# Patient Record
Sex: FEMALE | Race: WHITE | Employment: PART TIME | URBAN - METROPOLITAN AREA
[De-identification: names, ages, dates, MRNs, and addresses within clinical notes are randomized per-mention and may not be internally consistent; named-entity substitution may affect disease eponyms.]

---

## 2017-02-27 ENCOUNTER — APPOINTMENT (OUTPATIENT)
Dept: LAB | Age: 57
End: 2017-02-27
Attending: FAMILY MEDICINE
Payer: COMMERCIAL

## 2017-02-27 DIAGNOSIS — E78.5 OTHER AND UNSPECIFIED HYPERLIPIDEMIA: ICD-10-CM

## 2017-02-27 LAB
ALBUMIN SERPL-MCNC: 3.7 G/DL (ref 3.5–4.8)
ALP LIVER SERPL-CCNC: 89 U/L (ref 46–118)
ALT SERPL-CCNC: 31 U/L (ref 14–54)
AST SERPL-CCNC: 16 U/L (ref 15–41)
BILIRUB SERPL-MCNC: 0.2 MG/DL (ref 0.1–2)
BUN BLD-MCNC: 17 MG/DL (ref 8–20)
CALCIUM BLD-MCNC: 9.6 MG/DL (ref 8.3–10.3)
CHLORIDE: 106 MMOL/L (ref 101–111)
CHOLEST SMN-MCNC: 176 MG/DL (ref ?–200)
CO2: 30 MMOL/L (ref 22–32)
CREAT BLD-MCNC: 1.04 MG/DL (ref 0.55–1.02)
GLUCOSE BLD-MCNC: 83 MG/DL (ref 70–99)
HDLC SERPL-MCNC: 52 MG/DL (ref 45–?)
HDLC SERPL: 3.38 {RATIO} (ref ?–4.44)
LDLC SERPL CALC-MCNC: 80 MG/DL (ref ?–130)
M PROTEIN MFR SERPL ELPH: 7.5 G/DL (ref 6.1–8.3)
NONHDLC SERPL-MCNC: 124 MG/DL (ref ?–130)
POTASSIUM SERPL-SCNC: 4.2 MMOL/L (ref 3.6–5.1)
SODIUM SERPL-SCNC: 144 MMOL/L (ref 136–144)
TRIGLYCERIDES: 220 MG/DL (ref ?–150)
VLDL: 44 MG/DL (ref 5–40)

## 2017-02-27 PROCEDURE — 80061 LIPID PANEL: CPT

## 2017-02-27 PROCEDURE — 80053 COMPREHEN METABOLIC PANEL: CPT

## 2017-02-27 PROCEDURE — 36415 COLL VENOUS BLD VENIPUNCTURE: CPT

## 2017-03-02 RX ORDER — LEVOTHYROXINE SODIUM 0.1 MG/1
TABLET ORAL
Qty: 30 TABLET | Refills: 3 | Status: SHIPPED | OUTPATIENT
Start: 2017-03-02 | End: 2017-03-07

## 2017-03-07 ENCOUNTER — OFFICE VISIT (OUTPATIENT)
Dept: FAMILY MEDICINE CLINIC | Facility: CLINIC | Age: 57
End: 2017-03-07

## 2017-03-07 VITALS
RESPIRATION RATE: 16 BRPM | DIASTOLIC BLOOD PRESSURE: 64 MMHG | TEMPERATURE: 98 F | HEART RATE: 68 BPM | SYSTOLIC BLOOD PRESSURE: 112 MMHG | WEIGHT: 156 LBS | BODY MASS INDEX: 28 KG/M2

## 2017-03-07 DIAGNOSIS — K21.9 GASTROESOPHAGEAL REFLUX DISEASE, ESOPHAGITIS PRESENCE NOT SPECIFIED: ICD-10-CM

## 2017-03-07 DIAGNOSIS — F43.21 GRIEF REACTION: ICD-10-CM

## 2017-03-07 DIAGNOSIS — I10 BENIGN ESSENTIAL HYPERTENSION: Primary | ICD-10-CM

## 2017-03-07 DIAGNOSIS — E03.9 HYPOTHYROIDISM, UNSPECIFIED TYPE: ICD-10-CM

## 2017-03-07 DIAGNOSIS — E78.5 HYPERLIPIDEMIA, UNSPECIFIED HYPERLIPIDEMIA TYPE: ICD-10-CM

## 2017-03-07 PROCEDURE — 99214 OFFICE O/P EST MOD 30 MIN: CPT | Performed by: FAMILY MEDICINE

## 2017-03-07 RX ORDER — OMEPRAZOLE 40 MG/1
40 CAPSULE, DELAYED RELEASE ORAL
Qty: 30 CAPSULE | Refills: 5 | Status: SHIPPED | OUTPATIENT
Start: 2017-03-07 | End: 2018-01-12

## 2017-03-07 RX ORDER — FLUOXETINE HYDROCHLORIDE 20 MG/1
20 CAPSULE ORAL
Qty: 30 CAPSULE | Refills: 5 | Status: SHIPPED | OUTPATIENT
Start: 2017-03-07 | End: 2018-01-12

## 2017-03-07 RX ORDER — LOSARTAN POTASSIUM 50 MG/1
50 TABLET ORAL
Qty: 30 TABLET | Refills: 5 | Status: SHIPPED | OUTPATIENT
Start: 2017-03-07 | End: 2018-01-12

## 2017-03-07 RX ORDER — LEVOTHYROXINE SODIUM 0.1 MG/1
100 TABLET ORAL
Qty: 30 TABLET | Refills: 3 | Status: SHIPPED | OUTPATIENT
Start: 2017-03-07 | End: 2017-11-15

## 2017-03-07 RX ORDER — ATORVASTATIN CALCIUM 10 MG/1
10 TABLET, FILM COATED ORAL
Qty: 30 TABLET | Refills: 5 | Status: SHIPPED | OUTPATIENT
Start: 2017-03-07 | End: 2018-01-12

## 2017-03-07 NOTE — PROGRESS NOTES
CHIEF COMPLAINT:   Jefferson Sherwood a 61year old female is here for followup on HTN, cholesterol, grief reaction, hypothyroid, GERD  HPI:   Jefferson Shrewood has been doing well since the last visit here.   Jefferson Sherwood  is compliant with hypertensive m were placed in this encounter. Meds & Refills for this Visit:  Signed Prescriptions Disp Refills    Levothyroxine Sodium 100 MCG Oral Tab 30 tablet 3      Sig: Take 1 tablet (100 mcg total) by mouth once daily.       Omeprazole 40 MG Oral Capsule Nenita

## 2017-03-08 PROBLEM — E78.5 HYPERLIPIDEMIA: Status: ACTIVE | Noted: 2017-03-08

## 2017-05-02 DIAGNOSIS — G43.909 MIGRAINE WITHOUT STATUS MIGRAINOSUS, NOT INTRACTABLE, UNSPECIFIED MIGRAINE TYPE: Primary | ICD-10-CM

## 2017-05-02 RX ORDER — BUTALBITAL, ACETAMINOPHEN AND CAFFEINE 50; 325; 40 MG/1; MG/1; MG/1
TABLET ORAL
Qty: 60 TABLET | Refills: 0 | Status: SHIPPED
Start: 2017-05-02 | End: 2018-01-30

## 2017-05-02 NOTE — TELEPHONE ENCOUNTER
Medication: Fioricet    Date of last refill: 7/26/2017  [place]    Last office visit: 7/26/2016  Due back to clinic per last office note:  RTC in one year  Date next office visit scheduled:  None s

## 2017-07-17 DIAGNOSIS — Z12.39 BREAST CANCER SCREENING: Primary | ICD-10-CM

## 2017-07-17 NOTE — TELEPHONE ENCOUNTER
Medication failed protocol due to pt needing a mammogram.  Last OV 3/7/17 last refill 8/18/16.   Please review and refill if appropriate

## 2017-08-17 ENCOUNTER — TELEPHONE (OUTPATIENT)
Dept: FAMILY MEDICINE CLINIC | Facility: CLINIC | Age: 57
End: 2017-08-17

## 2017-08-17 RX ORDER — ESTROGEN,CON/M-PROGEST ACET 0.45-1.5MG
1 TABLET ORAL DAILY
Qty: 28 TABLET | Refills: 1 | Status: SHIPPED | OUTPATIENT
Start: 2017-08-17 | End: 2017-10-16

## 2017-08-17 NOTE — TELEPHONE ENCOUNTER
Please call patient to remind her to do her mammogram.  She is on HRT so needs to make sure she has done annually.

## 2017-08-17 NOTE — TELEPHONE ENCOUNTER
Has an appt on 10/07/2017 with you. Pended refill for # 28 with 1 refill to get her to her appt with you.

## 2017-08-23 DIAGNOSIS — G43.909 MIGRAINE WITHOUT STATUS MIGRAINOSUS, NOT INTRACTABLE, UNSPECIFIED MIGRAINE TYPE: ICD-10-CM

## 2017-08-23 NOTE — TELEPHONE ENCOUNTER
Patient willing to schedule an appointment, in order to process refill request. Call transferred to [facility]. Appointment scheduled.      Medication: Amitriptyline     Date of last refill: 07/26/16  [place]    Last office

## 2017-08-24 ENCOUNTER — OFFICE VISIT (OUTPATIENT)
Dept: NEUROLOGY | Facility: CLINIC | Age: 57
End: 2017-08-24

## 2017-08-24 VITALS
HEIGHT: 63 IN | WEIGHT: 156 LBS | RESPIRATION RATE: 16 BRPM | DIASTOLIC BLOOD PRESSURE: 79 MMHG | BODY MASS INDEX: 27.64 KG/M2 | SYSTOLIC BLOOD PRESSURE: 122 MMHG | HEART RATE: 82 BPM

## 2017-08-24 DIAGNOSIS — M54.81 OCCIPITAL NEURALGIA OF LEFT SIDE: ICD-10-CM

## 2017-08-24 DIAGNOSIS — G43.009 MIGRAINE WITHOUT AURA AND WITHOUT STATUS MIGRAINOSUS, NOT INTRACTABLE: Primary | ICD-10-CM

## 2017-08-24 PROCEDURE — 99214 OFFICE O/P EST MOD 30 MIN: CPT | Performed by: PHYSICIAN ASSISTANT

## 2017-08-24 RX ORDER — AMITRIPTYLINE HYDROCHLORIDE 25 MG/1
TABLET, FILM COATED ORAL
Qty: 90 TABLET | Refills: 3 | Status: SHIPPED | OUTPATIENT
Start: 2017-08-24 | End: 2018-07-24

## 2017-08-24 NOTE — PROGRESS NOTES
HPI:    Patient ID: Rona Fernandez is a 61year old female. HPI     Patient is a 61year old female here for follow-up of headaches. Her headache are currently well controlled typically occurring once a month still.  She is on amitriptyline without sherly capsule Rfl: 5   Losartan Potassium 50 MG Oral Tab Take 1 tablet (50 mg total) by mouth once daily. Disp: 30 tablet Rfl: 5   Atorvastatin Calcium 10 MG Oral Tab Take 1 tablet (10 mg total) by mouth once daily.  Disp: 30 tablet Rfl: 5   FLUoxetine HCl 20 MG the right side and 2+ on the left side. Achilles reflexes are 2+ on the right side and 2+ on the left side. No drift on exam. FTN intact bilaterally. Tandem gait intact. Tenderness of the left occipital area. Skin: Skin is warm and dry.    Psychiat

## 2017-08-24 NOTE — PATIENT INSTRUCTIONS
Refill policies:    • Allow 2-3 business days for refills; controlled substances may take longer.   • Contact your pharmacy at least 5 days prior to running out of medication and have them send an electronic request or submit request through the [facility] have a procedure or additional testing performed. [facility]) will contact your insurance carrier to obtain pre-certification or prior authorization.     Unfortunately, [facility] has seen an increase in denial of payment even though the p

## 2017-08-24 NOTE — PROGRESS NOTES
HPI:    Patient ID: Yousif Amaya is a 61year old female. HPI    Review of Systems         Current Outpatient Prescriptions:  PREMPRO 0.45-1.5 MG Oral Tab TAKE 1 TABLET BY MOUTH DAILY.  Disp: 28 tablet Rfl: 1   Butalbital-APAP-Caffeine -40 MG O

## 2017-08-26 ENCOUNTER — HOSPITAL ENCOUNTER (OUTPATIENT)
Dept: MAMMOGRAPHY | Facility: HOSPITAL | Age: 57
Discharge: HOME | End: 2017-08-26
Attending: FAMILY MEDICINE
Payer: COMMERCIAL

## 2017-08-26 DIAGNOSIS — Z12.39 BREAST CANCER SCREENING: ICD-10-CM

## 2017-08-26 PROCEDURE — 77067 SCR MAMMO BI INCL CAD: CPT | Performed by: FAMILY MEDICINE

## 2017-09-08 ENCOUNTER — HOSPITAL ENCOUNTER (OUTPATIENT)
Dept: MAMMOGRAPHY | Facility: HOSPITAL | Age: 57
Discharge: HOME | End: 2017-09-08
Attending: FAMILY MEDICINE
Payer: COMMERCIAL

## 2017-09-08 DIAGNOSIS — R92.8 ABNORMAL MAMMOGRAM OF RIGHT BREAST: ICD-10-CM

## 2017-09-08 PROCEDURE — 77061 BREAST TOMOSYNTHESIS UNI: CPT | Performed by: FAMILY MEDICINE

## 2017-09-08 PROCEDURE — 77065 DX MAMMO INCL CAD UNI: CPT | Performed by: FAMILY MEDICINE

## 2017-09-08 PROCEDURE — 76642 ULTRASOUND BREAST LIMITED: CPT | Performed by: FAMILY MEDICINE

## 2017-10-05 ENCOUNTER — OFFICE VISIT (OUTPATIENT)
Dept: FAMILY MEDICINE CLINIC | Facility: CLINIC | Age: 57
End: 2017-10-05

## 2017-10-05 VITALS
HEIGHT: 62 IN | WEIGHT: 158 LBS | HEART RATE: 64 BPM | TEMPERATURE: 97 F | DIASTOLIC BLOOD PRESSURE: 84 MMHG | BODY MASS INDEX: 29.08 KG/M2 | SYSTOLIC BLOOD PRESSURE: 134 MMHG | RESPIRATION RATE: 12 BRPM

## 2017-10-05 DIAGNOSIS — Z11.51 SCREENING FOR HPV (HUMAN PAPILLOMAVIRUS): ICD-10-CM

## 2017-10-05 DIAGNOSIS — Z13.89 SCREENING FOR GENITOURINARY CONDITION: ICD-10-CM

## 2017-10-05 DIAGNOSIS — Z00.00 ROUTINE GENERAL MEDICAL EXAMINATION AT A HEALTH CARE FACILITY: Primary | ICD-10-CM

## 2017-10-05 DIAGNOSIS — E04.2 MULTINODULAR GOITER: ICD-10-CM

## 2017-10-05 DIAGNOSIS — M85.80 OSTEOPENIA, UNSPECIFIED LOCATION: ICD-10-CM

## 2017-10-05 DIAGNOSIS — D47.2 MONOCLONAL GAMMOPATHY: ICD-10-CM

## 2017-10-05 DIAGNOSIS — R20.2 PARESTHESIAS: ICD-10-CM

## 2017-10-05 DIAGNOSIS — Z12.4 PAP SMEAR FOR CERVICAL CANCER SCREENING: ICD-10-CM

## 2017-10-05 PROCEDURE — 99396 PREV VISIT EST AGE 40-64: CPT | Performed by: FAMILY MEDICINE

## 2017-10-05 PROCEDURE — 87624 HPV HI-RISK TYP POOLED RSLT: CPT | Performed by: FAMILY MEDICINE

## 2017-10-05 PROCEDURE — 90686 IIV4 VACC NO PRSV 0.5 ML IM: CPT | Performed by: FAMILY MEDICINE

## 2017-10-05 PROCEDURE — 88175 CYTOPATH C/V AUTO FLUID REDO: CPT | Performed by: FAMILY MEDICINE

## 2017-10-05 PROCEDURE — 90471 IMMUNIZATION ADMIN: CPT | Performed by: FAMILY MEDICINE

## 2017-10-05 NOTE — PROGRESS NOTES
CHIEF COMPLAINT       Annual Physical Exam  HPI:   Inocente Rodriguez is a 61year old female who presents for a complete physical exam.   Fingertips tingling on both hands - goes numb at times. No pain. Seems to be all fingers on both hands.   Hasn't been Capsule Delayed Release Take 1 capsule (40 mg total) by mouth once daily. Disp: 30 capsule Rfl: 5   Losartan Potassium 50 MG Oral Tab Take 1 tablet (50 mg total) by mouth once daily.  Disp: 30 tablet Rfl: 5   Atorvastatin Calcium 10 MG Oral Tab Take 1 table denies nasal congestion, sinus pain or ST  LUNGS: denies shortness of breath with exertion  CARDIOVASCULAR: denies chest pain on exertion  GI: denies abdominal pain,denies heartburn  : denies dysuria, vaginal discharge or itching  MUSCULOSKELETAL: denies

## 2017-10-11 ENCOUNTER — HOSPITAL ENCOUNTER (OUTPATIENT)
Dept: ULTRASOUND IMAGING | Age: 57
Discharge: HOME | End: 2017-10-11
Attending: FAMILY MEDICINE
Payer: COMMERCIAL

## 2017-10-11 ENCOUNTER — LABORATORY ENCOUNTER (OUTPATIENT)
Dept: LAB | Age: 57
End: 2017-10-11
Attending: FAMILY MEDICINE
Payer: COMMERCIAL

## 2017-10-11 ENCOUNTER — APPOINTMENT (OUTPATIENT)
Dept: HEMATOLOGY/ONCOLOGY | Facility: HOSPITAL | Age: 57
End: 2017-10-11
Attending: INTERNAL MEDICINE
Payer: COMMERCIAL

## 2017-10-11 ENCOUNTER — HOSPITAL ENCOUNTER (OUTPATIENT)
Dept: BONE DENSITY | Age: 57
Discharge: HOME | End: 2017-10-11
Attending: FAMILY MEDICINE
Payer: COMMERCIAL

## 2017-10-11 DIAGNOSIS — M85.80 OSTEOPENIA, UNSPECIFIED LOCATION: ICD-10-CM

## 2017-10-11 DIAGNOSIS — E04.2 MULTINODULAR GOITER: ICD-10-CM

## 2017-10-11 DIAGNOSIS — Z00.00 ROUTINE GENERAL MEDICAL EXAMINATION AT A HEALTH CARE FACILITY: ICD-10-CM

## 2017-10-11 DIAGNOSIS — R20.2 PARESTHESIAS: ICD-10-CM

## 2017-10-11 DIAGNOSIS — R73.9 ELEVATED BLOOD SUGAR: Primary | ICD-10-CM

## 2017-10-11 DIAGNOSIS — R73.9 ELEVATED BLOOD SUGAR: ICD-10-CM

## 2017-10-11 DIAGNOSIS — Z13.89 SCREENING FOR GENITOURINARY CONDITION: ICD-10-CM

## 2017-10-11 PROCEDURE — 77080 DXA BONE DENSITY AXIAL: CPT | Performed by: FAMILY MEDICINE

## 2017-10-11 PROCEDURE — 83036 HEMOGLOBIN GLYCOSYLATED A1C: CPT | Performed by: FAMILY MEDICINE

## 2017-10-11 PROCEDURE — 80061 LIPID PANEL: CPT | Performed by: FAMILY MEDICINE

## 2017-10-11 PROCEDURE — 76536 US EXAM OF HEAD AND NECK: CPT | Performed by: FAMILY MEDICINE

## 2017-10-11 PROCEDURE — 84439 ASSAY OF FREE THYROXINE: CPT | Performed by: FAMILY MEDICINE

## 2017-10-11 PROCEDURE — 82607 VITAMIN B-12: CPT | Performed by: FAMILY MEDICINE

## 2017-10-11 PROCEDURE — 81003 URINALYSIS AUTO W/O SCOPE: CPT | Performed by: FAMILY MEDICINE

## 2017-10-11 PROCEDURE — 82306 VITAMIN D 25 HYDROXY: CPT | Performed by: FAMILY MEDICINE

## 2017-10-11 PROCEDURE — 36415 COLL VENOUS BLD VENIPUNCTURE: CPT | Performed by: FAMILY MEDICINE

## 2017-10-11 PROCEDURE — 80050 GENERAL HEALTH PANEL: CPT | Performed by: FAMILY MEDICINE

## 2017-10-12 DIAGNOSIS — E78.5 HYPERLIPIDEMIA, UNSPECIFIED HYPERLIPIDEMIA TYPE: ICD-10-CM

## 2017-10-12 DIAGNOSIS — R73.02 GLUCOSE INTOLERANCE (IMPAIRED GLUCOSE TOLERANCE): Primary | ICD-10-CM

## 2017-10-16 RX ORDER — ESTROGEN,CON/M-PROGEST ACET 0.45-1.5MG
1 TABLET ORAL DAILY
Qty: 28 TABLET | Refills: 5 | Status: SHIPPED | OUTPATIENT
Start: 2017-10-16 | End: 2018-03-20

## 2017-10-18 ENCOUNTER — APPOINTMENT (OUTPATIENT)
Dept: HEMATOLOGY/ONCOLOGY | Facility: HOSPITAL | Age: 57
End: 2017-10-18
Payer: COMMERCIAL

## 2017-10-25 ENCOUNTER — TELEPHONE (OUTPATIENT)
Dept: FAMILY MEDICINE CLINIC | Facility: CLINIC | Age: 57
End: 2017-10-25

## 2017-10-25 NOTE — TELEPHONE ENCOUNTER
See note below, per your note 10/11/17 VitB complex was recommended. Pt wants to know if it has a mg dose, will any Vit B complex do? Please advise.

## 2017-10-25 NOTE — TELEPHONE ENCOUNTER
States Prabhu Rodriguez recommended that she take Vitamin B. She is not sure if its Vitamin B complex  or B12 and how much? Please advise at [phone]. Thank you.

## 2017-11-15 RX ORDER — LEVOTHYROXINE SODIUM 0.1 MG/1
100 TABLET ORAL
Qty: 30 TABLET | Refills: 2 | Status: SHIPPED | OUTPATIENT
Start: 2017-11-15 | End: 2018-01-28

## 2018-01-11 ENCOUNTER — APPOINTMENT (OUTPATIENT)
Dept: LAB | Age: 58
End: 2018-01-11
Attending: FAMILY MEDICINE
Payer: COMMERCIAL

## 2018-01-11 DIAGNOSIS — R73.02 GLUCOSE INTOLERANCE (IMPAIRED GLUCOSE TOLERANCE): ICD-10-CM

## 2018-01-11 DIAGNOSIS — E78.5 HYPERLIPIDEMIA, UNSPECIFIED HYPERLIPIDEMIA TYPE: ICD-10-CM

## 2018-01-11 LAB
ALBUMIN SERPL-MCNC: 3.7 G/DL (ref 3.5–4.8)
ALP LIVER SERPL-CCNC: 68 U/L (ref 46–118)
ALT SERPL-CCNC: 23 U/L (ref 14–54)
AST SERPL-CCNC: 19 U/L (ref 15–41)
BILIRUB SERPL-MCNC: 0.3 MG/DL (ref 0.1–2)
BUN BLD-MCNC: 23 MG/DL (ref 8–20)
CALCIUM BLD-MCNC: 9.9 MG/DL (ref 8.3–10.3)
CHLORIDE: 106 MMOL/L (ref 101–111)
CHOLEST SMN-MCNC: 190 MG/DL (ref ?–200)
CO2: 29 MMOL/L (ref 22–32)
CREAT BLD-MCNC: 1.02 MG/DL (ref 0.55–1.02)
EST. AVERAGE GLUCOSE BLD GHB EST-MCNC: 128 MG/DL (ref 68–126)
GLUCOSE BLD-MCNC: 108 MG/DL (ref 70–99)
HBA1C MFR BLD HPLC: 6.1 % (ref ?–5.7)
HDLC SERPL-MCNC: 42 MG/DL (ref 45–?)
HDLC SERPL: 4.52 {RATIO} (ref ?–4.44)
LDLC SERPL CALC-MCNC: 82 MG/DL (ref ?–130)
M PROTEIN MFR SERPL ELPH: 7.7 G/DL (ref 6.1–8.3)
NONHDLC SERPL-MCNC: 148 MG/DL (ref ?–130)
POTASSIUM SERPL-SCNC: 4.1 MMOL/L (ref 3.6–5.1)
SODIUM SERPL-SCNC: 140 MMOL/L (ref 136–144)
TRIGL SERPL-MCNC: 330 MG/DL (ref ?–150)
VLDLC SERPL CALC-MCNC: 66 MG/DL (ref 5–40)

## 2018-01-11 PROCEDURE — 83036 HEMOGLOBIN GLYCOSYLATED A1C: CPT | Performed by: FAMILY MEDICINE

## 2018-01-11 PROCEDURE — 36415 COLL VENOUS BLD VENIPUNCTURE: CPT | Performed by: FAMILY MEDICINE

## 2018-01-11 PROCEDURE — 80053 COMPREHEN METABOLIC PANEL: CPT | Performed by: FAMILY MEDICINE

## 2018-01-11 PROCEDURE — 80061 LIPID PANEL: CPT | Performed by: FAMILY MEDICINE

## 2018-01-15 RX ORDER — LOSARTAN POTASSIUM 50 MG/1
50 TABLET ORAL
Qty: 30 TABLET | Refills: 4 | Status: SHIPPED | OUTPATIENT
Start: 2018-01-15 | End: 2018-03-20

## 2018-01-15 RX ORDER — FLUOXETINE HYDROCHLORIDE 20 MG/1
20 CAPSULE ORAL
Qty: 30 CAPSULE | Refills: 3 | Status: SHIPPED | OUTPATIENT
Start: 2018-01-15 | End: 2018-03-20

## 2018-01-15 RX ORDER — ATORVASTATIN CALCIUM 10 MG/1
10 TABLET, FILM COATED ORAL
Qty: 30 TABLET | Refills: 3 | Status: SHIPPED | OUTPATIENT
Start: 2018-01-15 | End: 2018-04-25

## 2018-01-15 RX ORDER — OMEPRAZOLE 40 MG/1
40 CAPSULE, DELAYED RELEASE ORAL
Qty: 30 CAPSULE | Refills: 3 | Status: SHIPPED | OUTPATIENT
Start: 2018-01-15 | End: 2018-03-20

## 2018-01-15 NOTE — TELEPHONE ENCOUNTER
Not protocol medication. LOV :10/15/17 physical   Last labs done :1/11/18  Next appointment : not yet made. Please see pending medication. Refill if appropriate.    Last refill:    Date:3/7/17  Amount :30 capsules 5 refills  Medication: Omeprazole 40 mg

## 2018-01-29 RX ORDER — LEVOTHYROXINE SODIUM 0.1 MG/1
100 TABLET ORAL
Qty: 30 TABLET | Refills: 2 | Status: SHIPPED | OUTPATIENT
Start: 2018-01-29 | End: 2018-04-27

## 2018-01-30 DIAGNOSIS — G43.909 MIGRAINE WITHOUT STATUS MIGRAINOSUS, NOT INTRACTABLE, UNSPECIFIED MIGRAINE TYPE: ICD-10-CM

## 2018-01-30 RX ORDER — BUTALBITAL, ACETAMINOPHEN AND CAFFEINE 50; 325; 40 MG/1; MG/1; MG/1
TABLET ORAL
Qty: 60 TABLET | Refills: 0 | Status: SHIPPED
Start: 2018-01-30 | End: 2019-02-11

## 2018-01-30 NOTE — TELEPHONE ENCOUNTER
Medication: Butalbital -40 mg    Date of last refill: 05/02/17  [place]     Last office visit: 8/24/2017  Due back to clinic per last office note:  RTN in 1 year by 08/24/18  Date next office visit scheduled:  No f

## 2018-03-19 ENCOUNTER — TELEPHONE (OUTPATIENT)
Dept: FAMILY MEDICINE CLINIC | Facility: CLINIC | Age: 58
End: 2018-03-19

## 2018-03-19 NOTE — TELEPHONE ENCOUNTER
s/w pt. Reports intermittent tingling in her fingers but states \"alondra already knew about that\"  Reports having dizziness yesterday while washing the dishes. No syncope. Reports being off her prempro past 2 weeks and wonders if possibly related.

## 2018-03-20 ENCOUNTER — OFFICE VISIT (OUTPATIENT)
Dept: FAMILY MEDICINE CLINIC | Facility: CLINIC | Age: 58
End: 2018-03-20

## 2018-03-20 ENCOUNTER — LAB ENCOUNTER (OUTPATIENT)
Dept: LAB | Age: 58
End: 2018-03-20
Attending: FAMILY MEDICINE
Payer: COMMERCIAL

## 2018-03-20 ENCOUNTER — TELEPHONE (OUTPATIENT)
Dept: FAMILY MEDICINE CLINIC | Facility: CLINIC | Age: 58
End: 2018-03-20

## 2018-03-20 VITALS
WEIGHT: 160 LBS | DIASTOLIC BLOOD PRESSURE: 80 MMHG | HEART RATE: 98 BPM | SYSTOLIC BLOOD PRESSURE: 140 MMHG | RESPIRATION RATE: 20 BRPM | HEIGHT: 62 IN | BODY MASS INDEX: 29.44 KG/M2 | TEMPERATURE: 98 F

## 2018-03-20 DIAGNOSIS — R23.2 HOT FLASHES: ICD-10-CM

## 2018-03-20 DIAGNOSIS — E03.9 HYPOTHYROIDISM, UNSPECIFIED TYPE: ICD-10-CM

## 2018-03-20 DIAGNOSIS — E78.5 HYPERLIPIDEMIA, UNSPECIFIED HYPERLIPIDEMIA TYPE: ICD-10-CM

## 2018-03-20 DIAGNOSIS — E74.39 GLUCOSE INTOLERANCE: ICD-10-CM

## 2018-03-20 DIAGNOSIS — I10 BENIGN ESSENTIAL HYPERTENSION: Primary | ICD-10-CM

## 2018-03-20 DIAGNOSIS — R42 LIGHTHEADED: ICD-10-CM

## 2018-03-20 LAB
ALBUMIN SERPL-MCNC: 3.6 G/DL (ref 3.5–4.8)
ALP LIVER SERPL-CCNC: 97 U/L (ref 46–118)
ALT SERPL-CCNC: 26 U/L (ref 14–54)
AST SERPL-CCNC: 23 U/L (ref 15–41)
BASOPHILS # BLD AUTO: 0.03 X10(3) UL (ref 0–0.1)
BASOPHILS NFR BLD AUTO: 0.4 %
BILIRUB SERPL-MCNC: 0.2 MG/DL (ref 0.1–2)
BUN BLD-MCNC: 17 MG/DL (ref 8–20)
CALCIUM BLD-MCNC: 9.1 MG/DL (ref 8.3–10.3)
CHLORIDE: 107 MMOL/L (ref 101–111)
CHOLEST SMN-MCNC: 194 MG/DL (ref ?–200)
CO2: 26 MMOL/L (ref 22–32)
CREAT BLD-MCNC: 0.91 MG/DL (ref 0.55–1.02)
EOSINOPHIL # BLD AUTO: 0.1 X10(3) UL (ref 0–0.3)
EOSINOPHIL NFR BLD AUTO: 1.3 %
ERYTHROCYTE [DISTWIDTH] IN BLOOD BY AUTOMATED COUNT: 12.3 % (ref 11.5–16)
EST. AVERAGE GLUCOSE BLD GHB EST-MCNC: 126 MG/DL (ref 68–126)
FREE T4: 0.9 NG/DL (ref 0.9–1.8)
GLUCOSE BLD-MCNC: 95 MG/DL (ref 70–99)
HBA1C MFR BLD HPLC: 6 % (ref ?–5.7)
HCT VFR BLD AUTO: 38.1 % (ref 34–50)
HDLC SERPL-MCNC: 46 MG/DL (ref 45–?)
HDLC SERPL: 4.22 {RATIO} (ref ?–4.44)
HGB BLD-MCNC: 12.6 G/DL (ref 12–16)
IMMATURE GRANULOCYTE COUNT: 0.03 X10(3) UL (ref 0–1)
IMMATURE GRANULOCYTE RATIO %: 0.4 %
LDLC SERPL CALC-MCNC: 78 MG/DL (ref ?–130)
LYMPHOCYTES # BLD AUTO: 2.41 X10(3) UL (ref 0.9–4)
LYMPHOCYTES NFR BLD AUTO: 31.2 %
M PROTEIN MFR SERPL ELPH: 7.7 G/DL (ref 6.1–8.3)
MCH RBC QN AUTO: 31 PG (ref 27–33.2)
MCHC RBC AUTO-ENTMCNC: 33.1 G/DL (ref 31–37)
MCV RBC AUTO: 93.8 FL (ref 81–100)
MONOCYTES # BLD AUTO: 0.44 X10(3) UL (ref 0.1–1)
MONOCYTES NFR BLD AUTO: 5.7 %
NEUTROPHIL ABS PRELIM: 4.71 X10 (3) UL (ref 1.3–6.7)
NEUTROPHILS # BLD AUTO: 4.71 X10(3) UL (ref 1.3–6.7)
NEUTROPHILS NFR BLD AUTO: 61 %
NONHDLC SERPL-MCNC: 148 MG/DL (ref ?–130)
PLATELET # BLD AUTO: 257 10(3)UL (ref 150–450)
POTASSIUM SERPL-SCNC: 4 MMOL/L (ref 3.6–5.1)
RBC # BLD AUTO: 4.06 X10(6)UL (ref 3.8–5.1)
RED CELL DISTRIBUTION WIDTH-SD: 42.5 FL (ref 35.1–46.3)
SODIUM SERPL-SCNC: 140 MMOL/L (ref 136–144)
TRIGL SERPL-MCNC: 349 MG/DL (ref ?–150)
TSI SER-ACNC: 2.51 MIU/ML (ref 0.35–5.5)
VLDLC SERPL CALC-MCNC: 70 MG/DL (ref 5–40)
WBC # BLD AUTO: 7.7 X10(3) UL (ref 4–13)

## 2018-03-20 PROCEDURE — 80061 LIPID PANEL: CPT | Performed by: FAMILY MEDICINE

## 2018-03-20 PROCEDURE — 83036 HEMOGLOBIN GLYCOSYLATED A1C: CPT | Performed by: FAMILY MEDICINE

## 2018-03-20 PROCEDURE — 36415 COLL VENOUS BLD VENIPUNCTURE: CPT | Performed by: FAMILY MEDICINE

## 2018-03-20 PROCEDURE — 99214 OFFICE O/P EST MOD 30 MIN: CPT | Performed by: FAMILY MEDICINE

## 2018-03-20 PROCEDURE — 84439 ASSAY OF FREE THYROXINE: CPT | Performed by: FAMILY MEDICINE

## 2018-03-20 PROCEDURE — 80050 GENERAL HEALTH PANEL: CPT | Performed by: FAMILY MEDICINE

## 2018-03-20 RX ORDER — OMEPRAZOLE 40 MG/1
40 CAPSULE, DELAYED RELEASE ORAL
Qty: 90 CAPSULE | Refills: 1 | Status: SHIPPED | OUTPATIENT
Start: 2018-03-20 | End: 2018-11-26

## 2018-03-20 RX ORDER — FLUOXETINE HYDROCHLORIDE 20 MG/1
20 CAPSULE ORAL
Qty: 90 CAPSULE | Refills: 1 | Status: SHIPPED | OUTPATIENT
Start: 2018-03-20 | End: 2018-11-26

## 2018-03-20 RX ORDER — LOSARTAN POTASSIUM AND HYDROCHLOROTHIAZIDE 12.5; 5 MG/1; MG/1
1 TABLET ORAL DAILY
Qty: 90 TABLET | Refills: 0 | Status: SHIPPED | OUTPATIENT
Start: 2018-03-20 | End: 2018-05-01

## 2018-03-20 NOTE — TELEPHONE ENCOUNTER
Pt had appt this morn. Has question on what time of day to take Losartan Potassium-HCTZ 50-12.5 MG Oral Tab.

## 2018-03-20 NOTE — PROGRESS NOTES
Leelee Goodson is a 60year old female. CHIEF COMPLAINT   Hot flashes  HPI:   Stopped hormones about 2.5 weeks ago.   Since then has felt hot flashes (more at night with sleeping), tingling in all fingers, was a little lightheaded over the weekend (felt Types: Cigarettes     Quit date: 8/27/2000  Smokeless tobacco: Never Used                      Alcohol use:  No                 REVIEW OF SYSTEMS:   GENERAL HEALTH: as above  SKIN: denies any unusual skin lesions or rashes  RESPIRATORY: denies shortness of stay off the medication and wait for the symptoms to resolve. Lightheadedness with standing was probably due to prolonged standing at the sink with her knees locked. She will call if she has any further symptoms of lightheadedness.   The patient indicates

## 2018-03-20 NOTE — TELEPHONE ENCOUNTER
Call to pt-advised to take losartan-HCTZ in the morning. sts she has been taking at night, so will delay today's dose and change dosing to morning tomorrow.

## 2018-03-21 DIAGNOSIS — E78.2 ELEVATED CHOLESTEROL WITH HIGH TRIGLYCERIDES: Primary | ICD-10-CM

## 2018-03-21 RX ORDER — OMEGA-3-ACID ETHYL ESTERS 1 G/1
4 CAPSULE, LIQUID FILLED ORAL DAILY
Qty: 120 CAPSULE | Refills: 2 | Status: SHIPPED | OUTPATIENT
Start: 2018-03-21 | End: 2018-06-12

## 2018-04-26 RX ORDER — ATORVASTATIN CALCIUM 10 MG/1
10 TABLET, FILM COATED ORAL
Qty: 30 TABLET | Refills: 0 | Status: SHIPPED | OUTPATIENT
Start: 2018-04-26 | End: 2018-05-01

## 2018-04-27 RX ORDER — LEVOTHYROXINE SODIUM 0.1 MG/1
100 TABLET ORAL
Qty: 30 TABLET | Refills: 4 | Status: SHIPPED | OUTPATIENT
Start: 2018-04-27 | End: 2018-05-01

## 2018-05-01 ENCOUNTER — OFFICE VISIT (OUTPATIENT)
Dept: FAMILY MEDICINE CLINIC | Facility: CLINIC | Age: 58
End: 2018-05-01

## 2018-05-01 VITALS
WEIGHT: 161 LBS | TEMPERATURE: 97 F | RESPIRATION RATE: 12 BRPM | SYSTOLIC BLOOD PRESSURE: 106 MMHG | HEIGHT: 62 IN | DIASTOLIC BLOOD PRESSURE: 76 MMHG | HEART RATE: 80 BPM | BODY MASS INDEX: 29.63 KG/M2

## 2018-05-01 DIAGNOSIS — I10 BENIGN ESSENTIAL HYPERTENSION: Primary | ICD-10-CM

## 2018-05-01 DIAGNOSIS — E78.5 HYPERLIPIDEMIA, UNSPECIFIED HYPERLIPIDEMIA TYPE: ICD-10-CM

## 2018-05-01 DIAGNOSIS — E03.9 HYPOTHYROIDISM, UNSPECIFIED TYPE: ICD-10-CM

## 2018-05-01 PROCEDURE — 99213 OFFICE O/P EST LOW 20 MIN: CPT | Performed by: FAMILY MEDICINE

## 2018-05-01 RX ORDER — LOSARTAN POTASSIUM AND HYDROCHLOROTHIAZIDE 12.5; 5 MG/1; MG/1
1 TABLET ORAL DAILY
Qty: 90 TABLET | Refills: 1 | Status: SHIPPED | OUTPATIENT
Start: 2018-05-01 | End: 2018-06-15

## 2018-05-01 RX ORDER — ATORVASTATIN CALCIUM 10 MG/1
10 TABLET, FILM COATED ORAL
Qty: 90 TABLET | Refills: 1 | Status: SHIPPED | OUTPATIENT
Start: 2018-05-01 | End: 2018-11-18

## 2018-05-01 RX ORDER — LEVOTHYROXINE SODIUM 0.1 MG/1
100 TABLET ORAL
Qty: 90 TABLET | Refills: 1 | Status: SHIPPED | OUTPATIENT
Start: 2018-05-01 | End: 2019-03-13

## 2018-05-01 NOTE — PROGRESS NOTES
CHIEF COMPLAINT:   Aleyda Quinn a 60year old female is here for followup on HTN  HPI:   Aleyda Quinn has been doing well since the last visit here. Aleyda Quinn  is compliant with hypertensive medication. No chest pain. No dyspnea.  No palpit

## 2018-05-26 RX ORDER — ATORVASTATIN CALCIUM 10 MG/1
10 TABLET, FILM COATED ORAL
Qty: 30 TABLET | Refills: 0 | OUTPATIENT
Start: 2018-05-26

## 2018-06-13 RX ORDER — OMEGA-3-ACID ETHYL ESTERS 1 G/1
4 CAPSULE, LIQUID FILLED ORAL DAILY
Qty: 120 CAPSULE | Refills: 2 | Status: SHIPPED | OUTPATIENT
Start: 2018-06-13 | End: 2018-09-05

## 2018-06-15 RX ORDER — LOSARTAN POTASSIUM AND HYDROCHLOROTHIAZIDE 12.5; 5 MG/1; MG/1
1 TABLET ORAL DAILY
Qty: 90 TABLET | Refills: 1 | Status: SHIPPED | OUTPATIENT
Start: 2018-06-15 | End: 2019-01-08 | Stop reason: DRUGHIGH

## 2018-07-24 DIAGNOSIS — G43.909 MIGRAINE WITHOUT STATUS MIGRAINOSUS, NOT INTRACTABLE, UNSPECIFIED MIGRAINE TYPE: ICD-10-CM

## 2018-07-24 RX ORDER — AMITRIPTYLINE HYDROCHLORIDE 25 MG/1
TABLET, FILM COATED ORAL
Qty: 90 TABLET | Refills: 0 | Status: SHIPPED | OUTPATIENT
Start: 2018-07-24 | End: 2018-08-30 | Stop reason: DRUGHIGH

## 2018-07-24 NOTE — TELEPHONE ENCOUNTER
Patient returned nurses call to clarify dosage. Is taking the 25 mg, headaches have been under control. Patient scheduled follow up appointment. Will renew as 25 mg and can adjust after follow up.

## 2018-07-24 NOTE — TELEPHONE ENCOUNTER
Medication: Amitriptyline    Date of last refill: 8/24/2017  [place] medication    Last office visit: 8/24/2017  Due back to clinic per last office note:  RTC yearly, due back Aug 2018    Date next office vis

## 2018-07-24 NOTE — TELEPHONE ENCOUNTER
Message left for patient to contact office for dosage clarification and schedule follow up appointment.

## 2018-08-10 ENCOUNTER — HOSPITAL ENCOUNTER (EMERGENCY)
Facility: HOSPITAL | Age: 58
Discharge: HOME | End: 2018-08-10
Attending: EMERGENCY MEDICINE
Payer: COMMERCIAL

## 2018-08-10 ENCOUNTER — APPOINTMENT (OUTPATIENT)
Dept: CT IMAGING | Facility: HOSPITAL | Age: 58
End: 2018-08-10
Attending: EMERGENCY MEDICINE
Payer: COMMERCIAL

## 2018-08-10 VITALS
OXYGEN SATURATION: 96 % | RESPIRATION RATE: 18 BRPM | DIASTOLIC BLOOD PRESSURE: 72 MMHG | SYSTOLIC BLOOD PRESSURE: 129 MMHG | HEART RATE: 69 BPM | TEMPERATURE: 98 F

## 2018-08-10 DIAGNOSIS — N20.0 KIDNEY STONE: Primary | ICD-10-CM

## 2018-08-10 LAB
ALBUMIN SERPL-MCNC: 3.6 G/DL (ref 3.5–4.8)
ALBUMIN/GLOB SERPL: 0.8 {RATIO} (ref 1–2)
ALP LIVER SERPL-CCNC: 104 U/L (ref 46–118)
ALT SERPL-CCNC: 46 U/L (ref 14–54)
ANION GAP SERPL CALC-SCNC: 7 MMOL/L (ref 0–18)
AST SERPL-CCNC: 38 U/L (ref 15–41)
BASOPHILS # BLD AUTO: 0.05 X10(3) UL (ref 0–0.1)
BASOPHILS NFR BLD AUTO: 0.6 %
BILIRUB SERPL-MCNC: 0.3 MG/DL (ref 0.1–2)
BILIRUB UR QL STRIP.AUTO: NEGATIVE
BUN BLD-MCNC: 18 MG/DL (ref 8–20)
BUN/CREAT SERPL: 15.5 (ref 10–20)
CALCIUM BLD-MCNC: 10 MG/DL (ref 8.3–10.3)
CHLORIDE SERPL-SCNC: 104 MMOL/L (ref 101–111)
CO2 SERPL-SCNC: 29 MMOL/L (ref 22–32)
CREAT BLD-MCNC: 1.16 MG/DL (ref 0.55–1.02)
EOSINOPHIL # BLD AUTO: 0.1 X10(3) UL (ref 0–0.3)
EOSINOPHIL NFR BLD AUTO: 1.1 %
ERYTHROCYTE [DISTWIDTH] IN BLOOD BY AUTOMATED COUNT: 12.4 % (ref 11.5–16)
GLOBULIN PLAS-MCNC: 4.4 G/DL (ref 2.5–3.7)
GLUCOSE BLD-MCNC: 125 MG/DL (ref 70–99)
GLUCOSE UR STRIP.AUTO-MCNC: NEGATIVE MG/DL
HCT VFR BLD AUTO: 38.7 % (ref 34–50)
HGB BLD-MCNC: 13.3 G/DL (ref 12–16)
IMMATURE GRANULOCYTE COUNT: 0.03 X10(3) UL (ref 0–1)
IMMATURE GRANULOCYTE RATIO %: 0.3 %
KETONES UR STRIP.AUTO-MCNC: NEGATIVE MG/DL
LEUKOCYTE ESTERASE UR QL STRIP.AUTO: NEGATIVE
LYMPHOCYTES # BLD AUTO: 3.12 X10(3) UL (ref 0.9–4)
LYMPHOCYTES NFR BLD AUTO: 35.1 %
M PROTEIN MFR SERPL ELPH: 8 G/DL (ref 6.1–8.3)
MCH RBC QN AUTO: 31.4 PG (ref 27–33.2)
MCHC RBC AUTO-ENTMCNC: 34.4 G/DL (ref 31–37)
MCV RBC AUTO: 91.5 FL (ref 81–100)
MONOCYTES # BLD AUTO: 0.63 X10(3) UL (ref 0.1–1)
MONOCYTES NFR BLD AUTO: 7.1 %
NEUTROPHIL ABS PRELIM: 4.97 X10 (3) UL (ref 1.3–6.7)
NEUTROPHILS # BLD AUTO: 4.97 X10(3) UL (ref 1.3–6.7)
NEUTROPHILS NFR BLD AUTO: 55.8 %
NITRITE UR QL STRIP.AUTO: NEGATIVE
OSMOLALITY SERPL CALC.SUM OF ELEC: 293 MOSM/KG (ref 275–295)
PH UR STRIP.AUTO: 7 [PH] (ref 4.5–8)
PLATELET # BLD AUTO: 252 10(3)UL (ref 150–450)
POTASSIUM SERPL-SCNC: 4.1 MMOL/L (ref 3.6–5.1)
PROT UR STRIP.AUTO-MCNC: 100 MG/DL
RBC # BLD AUTO: 4.23 X10(6)UL (ref 3.8–5.1)
RBC #/AREA URNS AUTO: >10 /HPF
RED CELL DISTRIBUTION WIDTH-SD: 41 FL (ref 35.1–46.3)
SODIUM SERPL-SCNC: 140 MMOL/L (ref 136–144)
SP GR UR STRIP.AUTO: 1.02 (ref 1–1.03)
UROBILINOGEN UR STRIP.AUTO-MCNC: <2 MG/DL
WBC # BLD AUTO: 8.9 X10(3) UL (ref 4–13)

## 2018-08-10 PROCEDURE — 99285 EMERGENCY DEPT VISIT HI MDM: CPT

## 2018-08-10 PROCEDURE — 80053 COMPREHEN METABOLIC PANEL: CPT | Performed by: EMERGENCY MEDICINE

## 2018-08-10 PROCEDURE — 96374 THER/PROPH/DIAG INJ IV PUSH: CPT

## 2018-08-10 PROCEDURE — 85025 COMPLETE CBC W/AUTO DIFF WBC: CPT | Performed by: EMERGENCY MEDICINE

## 2018-08-10 PROCEDURE — 74176 CT ABD & PELVIS W/O CONTRAST: CPT | Performed by: EMERGENCY MEDICINE

## 2018-08-10 PROCEDURE — 81001 URINALYSIS AUTO W/SCOPE: CPT | Performed by: EMERGENCY MEDICINE

## 2018-08-10 PROCEDURE — 87086 URINE CULTURE/COLONY COUNT: CPT | Performed by: EMERGENCY MEDICINE

## 2018-08-10 PROCEDURE — 96375 TX/PRO/DX INJ NEW DRUG ADDON: CPT

## 2018-08-10 PROCEDURE — 99284 EMERGENCY DEPT VISIT MOD MDM: CPT

## 2018-08-10 PROCEDURE — 96376 TX/PRO/DX INJ SAME DRUG ADON: CPT

## 2018-08-10 RX ORDER — MORPHINE SULFATE 4 MG/ML
4 INJECTION, SOLUTION INTRAMUSCULAR; INTRAVENOUS EVERY 30 MIN PRN
Status: DISCONTINUED | OUTPATIENT
Start: 2018-08-10 | End: 2018-08-10

## 2018-08-10 RX ORDER — KETOROLAC TROMETHAMINE 30 MG/ML
30 INJECTION, SOLUTION INTRAMUSCULAR; INTRAVENOUS ONCE
Status: COMPLETED | OUTPATIENT
Start: 2018-08-10 | End: 2018-08-10

## 2018-08-10 RX ORDER — ONDANSETRON 2 MG/ML
4 INJECTION INTRAMUSCULAR; INTRAVENOUS ONCE
Status: COMPLETED | OUTPATIENT
Start: 2018-08-10 | End: 2018-08-10

## 2018-08-10 RX ORDER — HYDROCODONE BITARTRATE AND ACETAMINOPHEN 5; 325 MG/1; MG/1
1-2 TABLET ORAL EVERY 4 HOURS PRN
Qty: 10 TABLET | Refills: 0 | Status: SHIPPED | OUTPATIENT
Start: 2018-08-10 | End: 2018-08-17

## 2018-08-10 NOTE — ED PROVIDER NOTES
Patient Seen in: [facility] Emergency Department    History   Patient presents with:  Abdomen/Flank Pain (GI/)    Stated Complaint: R FLANK PAIN    HPI    65-year-old female comes the hospital with complaint having difficulty with pain by their of °C) (Temporal)   Resp 17   SpO2 95%         Physical Exam    HEENT : NCAT, EOMI, PEERL, throat clear, neck supple, no JVD, trachea midline, No LAD  Heart: S1S2 normal. No murmurs, regular rate and rhythm  Lungs: Clear to auscultation bilaterally  Abdomen: 10/07/2015, 5:47. INDICATIONS:  abdominal pain  TECHNIQUE:  Unenhanced multislice CT scanning from above the kidneys to below the urinary bladder.   2D rendering are generated on the CT scanner workstation to localize potential stones in the cranio-caudal Approved by: Dwaine Witt MD            Medications   morphINE sulfate (PF) 4 MG/ML injection 4 mg (4 mg Intravenous Given 8/10/18 5858)   ketorolac tromethamine (TORADOL) 30 MG/ML injection 30 mg (30 mg Intravenous Given 8/10/18 2015)   ondansetron

## 2018-08-30 ENCOUNTER — OFFICE VISIT (OUTPATIENT)
Dept: NEUROLOGY | Facility: CLINIC | Age: 58
End: 2018-08-30
Payer: COMMERCIAL

## 2018-08-30 VITALS
HEIGHT: 62 IN | BODY MASS INDEX: 29.81 KG/M2 | WEIGHT: 162 LBS | HEART RATE: 77 BPM | DIASTOLIC BLOOD PRESSURE: 95 MMHG | SYSTOLIC BLOOD PRESSURE: 138 MMHG | RESPIRATION RATE: 16 BRPM

## 2018-08-30 DIAGNOSIS — G43.001 MIGRAINE WITHOUT AURA AND WITH STATUS MIGRAINOSUS, NOT INTRACTABLE: Primary | ICD-10-CM

## 2018-08-30 PROCEDURE — 99213 OFFICE O/P EST LOW 20 MIN: CPT | Performed by: PHYSICIAN ASSISTANT

## 2018-08-30 RX ORDER — AMITRIPTYLINE HYDROCHLORIDE 10 MG/1
TABLET, FILM COATED ORAL
Qty: 30 TABLET | Refills: 5 | Status: SHIPPED | OUTPATIENT
Start: 2018-08-30 | End: 2019-03-20

## 2018-08-30 NOTE — PATIENT INSTRUCTIONS
Refill policies:    • Allow 2-3 business days for refills; controlled substances may take longer.   • Contact your pharmacy at least 5 days prior to running out of medication and have them send an electronic request or submit request through the “request re entire amount billed. Precertification and Prior Authorizations: If your physician has recommended that you have a procedure or additional testing performed.   [facility]) will contact your insurance carrier to obtain pre-certi

## 2018-09-06 RX ORDER — OMEGA-3-ACID ETHYL ESTERS 1 G/1
4 CAPSULE, LIQUID FILLED ORAL DAILY
Qty: 120 CAPSULE | Refills: 0 | Status: SHIPPED | OUTPATIENT
Start: 2018-09-06 | End: 2018-10-02

## 2018-10-02 RX ORDER — OMEGA-3-ACID ETHYL ESTERS 1 G/1
4 CAPSULE, LIQUID FILLED ORAL DAILY
Qty: 120 CAPSULE | Refills: 0 | Status: SHIPPED | OUTPATIENT
Start: 2018-10-02 | End: 2018-11-01

## 2018-10-14 ENCOUNTER — TELEPHONE (OUTPATIENT)
Dept: FAMILY MEDICINE CLINIC | Facility: CLINIC | Age: 58
End: 2018-10-14

## 2018-10-22 DIAGNOSIS — G43.909 MIGRAINE WITHOUT STATUS MIGRAINOSUS, NOT INTRACTABLE, UNSPECIFIED MIGRAINE TYPE: ICD-10-CM

## 2018-10-22 NOTE — TELEPHONE ENCOUNTER
Spoke to patient and she has not decreased amitriptyline 25 mg to 10 mg. Patient states that she will  rx on the amitriptyline 10 mg.  Disregard request

## 2018-10-22 NOTE — TELEPHONE ENCOUNTER
Medication: Amitriptyline 10 mg     Date of last refill: 8/30/18 with 5 addt refills  [place] medication     Last office visit: 8/30/18  Due back to clinic per last office note: RTN in 1 year  08/30/19     Da Non Face to Face CPT code 96613/08590 applies as documented above     PROCEDURE DONE     (   ) see notes  Visits are done with \"open doors and windows\" exam rooms, except when patient requests privacy                        Instructions         Return in

## 2018-10-23 RX ORDER — AMITRIPTYLINE HYDROCHLORIDE 25 MG/1
TABLET, FILM COATED ORAL
Qty: 90 TABLET | Refills: 0 | OUTPATIENT
Start: 2018-10-23

## 2018-10-31 RX ORDER — OMEPRAZOLE 40 MG/1
40 CAPSULE, DELAYED RELEASE ORAL
Qty: 90 CAPSULE | Refills: 1 | OUTPATIENT
Start: 2018-10-31

## 2018-10-31 RX ORDER — FLUOXETINE HYDROCHLORIDE 20 MG/1
20 CAPSULE ORAL
Qty: 90 CAPSULE | Refills: 1 | OUTPATIENT
Start: 2018-10-31

## 2018-11-01 RX ORDER — OMEGA-3-ACID ETHYL ESTERS 1 G/1
4 CAPSULE, LIQUID FILLED ORAL DAILY
Qty: 120 CAPSULE | Refills: 0 | Status: SHIPPED | OUTPATIENT
Start: 2018-11-01 | End: 2018-12-03

## 2018-11-19 RX ORDER — ATORVASTATIN CALCIUM 10 MG/1
TABLET, FILM COATED ORAL
Qty: 90 TABLET | Refills: 0 | Status: SHIPPED | OUTPATIENT
Start: 2018-11-19 | End: 2019-02-14

## 2018-11-27 RX ORDER — OMEPRAZOLE 40 MG/1
40 CAPSULE, DELAYED RELEASE ORAL
Qty: 90 CAPSULE | Refills: 0 | Status: SHIPPED | OUTPATIENT
Start: 2018-11-27 | End: 2019-02-22

## 2018-11-27 RX ORDER — FLUOXETINE HYDROCHLORIDE 20 MG/1
20 CAPSULE ORAL
Qty: 90 CAPSULE | Refills: 0 | Status: SHIPPED | OUTPATIENT
Start: 2018-11-27 | End: 2019-02-22

## 2018-11-27 NOTE — TELEPHONE ENCOUNTER
LOV 5/2018   Next visit 11/29     Rx pended    Fluoxetine last RF 3/2018   90 tabs, 1 RF     Omeprazole last RF 3/2018   90 tabs, 1 RF

## 2018-11-27 NOTE — TELEPHONE ENCOUNTER
Pt calling for update on refill request she only has enough meds for tomorrow and would like to  asap. Please advise.  Thank you

## 2018-11-29 ENCOUNTER — LAB ENCOUNTER (OUTPATIENT)
Dept: LAB | Age: 58
End: 2018-11-29
Attending: FAMILY MEDICINE
Payer: COMMERCIAL

## 2018-11-29 ENCOUNTER — OFFICE VISIT (OUTPATIENT)
Dept: FAMILY MEDICINE CLINIC | Facility: CLINIC | Age: 58
End: 2018-11-29
Payer: COMMERCIAL

## 2018-11-29 VITALS
WEIGHT: 160 LBS | DIASTOLIC BLOOD PRESSURE: 84 MMHG | BODY MASS INDEX: 28.71 KG/M2 | SYSTOLIC BLOOD PRESSURE: 154 MMHG | RESPIRATION RATE: 16 BRPM | HEIGHT: 62.5 IN | TEMPERATURE: 98 F | HEART RATE: 68 BPM

## 2018-11-29 DIAGNOSIS — E03.9 HYPOTHYROIDISM, UNSPECIFIED TYPE: ICD-10-CM

## 2018-11-29 DIAGNOSIS — Z23 NEED FOR VACCINATION: ICD-10-CM

## 2018-11-29 DIAGNOSIS — E78.2 ELEVATED CHOLESTEROL WITH HIGH TRIGLYCERIDES: ICD-10-CM

## 2018-11-29 DIAGNOSIS — Z13.89 SCREENING FOR GENITOURINARY CONDITION: ICD-10-CM

## 2018-11-29 DIAGNOSIS — E04.1 THYROID NODULE: ICD-10-CM

## 2018-11-29 DIAGNOSIS — E74.39 GLUCOSE INTOLERANCE: ICD-10-CM

## 2018-11-29 DIAGNOSIS — Z12.31 ENCOUNTER FOR SCREENING MAMMOGRAM FOR MALIGNANT NEOPLASM OF BREAST: ICD-10-CM

## 2018-11-29 DIAGNOSIS — Z00.00 ROUTINE GENERAL MEDICAL EXAMINATION AT A HEALTH CARE FACILITY: Primary | ICD-10-CM

## 2018-11-29 DIAGNOSIS — Z12.4 PAP SMEAR FOR CERVICAL CANCER SCREENING: ICD-10-CM

## 2018-11-29 DIAGNOSIS — Z11.51 SCREENING FOR HPV (HUMAN PAPILLOMAVIRUS): ICD-10-CM

## 2018-11-29 DIAGNOSIS — Z00.00 ROUTINE GENERAL MEDICAL EXAMINATION AT A HEALTH CARE FACILITY: ICD-10-CM

## 2018-11-29 PROCEDURE — 87624 HPV HI-RISK TYP POOLED RSLT: CPT | Performed by: FAMILY MEDICINE

## 2018-11-29 PROCEDURE — 80061 LIPID PANEL: CPT | Performed by: FAMILY MEDICINE

## 2018-11-29 PROCEDURE — 81003 URINALYSIS AUTO W/O SCOPE: CPT | Performed by: FAMILY MEDICINE

## 2018-11-29 PROCEDURE — 90471 IMMUNIZATION ADMIN: CPT | Performed by: FAMILY MEDICINE

## 2018-11-29 PROCEDURE — 84439 ASSAY OF FREE THYROXINE: CPT | Performed by: FAMILY MEDICINE

## 2018-11-29 PROCEDURE — 83036 HEMOGLOBIN GLYCOSYLATED A1C: CPT | Performed by: FAMILY MEDICINE

## 2018-11-29 PROCEDURE — 90686 IIV4 VACC NO PRSV 0.5 ML IM: CPT | Performed by: FAMILY MEDICINE

## 2018-11-29 PROCEDURE — 99396 PREV VISIT EST AGE 40-64: CPT | Performed by: FAMILY MEDICINE

## 2018-11-29 PROCEDURE — 36415 COLL VENOUS BLD VENIPUNCTURE: CPT | Performed by: FAMILY MEDICINE

## 2018-11-29 PROCEDURE — 80050 GENERAL HEALTH PANEL: CPT | Performed by: FAMILY MEDICINE

## 2018-11-29 RX ORDER — B-COMPLEX WITH VITAMIN C
TABLET ORAL
COMMUNITY
End: 2018-11-29

## 2018-11-29 NOTE — PATIENT INSTRUCTIONS
Check on insurance coverage for Shingrix. If covered, then ask your insurance if you should do it at the [address] [place] [address] office or pharmacy. We don't currently have it available.

## 2018-11-29 NOTE — PROGRESS NOTES
CHIEF COMPLAINT       Annual Physical Exam  HPI:   Jennie Prieto is a 60year old female who presents for a complete physical exam.   Would like to possibly try vascepa if lipids not good - will check today - currently on lovaza.     Wt Readings from [place] mouth once daily. Disp: 90 tablet Rfl: 1   BUTALBITAL-APAP-CAFFEINE -40 MG Oral Tab TAKE 1 TABLET BY MOUTH TWICE DAILY AS NEEDED FOR PAIN Disp: 60 tablet Rfl: 0   naproxen (NAPROSYN) 500 MG Oral Tab 1 po BID with food.  Disp: 30 tablet Rfl: 0   Multip complaint of chest pain on exertion  GI: no complaint of pain,denies heartburn  : No complains of dysuria or vaginal discharge  MUSCULOSKELETAL: no complaint of back pain  NEURO: no complaint of headaches  PSYCHE: no complaint ofdepression or anxiety  HE

## 2018-11-30 DIAGNOSIS — E74.39 GLUCOSE INTOLERANCE: Primary | ICD-10-CM

## 2018-12-03 RX ORDER — OMEGA-3-ACID ETHYL ESTERS 1 G/1
4 CAPSULE, LIQUID FILLED ORAL DAILY
Qty: 120 CAPSULE | Refills: 1 | Status: SHIPPED | OUTPATIENT
Start: 2018-12-03 | End: 2019-02-01

## 2018-12-12 ENCOUNTER — HOSPITAL ENCOUNTER (OUTPATIENT)
Dept: ULTRASOUND IMAGING | Facility: HOSPITAL | Age: 58
Discharge: HOME | End: 2018-12-12
Attending: FAMILY MEDICINE
Payer: COMMERCIAL

## 2018-12-12 DIAGNOSIS — E04.1 THYROID NODULE: Primary | ICD-10-CM

## 2018-12-12 DIAGNOSIS — E04.1 THYROID NODULE: ICD-10-CM

## 2018-12-12 PROCEDURE — 76536 US EXAM OF HEAD AND NECK: CPT | Performed by: FAMILY MEDICINE

## 2018-12-20 ENCOUNTER — HOSPITAL ENCOUNTER (OUTPATIENT)
Dept: MAMMOGRAPHY | Age: 58
Discharge: HOME | End: 2018-12-20
Attending: FAMILY MEDICINE
Payer: COMMERCIAL

## 2018-12-20 DIAGNOSIS — Z12.31 ENCOUNTER FOR SCREENING MAMMOGRAM FOR MALIGNANT NEOPLASM OF BREAST: ICD-10-CM

## 2018-12-20 PROCEDURE — 77063 BREAST TOMOSYNTHESIS BI: CPT | Performed by: FAMILY MEDICINE

## 2018-12-20 PROCEDURE — 77067 SCR MAMMO BI INCL CAD: CPT | Performed by: FAMILY MEDICINE

## 2018-12-27 ENCOUNTER — TELEPHONE (OUTPATIENT)
Dept: FAMILY MEDICINE CLINIC | Facility: CLINIC | Age: 58
End: 2018-12-27

## 2018-12-27 NOTE — TELEPHONE ENCOUNTER
Patient called stated a test result came through my chart that she has breast cancer. ...result does not reflect this. Sent live to triage.

## 2018-12-27 NOTE — TELEPHONE ENCOUNTER
Pt called with concern that she has breast ca. She states that it was sent to her via [facility]. Informed pt that she doesn't have breast cancer and that she was reading the diagnosis we used for the test.  Pt voiced understanding.

## 2019-01-07 ENCOUNTER — TELEPHONE (OUTPATIENT)
Dept: FAMILY MEDICINE CLINIC | Facility: CLINIC | Age: 59
End: 2019-01-07

## 2019-01-07 NOTE — TELEPHONE ENCOUNTER
This should be for Lalito Rod. ... Patient saw Monik Sequeira for a physical in December, she told her to keep record of BP. Patient states she has been doing this everyday and it keeps getting higher.      This morning it was 142/85, she says that is

## 2019-01-07 NOTE — TELEPHONE ENCOUNTER
I spoke with patient her BP readings are as follows:   01/06/19:  8am 130/82  2PM 113/69  7:70 /77    01/07/19:  6AM 142/85  2:30 /76    Pt takes Losartan 50/12.5 mg 1 q am   Pt is aware we will not get back with her  until Tuesday 01/08/19.

## 2019-01-08 RX ORDER — LOSARTAN POTASSIUM AND HYDROCHLOROTHIAZIDE 12.5; 1 MG/1; MG/1
1 TABLET ORAL DAILY
Qty: 30 TABLET | Refills: 0 | Status: SHIPPED | OUTPATIENT
Start: 2019-01-08 | End: 2019-03-21

## 2019-01-08 NOTE — TELEPHONE ENCOUNTER
Call to pt-advised of alondra HARPER instructions, explained rationale and informed new med order sent to [facility] in record. Patient voices understanding/agrees with plan/no further questions.

## 2019-01-29 ENCOUNTER — PATIENT MESSAGE (OUTPATIENT)
Dept: FAMILY MEDICINE CLINIC | Facility: CLINIC | Age: 59
End: 2019-01-29

## 2019-01-29 NOTE — TELEPHONE ENCOUNTER
From: Rajeev Musa  To: Marisa Lamar  Sent: 1/29/2019 3:11 PM CST  Subject: Prescription Question    Ellard Lenny I was wondering if you could call me when you get a chance.  It's about my blood pressure I'm not good with texting ([phone]

## 2019-01-31 NOTE — TELEPHONE ENCOUNTER
Are her BP readings higher since we increased her medication or are they just not decreasing with change of medication?

## 2019-01-31 NOTE — TELEPHONE ENCOUNTER
S/w pt. States \"first thing in the am, my bp is super high\"  Reports random readings to me, 154/92, 141/89. Reports these are ~10 min before she takes her rx of losartan hctz. I stated her bp would not drop significantly in that amt of time.  Asked for

## 2019-02-01 RX ORDER — OMEGA-3-ACID ETHYL ESTERS 1 G/1
4 CAPSULE, LIQUID FILLED ORAL DAILY
Qty: 120 CAPSULE | Refills: 1 | Status: SHIPPED | OUTPATIENT
Start: 2019-02-01 | End: 2019-03-29

## 2019-02-04 NOTE — PROGRESS NOTES
Please call pt back. Per S Dressler's note she wants the pt to be called and discuss the BP readings. Per S Josef Myles if pt needs to come in she can come in tomorrow to talk about the readings (2/5/19 at 1:45).  Pt is regardless coming at that time for a c

## 2019-02-05 ENCOUNTER — OFFICE VISIT (OUTPATIENT)
Dept: FAMILY MEDICINE CLINIC | Facility: CLINIC | Age: 59
End: 2019-02-05
Payer: COMMERCIAL

## 2019-02-05 VITALS
WEIGHT: 163 LBS | DIASTOLIC BLOOD PRESSURE: 88 MMHG | BODY MASS INDEX: 29.25 KG/M2 | TEMPERATURE: 98 F | RESPIRATION RATE: 16 BRPM | HEIGHT: 62.5 IN | HEART RATE: 84 BPM | SYSTOLIC BLOOD PRESSURE: 144 MMHG

## 2019-02-05 DIAGNOSIS — R60.9 PAROTID SWELLING: Primary | ICD-10-CM

## 2019-02-05 DIAGNOSIS — I10 BENIGN ESSENTIAL HYPERTENSION: ICD-10-CM

## 2019-02-05 PROCEDURE — 99214 OFFICE O/P EST MOD 30 MIN: CPT | Performed by: FAMILY MEDICINE

## 2019-02-05 RX ORDER — LOSARTAN POTASSIUM AND HYDROCHLOROTHIAZIDE 25; 100 MG/1; MG/1
1 TABLET ORAL DAILY
Qty: 30 TABLET | Refills: 1 | Status: SHIPPED | OUTPATIENT
Start: 2019-02-05 | End: 2019-03-21

## 2019-02-05 RX ORDER — AMOXICILLIN AND CLAVULANATE POTASSIUM 875; 125 MG/1; MG/1
1 TABLET, FILM COATED ORAL 2 TIMES DAILY
Qty: 20 TABLET | Refills: 0 | Status: SHIPPED | OUTPATIENT
Start: 2019-02-05 | End: 2019-02-15

## 2019-02-05 NOTE — PROGRESS NOTES
Aleyda Quinn is a 60year old female. CHIEF COMPLAINT   Check left neck/face swelling. Also follow up on HTN  HPI:   Left neck swollen for 2 weeks.   Then  noticed the swelling and was about the size of an egg now smaller again but still present Use      Smoking status: Former Smoker        Packs/day: 1.00        Years: 28.00        Pack years: 28        Types: Cigarettes        Quit date: 2000        Years since quittin.4      Smokeless tobacco: Never Used    Alcohol use: No      Alcoho above.

## 2019-02-05 NOTE — PATIENT INSTRUCTIONS
Stop losartan HCTZ 100/12.5. Start losartan HCTZ 100/25 once daily. See in 6 weeks for recheck - bring home readings. Take a probiotic while on antibiotic. Check ultrasound of parotid. See the ENT to check the swelling.

## 2019-02-06 ENCOUNTER — HOSPITAL ENCOUNTER (OUTPATIENT)
Dept: ULTRASOUND IMAGING | Facility: HOSPITAL | Age: 59
Discharge: HOME | End: 2019-02-06
Attending: FAMILY MEDICINE
Payer: COMMERCIAL

## 2019-02-06 DIAGNOSIS — R60.9 PAROTID SWELLING: ICD-10-CM

## 2019-02-06 PROCEDURE — 76536 US EXAM OF HEAD AND NECK: CPT | Performed by: FAMILY MEDICINE

## 2019-02-07 RX ORDER — LOSARTAN POTASSIUM AND HYDROCHLOROTHIAZIDE 12.5; 1 MG/1; MG/1
TABLET ORAL
Qty: 30 TABLET | Refills: 0 | OUTPATIENT
Start: 2019-02-07

## 2019-02-11 DIAGNOSIS — G43.909 MIGRAINE WITHOUT STATUS MIGRAINOSUS, NOT INTRACTABLE, UNSPECIFIED MIGRAINE TYPE: ICD-10-CM

## 2019-02-11 RX ORDER — BUTALBITAL, ACETAMINOPHEN AND CAFFEINE 50; 325; 40 MG/1; MG/1; MG/1
TABLET ORAL
Qty: 60 TABLET | Refills: 5 | Status: SHIPPED
Start: 2019-02-11 | End: 2020-03-30

## 2019-02-11 NOTE — TELEPHONE ENCOUNTER
Medication: Butalbital 50/325/40 mg     Date of last refill:01/30/18  [place] for [place] medication     Last office visit: 8/30/18  Due back to clinic per last office note: RTN in 1 year  08/30/19     Date next offic Non Face to Face CPT code 37089/09878 applies as documented above     PROCEDURE DONE     (   ) see notes  Visits are done with \"open doors and windows\" exam rooms, except when patient requests privacy                        Instructions          Return i

## 2019-02-12 NOTE — TELEPHONE ENCOUNTER
Prescription approved for Butalbital and faxed to [facility] pharmacy with receipt confirmation.

## 2019-02-14 RX ORDER — ATORVASTATIN CALCIUM 10 MG/1
TABLET, FILM COATED ORAL
Qty: 90 TABLET | Refills: 0 | Status: SHIPPED | OUTPATIENT
Start: 2019-02-14 | End: 2019-05-20

## 2019-02-22 RX ORDER — FLUOXETINE HYDROCHLORIDE 20 MG/1
CAPSULE ORAL
Qty: 90 CAPSULE | Refills: 0 | Status: SHIPPED | OUTPATIENT
Start: 2019-02-22 | End: 2019-05-20

## 2019-02-22 RX ORDER — OMEPRAZOLE 40 MG/1
CAPSULE, DELAYED RELEASE ORAL
Qty: 90 CAPSULE | Refills: 0 | Status: SHIPPED | OUTPATIENT
Start: 2019-02-22 | End: 2019-05-20

## 2019-03-14 RX ORDER — LEVOTHYROXINE SODIUM 0.1 MG/1
100 TABLET ORAL
Qty: 90 TABLET | Refills: 0 | Status: SHIPPED | OUTPATIENT
Start: 2019-03-14 | End: 2019-06-12

## 2019-03-20 ENCOUNTER — TELEPHONE (OUTPATIENT)
Dept: NEUROLOGY | Facility: CLINIC | Age: 59
End: 2019-03-20

## 2019-03-20 DIAGNOSIS — G43.001 MIGRAINE WITHOUT AURA AND WITH STATUS MIGRAINOSUS, NOT INTRACTABLE: Primary | ICD-10-CM

## 2019-03-20 RX ORDER — AMITRIPTYLINE HYDROCHLORIDE 25 MG/1
TABLET, FILM COATED ORAL
Qty: 30 TABLET | Refills: 5 | Status: SHIPPED | OUTPATIENT
Start: 2019-03-20 | End: 2019-08-12

## 2019-03-20 NOTE — TELEPHONE ENCOUNTER
Patient currently taking Amitriptyline 10 mg. But due to increase in the frequency of  headaches she wants to increase her dosage. Previously pt used to take Amitriptyline 25 mg.  Pt is requesting Amitriptyline 25 mg PO Qhs. Please review the refill reque

## 2019-03-20 NOTE — TELEPHONE ENCOUNTER
Amitriptyline - pt stated lowered dosage down to 10mg once a day 3 weeks ago and experiencing more headaches now. Pt needs a refill and wondering if should increase dosage back up.     Pt needs a call back on what to do and prescription refill since spencer

## 2019-03-21 ENCOUNTER — OFFICE VISIT (OUTPATIENT)
Dept: FAMILY MEDICINE CLINIC | Facility: CLINIC | Age: 59
End: 2019-03-21
Payer: COMMERCIAL

## 2019-03-21 VITALS
DIASTOLIC BLOOD PRESSURE: 76 MMHG | RESPIRATION RATE: 14 BRPM | HEART RATE: 74 BPM | BODY MASS INDEX: 28.71 KG/M2 | TEMPERATURE: 98 F | HEIGHT: 62.5 IN | SYSTOLIC BLOOD PRESSURE: 124 MMHG | WEIGHT: 160 LBS

## 2019-03-21 DIAGNOSIS — I10 BENIGN ESSENTIAL HYPERTENSION: Primary | ICD-10-CM

## 2019-03-21 PROCEDURE — 99213 OFFICE O/P EST LOW 20 MIN: CPT | Performed by: FAMILY MEDICINE

## 2019-03-21 RX ORDER — LOSARTAN POTASSIUM AND HYDROCHLOROTHIAZIDE 25; 100 MG/1; MG/1
1 TABLET ORAL DAILY
Qty: 90 TABLET | Refills: 1 | Status: SHIPPED | OUTPATIENT
Start: 2019-03-21 | End: 2019-12-31 | Stop reason: SDUPTHER

## 2019-03-21 NOTE — PROGRESS NOTES
CHIEF COMPLAINT:   No Centeno a 60year old female is here for followup on HTN  HPI:   No Centeno has been doing well since the last visit here. No Centeno  is compliant with hypertensive medication. No chest pain. No dyspnea.  No palpit

## 2019-03-29 RX ORDER — OMEGA-3-ACID ETHYL ESTERS 1 G/1
4 CAPSULE, LIQUID FILLED ORAL DAILY
Qty: 120 CAPSULE | Refills: 1 | Status: SHIPPED | OUTPATIENT
Start: 2019-03-29 | End: 2019-04-29

## 2019-04-29 RX ORDER — OMEGA-3-ACID ETHYL ESTERS 1 G/1
4 CAPSULE, LIQUID FILLED ORAL DAILY
Qty: 120 CAPSULE | Refills: 1 | Status: SHIPPED | OUTPATIENT
Start: 2019-04-29 | End: 2019-05-30 | Stop reason: SDUPTHER

## 2019-05-20 RX ORDER — ATORVASTATIN CALCIUM 10 MG/1
TABLET, FILM COATED ORAL
Qty: 90 TABLET | Refills: 0 | Status: SHIPPED | OUTPATIENT
Start: 2019-05-20 | End: 2019-08-17

## 2019-05-20 RX ORDER — OMEPRAZOLE 40 MG/1
CAPSULE, DELAYED RELEASE ORAL
Qty: 90 CAPSULE | Refills: 0 | Status: SHIPPED | OUTPATIENT
Start: 2019-05-20 | End: 2019-08-17

## 2019-05-20 RX ORDER — FLUOXETINE HYDROCHLORIDE 20 MG/1
CAPSULE ORAL
Qty: 90 CAPSULE | Refills: 0 | Status: SHIPPED | OUTPATIENT
Start: 2019-05-20 | End: 2019-08-17

## 2019-05-30 RX ORDER — OMEGA-3-ACID ETHYL ESTERS 1 G/1
4 CAPSULE, LIQUID FILLED ORAL DAILY
Qty: 120 CAPSULE | Refills: 0 | Status: SHIPPED | OUTPATIENT
Start: 2019-05-30 | End: 2019-06-26

## 2019-06-12 RX ORDER — LEVOTHYROXINE SODIUM 0.1 MG/1
TABLET ORAL
Qty: 90 TABLET | Refills: 0 | Status: SHIPPED | OUTPATIENT
Start: 2019-06-12 | End: 2019-09-11

## 2019-06-27 RX ORDER — OMEGA-3-ACID ETHYL ESTERS 1 G/1
4 CAPSULE, LIQUID FILLED ORAL DAILY
Qty: 120 CAPSULE | Refills: 2 | Status: SHIPPED | OUTPATIENT
Start: 2019-06-27 | End: 2019-09-22

## 2019-08-12 DIAGNOSIS — G43.001 MIGRAINE WITHOUT AURA AND WITH STATUS MIGRAINOSUS, NOT INTRACTABLE: ICD-10-CM

## 2019-08-12 RX ORDER — AMITRIPTYLINE HYDROCHLORIDE 25 MG/1
TABLET, FILM COATED ORAL
Qty: 30 TABLET | Refills: 5 | Status: SHIPPED | OUTPATIENT
Start: 2019-08-12 | End: 2020-02-10

## 2019-08-19 ENCOUNTER — OFFICE VISIT (OUTPATIENT)
Dept: HEMATOLOGY/ONCOLOGY | Facility: HOSPITAL | Age: 59
End: 2019-08-19
Attending: INTERNAL MEDICINE
Payer: COMMERCIAL

## 2019-08-19 VITALS
TEMPERATURE: 97 F | HEART RATE: 87 BPM | RESPIRATION RATE: 20 BRPM | SYSTOLIC BLOOD PRESSURE: 121 MMHG | DIASTOLIC BLOOD PRESSURE: 74 MMHG

## 2019-08-19 DIAGNOSIS — D47.2 MONOCLONAL GAMMOPATHY: Primary | ICD-10-CM

## 2019-08-19 DIAGNOSIS — N28.9 RENAL INSUFFICIENCY: ICD-10-CM

## 2019-08-19 LAB
ALBUMIN SERPL-MCNC: 4 G/DL (ref 3.4–5)
ALBUMIN/GLOB SERPL: 0.8 {RATIO} (ref 1–2)
ALP LIVER SERPL-CCNC: 99 U/L (ref 46–118)
ALT SERPL-CCNC: 58 U/L (ref 13–56)
ANION GAP SERPL CALC-SCNC: 5 MMOL/L (ref 0–18)
AST SERPL-CCNC: 42 U/L (ref 15–37)
BASOPHILS # BLD AUTO: 0.05 X10(3) UL (ref 0–0.2)
BASOPHILS NFR BLD AUTO: 0.6 %
BILIRUB SERPL-MCNC: 0.3 MG/DL (ref 0.1–2)
BUN BLD-MCNC: 18 MG/DL (ref 7–18)
BUN/CREAT SERPL: 15 (ref 10–20)
CALCIUM BLD-MCNC: 10 MG/DL (ref 8.5–10.1)
CHLORIDE SERPL-SCNC: 104 MMOL/L (ref 98–112)
CO2 SERPL-SCNC: 31 MMOL/L (ref 21–32)
CREAT BLD-MCNC: 1.2 MG/DL (ref 0.55–1.02)
DEPRECATED RDW RBC AUTO: 42.9 FL (ref 35.1–46.3)
EOSINOPHIL # BLD AUTO: 0.06 X10(3) UL (ref 0–0.7)
EOSINOPHIL NFR BLD AUTO: 0.8 %
ERYTHROCYTE [DISTWIDTH] IN BLOOD BY AUTOMATED COUNT: 12.5 % (ref 11–15)
GLOBULIN PLAS-MCNC: 4.8 G/DL (ref 2.8–4.4)
GLUCOSE BLD-MCNC: 82 MG/DL (ref 70–99)
HCT VFR BLD AUTO: 38.9 % (ref 35–48)
HGB BLD-MCNC: 13.1 G/DL (ref 12–16)
IGA SERPL-MCNC: 115 MG/DL (ref 70–312)
IGM SERPL-MCNC: 134 MG/DL (ref 43–279)
IMM GRANULOCYTES # BLD AUTO: 0.01 X10(3) UL (ref 0–1)
IMM GRANULOCYTES NFR BLD: 0.1 %
IMMUNOGLOBULIN PNL SER-MCNC: 1600 MG/DL (ref 791–1643)
LDH SERPL L TO P-CCNC: 195 U/L (ref 84–246)
LYMPHOCYTES # BLD AUTO: 2.72 X10(3) UL (ref 1–4)
LYMPHOCYTES NFR BLD AUTO: 34.3 %
M PROTEIN MFR SERPL ELPH: 8.8 G/DL (ref 6.4–8.2)
MCH RBC QN AUTO: 31.3 PG (ref 26–34)
MCHC RBC AUTO-ENTMCNC: 33.7 G/DL (ref 31–37)
MCV RBC AUTO: 93.1 FL (ref 80–100)
MONOCYTES # BLD AUTO: 0.48 X10(3) UL (ref 0.1–1)
MONOCYTES NFR BLD AUTO: 6 %
NEUTROPHILS # BLD AUTO: 4.62 X10 (3) UL (ref 1.5–7.7)
NEUTROPHILS # BLD AUTO: 4.62 X10(3) UL (ref 1.5–7.7)
NEUTROPHILS NFR BLD AUTO: 58.2 %
OSMOLALITY SERPL CALC.SUM OF ELEC: 291 MOSM/KG (ref 275–295)
PLATELET # BLD AUTO: 251 10(3)UL (ref 150–450)
POTASSIUM SERPL-SCNC: 3.8 MMOL/L (ref 3.5–5.1)
RBC # BLD AUTO: 4.18 X10(6)UL (ref 3.8–5.3)
SODIUM SERPL-SCNC: 140 MMOL/L (ref 136–145)
WBC # BLD AUTO: 7.9 X10(3) UL (ref 4–11)

## 2019-08-19 PROCEDURE — 99214 OFFICE O/P EST MOD 30 MIN: CPT | Performed by: INTERNAL MEDICINE

## 2019-08-19 NOTE — TELEPHONE ENCOUNTER
Please call pt for follow up Depression, Allergy, HTN, Thyroid and cholesterol for next month with Sarah Harrell prior to sending #90. Thanks.

## 2019-08-19 NOTE — PROGRESS NOTES
[facility] Report of Consultation    Patient Name: Maye Sands   YOB: 1960   Medical Record Number: [id number]   CSN: [id number]   Consulting Physician: Sergey Moore MD  Referring Physician(s): No ref.  provider found  Date of Consult on file      Number of children: 1      Years of education: Not on file      Highest education level: Not on file    Occupational History      Not on file    Social Needs      Financial resource strain: Not on file      Food insecurity:        Worry: Not o Social History Narrative      Not on file      Allergies:     Beef                        Comment:Derived Products  Milk Thistle                Comment:Powd  Peanuts                     Comment:Derived Products    Current Medications:    Current Outpatient for easy bruising, bleeding, and lymphadenopathy. Musculoskeletal: Negative for myalgias, arthralgias, muscle weakness.   Genitourinary: Negative for dysuria or hematuria  Neurological: Negative for headaches, dizziness, speech problems, gait problems and Component Value Date/Time     08/19/2019 1451      Ref Range & Units 8/19/19 1451   Immunoglobulin A 70.00 - 312.00 mg/dL 115.00    Immunoglobulin G 791-1,643 mg/dL 1,600    Immunoglobulin M 43.0 - 279.0 mg/dL 134.0         Radiologic imaging revi increased. The IgG level remains low at 1600. I also sent a monoclonal protein study to see if the light chains or the m-protein has increased. I will call her with the results.  I recommended that we continue to follow these labs yearly if they prove to be

## 2019-08-20 RX ORDER — FLUOXETINE HYDROCHLORIDE 20 MG/1
CAPSULE ORAL
Qty: 90 CAPSULE | Refills: 0 | Status: SHIPPED | OUTPATIENT
Start: 2019-08-20 | End: 2019-11-15

## 2019-08-20 RX ORDER — OMEPRAZOLE 40 MG/1
CAPSULE, DELAYED RELEASE ORAL
Qty: 90 CAPSULE | Refills: 0 | Status: SHIPPED | OUTPATIENT
Start: 2019-08-20 | End: 2019-11-15

## 2019-08-20 RX ORDER — ATORVASTATIN CALCIUM 10 MG/1
TABLET, FILM COATED ORAL
Qty: 90 TABLET | Refills: 0 | Status: SHIPPED | OUTPATIENT
Start: 2019-08-20 | End: 2019-11-15

## 2019-08-27 LAB
ALBUMIN SERPL-MCNC: 4.76 G/DL (ref 3.1–4.5)
ALBUMIN/GLOB SERPL: 1.38 {RATIO}
ALPHA1 GLOB SERPL ELPH-MCNC: 0.22 G/DL (ref 0.1–0.3)
ALPHA2 GLOB SERPL ELPH-MCNC: 0.93 G/DL (ref 0.6–1)
B-GLOBULIN SERPL ELPH-MCNC: 0.92 G/DL (ref 0.7–1.2)
GAMMA GLOB SERPL ELPH-MCNC: 1.37 G/DL (ref 0.6–1.6)
KAPPA FREE LIGHT CHAIN: 1.74 MG/DL (ref 0.33–1.94)
KAPPA/LAMBDA FLC RATIO: 1.19 (ref 0.26–1.65)
LAMBDA FREE LIGHT CHAIN: 1.46 MG/DL (ref 0.57–2.63)
M PROTEIN MFR SERPL ELPH: 8.2 G/DL (ref 6.4–8.2)
M-SPIKE 1: 0.88 G/DL (ref ?–0)

## 2019-09-11 ENCOUNTER — OFFICE VISIT (OUTPATIENT)
Dept: NEUROLOGY | Facility: CLINIC | Age: 59
End: 2019-09-11
Payer: COMMERCIAL

## 2019-09-11 ENCOUNTER — TELEPHONE (OUTPATIENT)
Dept: HEMATOLOGY/ONCOLOGY | Facility: HOSPITAL | Age: 59
End: 2019-09-11

## 2019-09-11 VITALS
DIASTOLIC BLOOD PRESSURE: 55 MMHG | RESPIRATION RATE: 16 BRPM | HEIGHT: 62.5 IN | BODY MASS INDEX: 28.71 KG/M2 | WEIGHT: 160 LBS | SYSTOLIC BLOOD PRESSURE: 124 MMHG | HEART RATE: 62 BPM

## 2019-09-11 DIAGNOSIS — G43.009 MIGRAINE WITHOUT AURA AND WITHOUT STATUS MIGRAINOSUS, NOT INTRACTABLE: Primary | ICD-10-CM

## 2019-09-11 DIAGNOSIS — D47.2 MONOCLONAL GAMMOPATHY: Primary | ICD-10-CM

## 2019-09-11 PROCEDURE — 99213 OFFICE O/P EST LOW 20 MIN: CPT | Performed by: PHYSICIAN ASSISTANT

## 2019-09-11 NOTE — TELEPHONE ENCOUNTER
Patient was informed that her levels were low per MD Urias.  She was instructed to follow up in 6 months with blood work per MD Roxine Cushing

## 2019-09-11 NOTE — PROGRESS NOTES
[facility] [address]  3/16/1960  Primary Care Provider:  Brett Ayon MD    9/11/2019  Accompanied visit: No      60year old female patient being seen for: Migraine tablet, Rfl: 0  •  Multiple Vitamin (MULTIVITAMIN OR), Take  by mouth., Disp: , Rfl:   •  Cholecalciferol (VITAMIN D) 2000 UNIT Oral Tab, 3 tabs daily, Disp: , Rfl:   •  Calcium Carbonate-Vitamin D (CALCIUM + D OR), None Entered, Disp: , Rfl:   PRN:     Al

## 2019-09-12 RX ORDER — LEVOTHYROXINE SODIUM 0.1 MG/1
TABLET ORAL
Qty: 30 TABLET | Refills: 0 | Status: SHIPPED | OUTPATIENT
Start: 2019-09-12 | End: 2019-09-23

## 2019-09-23 ENCOUNTER — OFFICE VISIT (OUTPATIENT)
Dept: FAMILY MEDICINE CLINIC | Facility: CLINIC | Age: 59
End: 2019-09-23
Payer: COMMERCIAL

## 2019-09-23 VITALS
HEIGHT: 62.5 IN | TEMPERATURE: 97 F | SYSTOLIC BLOOD PRESSURE: 124 MMHG | RESPIRATION RATE: 12 BRPM | DIASTOLIC BLOOD PRESSURE: 74 MMHG | BODY MASS INDEX: 28.89 KG/M2 | WEIGHT: 161 LBS | HEART RATE: 76 BPM

## 2019-09-23 DIAGNOSIS — E74.39 GLUCOSE INTOLERANCE: ICD-10-CM

## 2019-09-23 DIAGNOSIS — I10 BENIGN ESSENTIAL HYPERTENSION: Primary | ICD-10-CM

## 2019-09-23 DIAGNOSIS — Z12.39 BREAST CANCER SCREENING: ICD-10-CM

## 2019-09-23 DIAGNOSIS — E78.5 HYPERLIPIDEMIA, UNSPECIFIED HYPERLIPIDEMIA TYPE: ICD-10-CM

## 2019-09-23 DIAGNOSIS — E03.9 HYPOTHYROIDISM, UNSPECIFIED TYPE: ICD-10-CM

## 2019-09-23 DIAGNOSIS — Z23 NEED FOR VACCINATION: ICD-10-CM

## 2019-09-23 PROCEDURE — 99214 OFFICE O/P EST MOD 30 MIN: CPT | Performed by: FAMILY MEDICINE

## 2019-09-23 PROCEDURE — 90471 IMMUNIZATION ADMIN: CPT | Performed by: FAMILY MEDICINE

## 2019-09-23 PROCEDURE — 90686 IIV4 VACC NO PRSV 0.5 ML IM: CPT | Performed by: FAMILY MEDICINE

## 2019-09-23 RX ORDER — OMEGA-3-ACID ETHYL ESTERS 1 G/1
4 CAPSULE, LIQUID FILLED ORAL DAILY
Qty: 360 CAPSULE | Refills: 1 | Status: SHIPPED | OUTPATIENT
Start: 2019-09-23 | End: 2020-03-23

## 2019-09-23 RX ORDER — LEVOTHYROXINE SODIUM 0.1 MG/1
100 TABLET ORAL
Qty: 90 TABLET | Refills: 0 | Status: SHIPPED | OUTPATIENT
Start: 2019-09-23 | End: 2019-12-31

## 2019-09-23 NOTE — PROGRESS NOTES
CHIEF COMPLAINT:   Maye Sands a 60year old female is here for followup on HTN  HPI:   Maye Sands has been doing well since the last visit here. Maye Sands  is compliant with hypertensive medication. No chest pain. No dyspnea.  No palpit Sodium 100 MCG Oral Tab 90 tablet 0     Sig: Take 1 tablet (100 mcg total) by mouth once daily.        Imaging & Consults:  FLULAVAL INFLUENZA VACCINE QUAD PRESERVATIVE FREE 0.5 ML  [facility] GEMINI 2D+3D SCREENING BILAT (CPT=77067/60482)   Continue current managem

## 2019-09-23 NOTE — PATIENT INSTRUCTIONS
Schedule repeat thyroid ultrasound. Call [phone] to schedule. Check labs from me toward the end of October. Orders are in . Fast 12 hours.

## 2019-10-03 RX ORDER — LOSARTAN POTASSIUM AND HYDROCHLOROTHIAZIDE 25; 100 MG/1; MG/1
TABLET ORAL
Qty: 90 TABLET | Refills: 1 | OUTPATIENT
Start: 2019-10-03

## 2019-10-03 RX ORDER — LOSARTAN POTASSIUM 100 MG/1
100 TABLET ORAL DAILY
Qty: 90 TABLET | Refills: 0 | Status: SHIPPED | OUTPATIENT
Start: 2019-10-03 | End: 2019-12-31

## 2019-10-03 RX ORDER — HYDROCHLOROTHIAZIDE 25 MG/1
25 TABLET ORAL DAILY
Qty: 90 TABLET | Refills: 0 | Status: SHIPPED | OUTPATIENT
Start: 2019-10-03 | End: 2019-12-31

## 2019-10-10 RX ORDER — LOSARTAN POTASSIUM AND HYDROCHLOROTHIAZIDE 25; 100 MG/1; MG/1
TABLET ORAL
Qty: 90 TABLET | Refills: 1 | OUTPATIENT
Start: 2019-10-10

## 2019-10-30 ENCOUNTER — HOSPITAL ENCOUNTER (OUTPATIENT)
Dept: ULTRASOUND IMAGING | Facility: HOSPITAL | Age: 59
Discharge: HOME | End: 2019-10-30
Attending: FAMILY MEDICINE
Payer: COMMERCIAL

## 2019-10-30 DIAGNOSIS — E04.1 THYROID NODULE: ICD-10-CM

## 2019-10-30 PROCEDURE — 76536 US EXAM OF HEAD AND NECK: CPT | Performed by: FAMILY MEDICINE

## 2019-10-31 ENCOUNTER — APPOINTMENT (OUTPATIENT)
Dept: LAB | Age: 59
End: 2019-10-31
Attending: FAMILY MEDICINE
Payer: COMMERCIAL

## 2019-10-31 DIAGNOSIS — E03.9 HYPOTHYROIDISM, UNSPECIFIED TYPE: ICD-10-CM

## 2019-10-31 DIAGNOSIS — E74.39 GLUCOSE INTOLERANCE: ICD-10-CM

## 2019-10-31 DIAGNOSIS — I10 BENIGN ESSENTIAL HYPERTENSION: ICD-10-CM

## 2019-10-31 DIAGNOSIS — E78.5 HYPERLIPIDEMIA, UNSPECIFIED HYPERLIPIDEMIA TYPE: ICD-10-CM

## 2019-10-31 PROCEDURE — 84443 ASSAY THYROID STIM HORMONE: CPT | Performed by: FAMILY MEDICINE

## 2019-10-31 PROCEDURE — 80053 COMPREHEN METABOLIC PANEL: CPT | Performed by: FAMILY MEDICINE

## 2019-10-31 PROCEDURE — 83036 HEMOGLOBIN GLYCOSYLATED A1C: CPT | Performed by: FAMILY MEDICINE

## 2019-10-31 PROCEDURE — 84439 ASSAY OF FREE THYROXINE: CPT | Performed by: FAMILY MEDICINE

## 2019-10-31 PROCEDURE — 36415 COLL VENOUS BLD VENIPUNCTURE: CPT | Performed by: FAMILY MEDICINE

## 2019-10-31 PROCEDURE — 80061 LIPID PANEL: CPT | Performed by: FAMILY MEDICINE

## 2019-11-01 ENCOUNTER — TELEPHONE (OUTPATIENT)
Dept: FAMILY MEDICINE CLINIC | Facility: CLINIC | Age: 59
End: 2019-11-01

## 2019-11-01 DIAGNOSIS — E74.39 GLUCOSE INTOLERANCE: ICD-10-CM

## 2019-11-01 DIAGNOSIS — K76.0 FATTY LIVER: ICD-10-CM

## 2019-11-01 DIAGNOSIS — E78.5 HYPERLIPIDEMIA, UNSPECIFIED HYPERLIPIDEMIA TYPE: Primary | ICD-10-CM

## 2019-11-01 DIAGNOSIS — R79.89 ELEVATED LIVER FUNCTION TESTS: ICD-10-CM

## 2019-11-15 RX ORDER — ATORVASTATIN CALCIUM 10 MG/1
TABLET, FILM COATED ORAL
Qty: 90 TABLET | Refills: 1 | Status: SHIPPED | OUTPATIENT
Start: 2019-11-15 | End: 2020-05-04

## 2019-11-15 RX ORDER — OMEPRAZOLE 40 MG/1
CAPSULE, DELAYED RELEASE ORAL
Qty: 90 CAPSULE | Refills: 1 | Status: SHIPPED | OUTPATIENT
Start: 2019-11-15 | End: 2020-05-04

## 2019-11-15 RX ORDER — FLUOXETINE HYDROCHLORIDE 20 MG/1
CAPSULE ORAL
Qty: 90 CAPSULE | Refills: 1 | Status: SHIPPED | OUTPATIENT
Start: 2019-11-15 | End: 2020-05-04

## 2019-11-27 ENCOUNTER — HOSPITAL ENCOUNTER (OUTPATIENT)
Dept: ULTRASOUND IMAGING | Facility: HOSPITAL | Age: 59
Discharge: HOME | End: 2019-11-27
Attending: FAMILY MEDICINE
Payer: COMMERCIAL

## 2019-11-27 DIAGNOSIS — R79.89 ELEVATED LIVER FUNCTION TESTS: ICD-10-CM

## 2019-11-27 DIAGNOSIS — K76.0 FATTY LIVER: ICD-10-CM

## 2019-11-27 PROCEDURE — 76705 ECHO EXAM OF ABDOMEN: CPT | Performed by: FAMILY MEDICINE

## 2019-11-27 PROCEDURE — 76981 USE PARENCHYMA: CPT | Performed by: FAMILY MEDICINE

## 2019-12-05 ENCOUNTER — OFFICE VISIT (OUTPATIENT)
Dept: SURGERY | Facility: CLINIC | Age: 59
End: 2019-12-05
Payer: COMMERCIAL

## 2019-12-05 ENCOUNTER — LAB ENCOUNTER (OUTPATIENT)
Dept: LAB | Age: 59
End: 2019-12-05
Attending: INTERNAL MEDICINE
Payer: COMMERCIAL

## 2019-12-05 VITALS
RESPIRATION RATE: 16 BRPM | HEART RATE: 67 BPM | OXYGEN SATURATION: 99 % | WEIGHT: 154 LBS | SYSTOLIC BLOOD PRESSURE: 132 MMHG | DIASTOLIC BLOOD PRESSURE: 85 MMHG | BODY MASS INDEX: 28 KG/M2

## 2019-12-05 DIAGNOSIS — R79.89 ABNORMAL LIVER FUNCTION TESTS: Primary | ICD-10-CM

## 2019-12-05 DIAGNOSIS — R79.89 ABNORMAL LIVER FUNCTION TESTS: ICD-10-CM

## 2019-12-05 PROCEDURE — 86706 HEP B SURFACE ANTIBODY: CPT

## 2019-12-05 PROCEDURE — 86038 ANTINUCLEAR ANTIBODIES: CPT

## 2019-12-05 PROCEDURE — 82104 ALPHA-1-ANTITRYPSIN PHENO: CPT

## 2019-12-05 PROCEDURE — 83516 IMMUNOASSAY NONANTIBODY: CPT

## 2019-12-05 PROCEDURE — 36415 COLL VENOUS BLD VENIPUNCTURE: CPT

## 2019-12-05 PROCEDURE — 82784 ASSAY IGA/IGD/IGG/IGM EACH: CPT

## 2019-12-05 PROCEDURE — 82103 ALPHA-1-ANTITRYPSIN TOTAL: CPT

## 2019-12-05 PROCEDURE — 82728 ASSAY OF FERRITIN: CPT

## 2019-12-05 PROCEDURE — 86704 HEP B CORE ANTIBODY TOTAL: CPT

## 2019-12-05 PROCEDURE — 87340 HEPATITIS B SURFACE AG IA: CPT

## 2019-12-05 PROCEDURE — 80076 HEPATIC FUNCTION PANEL: CPT

## 2019-12-05 PROCEDURE — 86708 HEPATITIS A ANTIBODY: CPT

## 2019-12-05 PROCEDURE — 86803 HEPATITIS C AB TEST: CPT

## 2019-12-05 PROCEDURE — 86709 HEPATITIS A IGM ANTIBODY: CPT

## 2019-12-05 NOTE — PROGRESS NOTES
[facility]  [address], [address]  [address]  [phone] ([phone]) Used    Alcohol use: No      Alcohol/week: 0.0 standard drinks    Drug use: No      Types: Cannabis         Current Outpatient Medications:   •  FLUOXETINE HCL 20 MG Oral Cap, TAKE 1 CAPSULE BY MOUTH EVERY DAY, Disp: 90 capsule, Rfl: 1  •  ATORVASTATIN 10 any significant weight gain, so while there is fatty liver we must also rule out other contributors.  She has changed diet and has already seen a nice drop in weight in couple weeks, so will see if liver tests are actually already improving.   - Check JUAN,

## 2019-12-31 RX ORDER — LOSARTAN POTASSIUM 100 MG/1
TABLET ORAL
Qty: 90 TABLET | Refills: 0 | Status: SHIPPED | OUTPATIENT
Start: 2019-12-31 | End: 2020-03-27

## 2019-12-31 RX ORDER — HYDROCHLOROTHIAZIDE 25 MG/1
TABLET ORAL
Qty: 90 TABLET | Refills: 0 | Status: SHIPPED | OUTPATIENT
Start: 2019-12-31 | End: 2020-03-27

## 2019-12-31 RX ORDER — LEVOTHYROXINE SODIUM 0.1 MG/1
TABLET ORAL
Qty: 90 TABLET | Refills: 0 | Status: SHIPPED | OUTPATIENT
Start: 2019-12-31 | End: 2020-03-27

## 2020-01-02 ENCOUNTER — HOSPITAL ENCOUNTER (OUTPATIENT)
Dept: MAMMOGRAPHY | Age: 60
Discharge: HOME | End: 2020-01-02
Attending: FAMILY MEDICINE
Payer: COMMERCIAL

## 2020-01-02 DIAGNOSIS — Z12.39 BREAST CANCER SCREENING: ICD-10-CM

## 2020-01-02 PROCEDURE — 77067 SCR MAMMO BI INCL CAD: CPT | Performed by: FAMILY MEDICINE

## 2020-01-02 PROCEDURE — 77063 BREAST TOMOSYNTHESIS BI: CPT | Performed by: FAMILY MEDICINE

## 2020-01-12 DIAGNOSIS — R79.89 ABNORMAL LIVER FUNCTION TESTS: Primary | ICD-10-CM

## 2020-01-30 ENCOUNTER — APPOINTMENT (OUTPATIENT)
Dept: LAB | Age: 60
End: 2020-01-30
Attending: FAMILY MEDICINE
Payer: COMMERCIAL

## 2020-01-30 ENCOUNTER — OFFICE VISIT (OUTPATIENT)
Dept: FAMILY MEDICINE CLINIC | Facility: CLINIC | Age: 60
End: 2020-01-30
Payer: COMMERCIAL

## 2020-01-30 VITALS
DIASTOLIC BLOOD PRESSURE: 76 MMHG | RESPIRATION RATE: 12 BRPM | BODY MASS INDEX: 26.19 KG/M2 | HEIGHT: 62.5 IN | HEART RATE: 64 BPM | WEIGHT: 146 LBS | TEMPERATURE: 98 F | SYSTOLIC BLOOD PRESSURE: 134 MMHG

## 2020-01-30 DIAGNOSIS — R73.02 IMPAIRED GLUCOSE TOLERANCE: Primary | ICD-10-CM

## 2020-01-30 DIAGNOSIS — R79.89 ELEVATED LIVER FUNCTION TESTS: ICD-10-CM

## 2020-01-30 DIAGNOSIS — Z12.4 PAP SMEAR FOR CERVICAL CANCER SCREENING: ICD-10-CM

## 2020-01-30 DIAGNOSIS — Z11.51 SCREENING FOR HPV (HUMAN PAPILLOMAVIRUS): ICD-10-CM

## 2020-01-30 DIAGNOSIS — E78.5 HYPERLIPIDEMIA, UNSPECIFIED HYPERLIPIDEMIA TYPE: ICD-10-CM

## 2020-01-30 DIAGNOSIS — Z00.00 ROUTINE GENERAL MEDICAL EXAMINATION AT A HEALTH CARE FACILITY: Primary | ICD-10-CM

## 2020-01-30 DIAGNOSIS — I10 HYPERTENSION, UNSPECIFIED TYPE: ICD-10-CM

## 2020-01-30 DIAGNOSIS — E74.39 GLUCOSE INTOLERANCE: ICD-10-CM

## 2020-01-30 PROBLEM — Z87.410 HISTORY OF CERVICAL DYSPLASIA: Status: ACTIVE | Noted: 2020-01-30

## 2020-01-30 LAB
ALBUMIN SERPL-MCNC: 3.6 G/DL (ref 3.4–5)
ALBUMIN/GLOB SERPL: 0.8 {RATIO} (ref 1–2)
ALP LIVER SERPL-CCNC: 66 U/L (ref 46–118)
ALT SERPL-CCNC: 30 U/L (ref 13–56)
ANION GAP SERPL CALC-SCNC: 4 MMOL/L (ref 0–18)
AST SERPL-CCNC: 20 U/L (ref 15–37)
BILIRUB SERPL-MCNC: 0.3 MG/DL (ref 0.1–2)
BUN BLD-MCNC: 27 MG/DL (ref 7–18)
BUN/CREAT SERPL: 25.5 (ref 10–20)
CALCIUM BLD-MCNC: 10.1 MG/DL (ref 8.5–10.1)
CHLORIDE SERPL-SCNC: 105 MMOL/L (ref 98–112)
CHOLEST SMN-MCNC: 141 MG/DL (ref ?–200)
CO2 SERPL-SCNC: 32 MMOL/L (ref 21–32)
CREAT BLD-MCNC: 1.06 MG/DL (ref 0.55–1.02)
EST. AVERAGE GLUCOSE BLD GHB EST-MCNC: 123 MG/DL (ref 68–126)
GLOBULIN PLAS-MCNC: 4.4 G/DL (ref 2.8–4.4)
GLUCOSE BLD-MCNC: 101 MG/DL (ref 70–99)
HBA1C MFR BLD HPLC: 5.9 % (ref ?–5.7)
HDLC SERPL-MCNC: 38 MG/DL (ref 40–59)
LDLC SERPL CALC-MCNC: 66 MG/DL (ref ?–100)
M PROTEIN MFR SERPL ELPH: 8 G/DL (ref 6.4–8.2)
NONHDLC SERPL-MCNC: 103 MG/DL (ref ?–130)
OSMOLALITY SERPL CALC.SUM OF ELEC: 297 MOSM/KG (ref 275–295)
PATIENT FASTING Y/N/NP: YES
PATIENT FASTING Y/N/NP: YES
POTASSIUM SERPL-SCNC: 3.7 MMOL/L (ref 3.5–5.1)
SODIUM SERPL-SCNC: 141 MMOL/L (ref 136–145)
TRIGL SERPL-MCNC: 185 MG/DL (ref 30–149)
VLDLC SERPL CALC-MCNC: 37 MG/DL (ref 0–30)

## 2020-01-30 PROCEDURE — 83036 HEMOGLOBIN GLYCOSYLATED A1C: CPT | Performed by: FAMILY MEDICINE

## 2020-01-30 PROCEDURE — 36415 COLL VENOUS BLD VENIPUNCTURE: CPT | Performed by: FAMILY MEDICINE

## 2020-01-30 PROCEDURE — 80053 COMPREHEN METABOLIC PANEL: CPT | Performed by: FAMILY MEDICINE

## 2020-01-30 PROCEDURE — 80061 LIPID PANEL: CPT | Performed by: FAMILY MEDICINE

## 2020-01-30 PROCEDURE — 87624 HPV HI-RISK TYP POOLED RSLT: CPT | Performed by: FAMILY MEDICINE

## 2020-01-30 PROCEDURE — 99396 PREV VISIT EST AGE 40-64: CPT | Performed by: FAMILY MEDICINE

## 2020-01-30 RX ORDER — TRAMADOL HYDROCHLORIDE 50 MG/1
TABLET ORAL
Refills: 0 | COMMUNITY
Start: 2019-11-29 | End: 2020-02-12 | Stop reason: ALTCHOICE

## 2020-01-30 NOTE — PROGRESS NOTES
CHIEF COMPLAINT       Annual Physical Exam  HPI:   Annabella Elizondo is a 60year old female who presents for a complete physical exam.   Has lost 15 pounds with diet and exercise. Seeing Dr. Faith Rivera for liver. Due to see in 6 months.   Appt with hematolog • OMEGA-3-ACID ETHYL ESTERS 1 g Oral Cap TAKE 4 CAPSULES (4 G TOTAL) BY MOUTH DAILY.  360 capsule 1   • Amitriptyline HCl 25 MG Oral Tab Take 1 tab po qhs 30 tablet 5   • Butalbital-APAP-Caffeine -40 MG Oral Tab TAKE 1 TABLET BY MOUTH TWICE DAILY AS or double vision  HEENT: no complaint of nasal congestion, sinus pain or ST  LUNGS: no complaint of shortness of breath with exertion  CARDIOVASCULAR: no complaint of chest pain on exertion  GI: no complaint of pain,denies heartburn  : No complains of dy

## 2020-02-04 LAB — HPV I/H RISK 1 DNA SPEC QL NAA+PROBE: NEGATIVE

## 2020-02-10 DIAGNOSIS — G43.001 MIGRAINE WITHOUT AURA AND WITH STATUS MIGRAINOSUS, NOT INTRACTABLE: ICD-10-CM

## 2020-02-10 RX ORDER — AMITRIPTYLINE HYDROCHLORIDE 25 MG/1
TABLET, FILM COATED ORAL
Qty: 90 TABLET | Refills: 2 | Status: SHIPPED | OUTPATIENT
Start: 2020-02-10 | End: 2020-10-29

## 2020-02-10 NOTE — TELEPHONE ENCOUNTER
Medication: Amitriptyline 25 mg    Date of last refill: 08/12/2019 with 5 addt refills    Last office visit: 09/11/2019  Due back to clinic per last office note:  RTN in 1 year by 09/11/2020  Date next office visit scheduled:        Last OV note recommenda

## 2020-02-12 ENCOUNTER — NURSE ONLY (OUTPATIENT)
Dept: HEMATOLOGY/ONCOLOGY | Facility: HOSPITAL | Age: 60
End: 2020-02-12
Attending: INTERNAL MEDICINE
Payer: COMMERCIAL

## 2020-02-12 ENCOUNTER — OFFICE VISIT (OUTPATIENT)
Dept: FAMILY MEDICINE CLINIC | Facility: CLINIC | Age: 60
End: 2020-02-12
Payer: COMMERCIAL

## 2020-02-12 VITALS
OXYGEN SATURATION: 99 % | HEART RATE: 86 BPM | TEMPERATURE: 100 F | SYSTOLIC BLOOD PRESSURE: 128 MMHG | DIASTOLIC BLOOD PRESSURE: 70 MMHG

## 2020-02-12 DIAGNOSIS — J11.1 INFLUENZA-LIKE ILLNESS: Primary | ICD-10-CM

## 2020-02-12 DIAGNOSIS — D47.2 MONOCLONAL GAMMOPATHY: ICD-10-CM

## 2020-02-12 LAB
ALBUMIN SERPL-MCNC: 3.7 G/DL (ref 3.4–5)
ALBUMIN/GLOB SERPL: 0.8 {RATIO} (ref 1–2)
ALP LIVER SERPL-CCNC: 83 U/L (ref 46–118)
ALT SERPL-CCNC: 31 U/L (ref 13–56)
ANION GAP SERPL CALC-SCNC: 2 MMOL/L (ref 0–18)
AST SERPL-CCNC: 24 U/L (ref 15–37)
BASOPHILS # BLD AUTO: 0.03 X10(3) UL (ref 0–0.2)
BASOPHILS NFR BLD AUTO: 0.6 %
BILIRUB SERPL-MCNC: 0.5 MG/DL (ref 0.1–2)
BUN BLD-MCNC: 21 MG/DL (ref 7–18)
BUN/CREAT SERPL: 19.8 (ref 10–20)
CALCIUM BLD-MCNC: 10 MG/DL (ref 8.5–10.1)
CHLORIDE SERPL-SCNC: 105 MMOL/L (ref 98–112)
CO2 SERPL-SCNC: 29 MMOL/L (ref 21–32)
CREAT BLD-MCNC: 1.06 MG/DL (ref 0.55–1.02)
DEPRECATED RDW RBC AUTO: 41.5 FL (ref 35.1–46.3)
EOSINOPHIL # BLD AUTO: 0.02 X10(3) UL (ref 0–0.7)
EOSINOPHIL NFR BLD AUTO: 0.4 %
ERYTHROCYTE [DISTWIDTH] IN BLOOD BY AUTOMATED COUNT: 11.9 % (ref 11–15)
GLOBULIN PLAS-MCNC: 4.8 G/DL (ref 2.8–4.4)
GLUCOSE BLD-MCNC: 104 MG/DL (ref 70–99)
HCT VFR BLD AUTO: 38.9 % (ref 35–48)
HGB BLD-MCNC: 12.9 G/DL (ref 12–16)
IGA SERPL-MCNC: 113 MG/DL (ref 70–312)
IGM SERPL-MCNC: 121 MG/DL (ref 43–279)
IMM GRANULOCYTES # BLD AUTO: 0 X10(3) UL (ref 0–1)
IMM GRANULOCYTES NFR BLD: 0 %
IMMUNOGLOBULIN PNL SER-MCNC: 1640 MG/DL (ref 791–1643)
LDH SERPL L TO P-CCNC: 160 U/L (ref 84–246)
LYMPHOCYTES # BLD AUTO: 0.86 X10(3) UL (ref 1–4)
LYMPHOCYTES NFR BLD AUTO: 18.6 %
M PROTEIN MFR SERPL ELPH: 8.5 G/DL (ref 6.4–8.2)
MCH RBC QN AUTO: 31 PG (ref 26–34)
MCHC RBC AUTO-ENTMCNC: 33.2 G/DL (ref 31–37)
MCV RBC AUTO: 93.5 FL (ref 80–100)
MONOCYTES # BLD AUTO: 0.67 X10(3) UL (ref 0.1–1)
MONOCYTES NFR BLD AUTO: 14.5 %
NEUTROPHILS # BLD AUTO: 3.05 X10 (3) UL (ref 1.5–7.7)
NEUTROPHILS # BLD AUTO: 3.05 X10(3) UL (ref 1.5–7.7)
NEUTROPHILS NFR BLD AUTO: 65.9 %
OSMOLALITY SERPL CALC.SUM OF ELEC: 285 MOSM/KG (ref 275–295)
PATIENT FASTING Y/N/NP: YES
PLATELET # BLD AUTO: 217 10(3)UL (ref 150–450)
POTASSIUM SERPL-SCNC: 3.2 MMOL/L (ref 3.5–5.1)
RBC # BLD AUTO: 4.16 X10(6)UL (ref 3.8–5.3)
SODIUM SERPL-SCNC: 136 MMOL/L (ref 136–145)
WBC # BLD AUTO: 4.6 X10(3) UL (ref 4–11)

## 2020-02-12 PROCEDURE — 84165 PROTEIN E-PHORESIS SERUM: CPT

## 2020-02-12 PROCEDURE — 86334 IMMUNOFIX E-PHORESIS SERUM: CPT

## 2020-02-12 PROCEDURE — 82784 ASSAY IGA/IGD/IGG/IGM EACH: CPT

## 2020-02-12 PROCEDURE — 80053 COMPREHEN METABOLIC PANEL: CPT

## 2020-02-12 PROCEDURE — 99213 OFFICE O/P EST LOW 20 MIN: CPT | Performed by: FAMILY MEDICINE

## 2020-02-12 PROCEDURE — 36415 COLL VENOUS BLD VENIPUNCTURE: CPT

## 2020-02-12 PROCEDURE — 83883 ASSAY NEPHELOMETRY NOT SPEC: CPT

## 2020-02-12 PROCEDURE — 83615 LACTATE (LD) (LDH) ENZYME: CPT

## 2020-02-12 PROCEDURE — 85025 COMPLETE CBC W/AUTO DIFF WBC: CPT

## 2020-02-12 NOTE — PATIENT INSTRUCTIONS
Take xofluza- 2 tabs once. Take with food. Use OTC meds for comfort as needed--  Ibuprofen/tylenol for fever/body aches.   Use Benadryl at bedtime to reduce drainage and promote rest.  Zyrtec/Claritin/Allegra in the AM to reduce nasal drainage without sed

## 2020-02-12 NOTE — PROGRESS NOTES
Patient presents with:  Flu: chills, body aches last night.  treated for flu 2 days ago.        SUBJECTIVE:   Lenard Tomlin is a 60year old female who presents complaining of flu-like symptoms of fever to 101, malaise, fatigue, chills, myalgias, Years: 28.00        Pack years: 29        Types: Cigarettes        Quit date: 2000        Years since quittin.4      Smokeless tobacco: Never Used    Alcohol use: No      Alcohol/week: 0.0 standard drinks    Drug use: No      Types: Cannabis -American 66 >=60    AST 24 15 - 37 U/L    ALT 31 13 - 56 U/L    Alkaline Phosphatase 83 46 - 118 U/L    Bilirubin, Total 0.5 0.1 - 2.0 mg/dL    Total Protein 8.5 (H) 6.4 - 8.2 g/dL    Albumin 3.7 3.4 - 5.0 g/dL    Globulin  4.8 (H) 2.8 - 4.4 g/dL Symptomatic/supportive therapy as detailed in AVS  2. Antiviral therapy: xofluza 20x2. Risks vs benefits of medications, as well as potential side effects, were reviewed.   3. Discussed possibility of secondary bacterial infections and when to seek care ba

## 2020-02-13 LAB
ALBUMIN SERPL ELPH-MCNC: 4.24 G/DL (ref 3.75–5.21)
ALBUMIN/GLOB SERPL: 1.1 {RATIO} (ref 1–2)
ALPHA1 GLOB SERPL ELPH-MCNC: 0.37 G/DL (ref 0.19–0.46)
ALPHA2 GLOB SERPL ELPH-MCNC: 0.95 G/DL (ref 0.48–1.05)
B-GLOBULIN SERPL ELPH-MCNC: 0.85 G/DL (ref 0.68–1.23)
GAMMA GLOB SERPL ELPH-MCNC: 1.69 G/DL (ref 0.62–1.7)
M PROTEIN MFR SERPL ELPH: 8.1 G/DL (ref 6.4–8.2)
M-SPIKE 1: 1.04 G/DL (ref ?–0)

## 2020-02-14 LAB
KAPPA FREE LIGHT CHAIN: 1.76 MG/DL (ref 0.33–1.94)
KAPPA/LAMBDA FLC RATIO: 1.28 (ref 0.26–1.65)
LAMBDA FREE LIGHT CHAIN: 1.37 MG/DL (ref 0.57–2.63)

## 2020-02-19 ENCOUNTER — OFFICE VISIT (OUTPATIENT)
Dept: HEMATOLOGY/ONCOLOGY | Facility: HOSPITAL | Age: 60
End: 2020-02-19
Attending: INTERNAL MEDICINE
Payer: COMMERCIAL

## 2020-02-19 VITALS
HEIGHT: 62.5 IN | OXYGEN SATURATION: 99 % | RESPIRATION RATE: 16 BRPM | BODY MASS INDEX: 25.57 KG/M2 | TEMPERATURE: 98 F | SYSTOLIC BLOOD PRESSURE: 134 MMHG | WEIGHT: 142.5 LBS | HEART RATE: 78 BPM | DIASTOLIC BLOOD PRESSURE: 79 MMHG

## 2020-02-19 DIAGNOSIS — D47.2 MONOCLONAL GAMMOPATHY: Primary | ICD-10-CM

## 2020-02-19 PROCEDURE — 99213 OFFICE O/P EST LOW 20 MIN: CPT | Performed by: INTERNAL MEDICINE

## 2020-02-19 NOTE — PROGRESS NOTES
Cancer Center Report of Consultation    Patient Name: Rajeev Musa   YOB: 1960   Medical Record Number: [id number]   CSN: [id number]   Consulting Physician: Gail Caceres MD  Referring Physician(s): No ref.  provider found  Date of Consult of children: 1      Years of education: Not on file      Highest education level: Not on file    Occupational History      Not on file    Social Needs      Financial resource strain: Not on file      Food insecurity:        Worry: Not on file        Inabil Social History Narrative      Not on file      Allergies:     Milk Thistle                Comment:Powd  Peanuts                     Comment:Derived Products    Current Medications:    Current Outpatient Medications:   •  Amitriptyline HCl 25 MG Oral Tab, and lymphadenopathy. Musculoskeletal: Negative for myalgias, arthralgias, muscle weakness. Genitourinary: Negative for dysuria or hematuria  Neurological: Negative for headaches, dizziness, speech problems, gait problems and focal weakness.   Psychiatric: 02/12/2020    ALB 3.7 02/12/2020    ALB 4.24 02/12/2020    TP 8.5 (H) 02/12/2020    TP 8.1 02/12/2020     Lab Component   Component Value Date/Time     02/12/2020 0836      Ref Range & Units 8/19/19 1451   Immunoglobulin A 70.00 - 312.00 mg/dL 115. 0 8/2019 was. 0.88. The M spike in 2/2020 was 1.04. I discussed that her m rotein is increasing but still low. There is no increase in light chains. Her CBC and CMP have no concerning findings. I discussed whether to repeat the bone marrow.  We will take peyton

## 2020-03-23 RX ORDER — OMEGA-3-ACID ETHYL ESTERS 1 G/1
4 CAPSULE, LIQUID FILLED ORAL DAILY
Qty: 360 CAPSULE | Refills: 1 | Status: SHIPPED | OUTPATIENT
Start: 2020-03-23 | End: 2020-09-17

## 2020-03-27 RX ORDER — LOSARTAN POTASSIUM 100 MG/1
TABLET ORAL
Qty: 90 TABLET | Refills: 0 | Status: SHIPPED | OUTPATIENT
Start: 2020-03-27 | End: 2020-06-19

## 2020-03-27 RX ORDER — LEVOTHYROXINE SODIUM 0.1 MG/1
TABLET ORAL
Qty: 90 TABLET | Refills: 0 | Status: SHIPPED | OUTPATIENT
Start: 2020-03-27 | End: 2020-06-19

## 2020-03-27 RX ORDER — HYDROCHLOROTHIAZIDE 25 MG/1
TABLET ORAL
Qty: 90 TABLET | Refills: 0 | Status: SHIPPED | OUTPATIENT
Start: 2020-03-27 | End: 2020-06-19

## 2020-03-30 DIAGNOSIS — G43.909 MIGRAINE WITHOUT STATUS MIGRAINOSUS, NOT INTRACTABLE, UNSPECIFIED MIGRAINE TYPE: ICD-10-CM

## 2020-03-30 RX ORDER — BUTALBITAL, ACETAMINOPHEN AND CAFFEINE 50; 325; 40 MG/1; MG/1; MG/1
TABLET ORAL
Qty: 60 TABLET | Refills: 2 | Status: SHIPPED | OUTPATIENT
Start: 2020-03-30 | End: 2020-11-05

## 2020-03-30 NOTE — TELEPHONE ENCOUNTER
Medication: Butalbital-APAP-Caffeine -40 mg     Date of last refill: 2/11/019 (#60/5)  [place]    Last office visit: 9/11/2019  Due back to clinic per last office note:  1 year  Date next office visit scheduled

## 2020-05-04 RX ORDER — OMEPRAZOLE 40 MG/1
CAPSULE, DELAYED RELEASE ORAL
Qty: 90 CAPSULE | Refills: 0 | Status: SHIPPED | OUTPATIENT
Start: 2020-05-04 | End: 2020-08-24

## 2020-05-04 RX ORDER — ATORVASTATIN CALCIUM 10 MG/1
TABLET, FILM COATED ORAL
Qty: 90 TABLET | Refills: 0 | Status: SHIPPED | OUTPATIENT
Start: 2020-05-04 | End: 2020-08-04

## 2020-05-04 RX ORDER — FLUOXETINE HYDROCHLORIDE 20 MG/1
CAPSULE ORAL
Qty: 90 CAPSULE | Refills: 0 | Status: SHIPPED | OUTPATIENT
Start: 2020-05-04 | End: 2020-08-24

## 2020-06-19 RX ORDER — LEVOTHYROXINE SODIUM 0.1 MG/1
TABLET ORAL
Qty: 90 TABLET | Refills: 0 | Status: SHIPPED | OUTPATIENT
Start: 2020-06-19 | End: 2020-08-25

## 2020-06-19 RX ORDER — HYDROCHLOROTHIAZIDE 25 MG/1
TABLET ORAL
Qty: 90 TABLET | Refills: 0 | Status: SHIPPED | OUTPATIENT
Start: 2020-06-19 | End: 2020-08-25

## 2020-06-19 RX ORDER — LOSARTAN POTASSIUM 100 MG/1
TABLET ORAL
Qty: 90 TABLET | Refills: 0 | Status: SHIPPED | OUTPATIENT
Start: 2020-06-19 | End: 2020-08-25

## 2020-07-06 ENCOUNTER — OFFICE VISIT (OUTPATIENT)
Dept: SURGERY | Facility: CLINIC | Age: 60
End: 2020-07-06
Payer: COMMERCIAL

## 2020-07-06 DIAGNOSIS — R74.8 ELEVATED LIVER ENZYMES: Primary | ICD-10-CM

## 2020-07-08 NOTE — PROGRESS NOTES
[facility]  [address], [address]  [address]  [phone] ([phone]) • Cancer Maternal Grandmother         Colon   • Stroke Sister        Current Outpatient Medications   Medication Sig Dispense Refill   • LOSARTAN 100 MG Oral Tab TAKE 1 TABLET BY MOUTH EVERY DAY 90 tablet 0   • LEVOTHYROXINE SODIUM 100 MCG Oral Tab TAKE Most recent (Feb '20) LFTs have returned to normal; continue to monitor; labs scheduled in Aug (patient to call to notify); repeat in Jan '21.   - She has changed diet and has seen > 10% drop in weight; Encouraged healthy eating and to maintain existing we

## 2020-07-30 NOTE — TELEPHONE ENCOUNTER
Please call pt for follow up HTN, Depressionand Thyroid with Bobbi Mcintosh prior to refill. Thanks.

## 2020-08-04 RX ORDER — ATORVASTATIN CALCIUM 10 MG/1
TABLET, FILM COATED ORAL
Qty: 90 TABLET | Refills: 0 | Status: SHIPPED | OUTPATIENT
Start: 2020-08-04 | End: 2020-08-25

## 2020-08-12 ENCOUNTER — NURSE ONLY (OUTPATIENT)
Dept: HEMATOLOGY/ONCOLOGY | Facility: HOSPITAL | Age: 60
End: 2020-08-12
Attending: INTERNAL MEDICINE
Payer: COMMERCIAL

## 2020-08-12 DIAGNOSIS — D47.2 MONOCLONAL GAMMOPATHY: ICD-10-CM

## 2020-08-12 LAB
ALBUMIN SERPL-MCNC: 3.8 G/DL (ref 3.4–5)
ALBUMIN/GLOB SERPL: 0.9 {RATIO} (ref 1–2)
ALP LIVER SERPL-CCNC: 81 U/L (ref 50–130)
ALT SERPL-CCNC: 26 U/L (ref 13–56)
ANION GAP SERPL CALC-SCNC: 1 MMOL/L (ref 0–18)
AST SERPL-CCNC: 21 U/L (ref 15–37)
BASOPHILS # BLD AUTO: 0.04 X10(3) UL (ref 0–0.2)
BASOPHILS NFR BLD AUTO: 0.7 %
BILIRUB SERPL-MCNC: 0.4 MG/DL (ref 0.1–2)
BUN BLD-MCNC: 28 MG/DL (ref 7–18)
BUN/CREAT SERPL: 18.2 (ref 10–20)
CALCIUM BLD-MCNC: 10.1 MG/DL (ref 8.5–10.1)
CHLORIDE SERPL-SCNC: 104 MMOL/L (ref 98–112)
CO2 SERPL-SCNC: 34 MMOL/L (ref 21–32)
CREAT BLD-MCNC: 1.54 MG/DL (ref 0.55–1.02)
DEPRECATED RDW RBC AUTO: 40.9 FL (ref 35.1–46.3)
EOSINOPHIL # BLD AUTO: 0.08 X10(3) UL (ref 0–0.7)
EOSINOPHIL NFR BLD AUTO: 1.3 %
ERYTHROCYTE [DISTWIDTH] IN BLOOD BY AUTOMATED COUNT: 11.8 % (ref 11–15)
GLOBULIN PLAS-MCNC: 4.2 G/DL (ref 2.8–4.4)
GLUCOSE BLD-MCNC: 107 MG/DL (ref 70–99)
HCT VFR BLD AUTO: 39.3 % (ref 35–48)
HGB BLD-MCNC: 12.7 G/DL (ref 12–16)
IGA SERPL-MCNC: 99.3 MG/DL (ref 70–312)
IGM SERPL-MCNC: 108 MG/DL (ref 43–279)
IMM GRANULOCYTES # BLD AUTO: 0.01 X10(3) UL (ref 0–1)
IMM GRANULOCYTES NFR BLD: 0.2 %
IMMUNOGLOBULIN PNL SER-MCNC: 1590 MG/DL (ref 791–1643)
LDH SERPL L TO P-CCNC: 184 U/L
LYMPHOCYTES # BLD AUTO: 2.25 X10(3) UL (ref 1–4)
LYMPHOCYTES NFR BLD AUTO: 37.8 %
M PROTEIN MFR SERPL ELPH: 8 G/DL (ref 6.4–8.2)
MCH RBC QN AUTO: 30.8 PG (ref 26–34)
MCHC RBC AUTO-ENTMCNC: 32.3 G/DL (ref 31–37)
MCV RBC AUTO: 95.4 FL (ref 80–100)
MONOCYTES # BLD AUTO: 0.36 X10(3) UL (ref 0.1–1)
MONOCYTES NFR BLD AUTO: 6 %
NEUTROPHILS # BLD AUTO: 3.22 X10 (3) UL (ref 1.5–7.7)
NEUTROPHILS # BLD AUTO: 3.22 X10(3) UL (ref 1.5–7.7)
NEUTROPHILS NFR BLD AUTO: 54 %
OSMOLALITY SERPL CALC.SUM OF ELEC: 294 MOSM/KG (ref 275–295)
PATIENT FASTING Y/N/NP: NO
PLATELET # BLD AUTO: 219 10(3)UL (ref 150–450)
POTASSIUM SERPL-SCNC: 3.5 MMOL/L (ref 3.5–5.1)
RBC # BLD AUTO: 4.12 X10(6)UL (ref 3.8–5.3)
SODIUM SERPL-SCNC: 139 MMOL/L (ref 136–145)
WBC # BLD AUTO: 6 X10(3) UL (ref 4–11)

## 2020-08-12 PROCEDURE — 36415 COLL VENOUS BLD VENIPUNCTURE: CPT

## 2020-08-12 PROCEDURE — 84165 PROTEIN E-PHORESIS SERUM: CPT

## 2020-08-12 PROCEDURE — 83615 LACTATE (LD) (LDH) ENZYME: CPT

## 2020-08-12 PROCEDURE — 83883 ASSAY NEPHELOMETRY NOT SPEC: CPT

## 2020-08-12 PROCEDURE — 82784 ASSAY IGA/IGD/IGG/IGM EACH: CPT

## 2020-08-12 PROCEDURE — 80053 COMPREHEN METABOLIC PANEL: CPT

## 2020-08-12 PROCEDURE — 85025 COMPLETE CBC W/AUTO DIFF WBC: CPT

## 2020-08-12 PROCEDURE — 86334 IMMUNOFIX E-PHORESIS SERUM: CPT

## 2020-08-13 LAB
KAPPA FREE LIGHT CHAIN: 1.55 MG/DL (ref 0.33–1.94)
KAPPA/LAMBDA FLC RATIO: 1.58 (ref 0.26–1.65)
LAMBDA FREE LIGHT CHAIN: 0.98 MG/DL (ref 0.57–2.63)

## 2020-08-18 LAB
ALBUMIN SERPL ELPH-MCNC: 4.25 G/DL (ref 3.75–5.21)
ALBUMIN/GLOB SERPL: 1.23 {RATIO} (ref 1–2)
ALPHA1 GLOB SERPL ELPH-MCNC: 0.29 G/DL (ref 0.19–0.46)
ALPHA2 GLOB SERPL ELPH-MCNC: 0.8 G/DL (ref 0.48–1.05)
B-GLOBULIN SERPL ELPH-MCNC: 0.75 G/DL (ref 0.68–1.23)
GAMMA GLOB SERPL ELPH-MCNC: 1.6 G/DL (ref 0.62–1.7)
M PROTEIN MFR SERPL ELPH: 7.7 G/DL (ref 6.4–8.2)
M-SPIKE 1: 0.92 G/DL (ref ?–0)

## 2020-08-19 ENCOUNTER — OFFICE VISIT (OUTPATIENT)
Dept: HEMATOLOGY/ONCOLOGY | Facility: HOSPITAL | Age: 60
End: 2020-08-19
Attending: INTERNAL MEDICINE
Payer: COMMERCIAL

## 2020-08-19 VITALS
OXYGEN SATURATION: 99 % | HEIGHT: 62.5 IN | BODY MASS INDEX: 24.76 KG/M2 | WEIGHT: 138 LBS | SYSTOLIC BLOOD PRESSURE: 149 MMHG | HEART RATE: 65 BPM | RESPIRATION RATE: 16 BRPM | TEMPERATURE: 98 F | DIASTOLIC BLOOD PRESSURE: 77 MMHG

## 2020-08-19 DIAGNOSIS — D47.2 MONOCLONAL GAMMOPATHY: Primary | ICD-10-CM

## 2020-08-19 PROCEDURE — 99213 OFFICE O/P EST LOW 20 MIN: CPT | Performed by: INTERNAL MEDICINE

## 2020-08-19 NOTE — PROGRESS NOTES
Cancer Center Progress Note    Problem List:      Patient Active Problem List:     Symptomatic menopausal or female climacteric states     Depressive disorder, not elsewhere classified     Allergy, unspecified not elsewhere classified     Other and unspeci cells comprising less  than 5% with .  CD3 highlights the T-lymphocytes which predominate,  while CD20 highlights a few, scattered small B-lymphocytes.     Review of Systems:   Constitutional: Negative for anorexia, fatigue, fevers, chills, night sweat Comment:Derived Products    Medications:  No outpatient medications have been marked as taking for the 8/19/20 encounter (Appointment) with Sandra Patten MD.        Vital Signs:      Height: 158.8 cm (5' 2.5\") (08/19 0805)  Weight: 62.6 kg (138 lb) 08/12/2020    ALB 3.8 08/12/2020    ALB 4.25 08/12/2020    TP 8.0 08/12/2020    TP 7.7 08/12/2020     Lab Component   Component Value Date/Time     08/12/2020 0801     Component  Ref Range & Units 8/12/20 0801   Total Protein  6.4 - 8.2 g/dL 7.7 with repeat cbc, cmp, ldh, and monoclonal protein study.       Conchita Frances MD

## 2020-08-24 RX ORDER — OMEPRAZOLE 40 MG/1
CAPSULE, DELAYED RELEASE ORAL
Qty: 90 CAPSULE | Refills: 0 | Status: SHIPPED | OUTPATIENT
Start: 2020-08-24 | End: 2020-08-25

## 2020-08-24 RX ORDER — FLUOXETINE HYDROCHLORIDE 20 MG/1
CAPSULE ORAL
Qty: 90 CAPSULE | Refills: 0 | Status: SHIPPED | OUTPATIENT
Start: 2020-08-24 | End: 2020-08-25

## 2020-08-25 ENCOUNTER — OFFICE VISIT (OUTPATIENT)
Dept: FAMILY MEDICINE CLINIC | Facility: CLINIC | Age: 60
End: 2020-08-25
Payer: COMMERCIAL

## 2020-08-25 VITALS
WEIGHT: 139 LBS | HEART RATE: 64 BPM | HEIGHT: 62.5 IN | TEMPERATURE: 97 F | SYSTOLIC BLOOD PRESSURE: 112 MMHG | RESPIRATION RATE: 18 BRPM | DIASTOLIC BLOOD PRESSURE: 68 MMHG | BODY MASS INDEX: 24.94 KG/M2

## 2020-08-25 DIAGNOSIS — E03.9 HYPOTHYROIDISM, UNSPECIFIED TYPE: ICD-10-CM

## 2020-08-25 DIAGNOSIS — D47.2 MONOCLONAL GAMMOPATHY: ICD-10-CM

## 2020-08-25 DIAGNOSIS — E74.39 GLUCOSE INTOLERANCE: ICD-10-CM

## 2020-08-25 DIAGNOSIS — F32.A DEPRESSION, UNSPECIFIED DEPRESSION TYPE: ICD-10-CM

## 2020-08-25 DIAGNOSIS — Z12.12 SCREENING FOR COLORECTAL CANCER: ICD-10-CM

## 2020-08-25 DIAGNOSIS — Z12.11 SCREENING FOR COLORECTAL CANCER: ICD-10-CM

## 2020-08-25 DIAGNOSIS — R73.09 ELEVATED GLUCOSE: ICD-10-CM

## 2020-08-25 DIAGNOSIS — I10 BENIGN ESSENTIAL HYPERTENSION: Primary | ICD-10-CM

## 2020-08-25 DIAGNOSIS — E78.5 HYPERLIPIDEMIA, UNSPECIFIED HYPERLIPIDEMIA TYPE: ICD-10-CM

## 2020-08-25 PROCEDURE — 3074F SYST BP LT 130 MM HG: CPT | Performed by: FAMILY MEDICINE

## 2020-08-25 PROCEDURE — 99214 OFFICE O/P EST MOD 30 MIN: CPT | Performed by: FAMILY MEDICINE

## 2020-08-25 PROCEDURE — 3078F DIAST BP <80 MM HG: CPT | Performed by: FAMILY MEDICINE

## 2020-08-25 PROCEDURE — 3008F BODY MASS INDEX DOCD: CPT | Performed by: FAMILY MEDICINE

## 2020-08-25 RX ORDER — OMEPRAZOLE 40 MG/1
40 CAPSULE, DELAYED RELEASE ORAL DAILY
Qty: 90 CAPSULE | Refills: 1 | Status: SHIPPED | OUTPATIENT
Start: 2020-08-25 | End: 2021-04-30

## 2020-08-25 RX ORDER — LEVOTHYROXINE SODIUM 0.1 MG/1
100 TABLET ORAL DAILY
Qty: 90 TABLET | Refills: 1 | Status: SHIPPED | OUTPATIENT
Start: 2020-08-25 | End: 2021-02-23

## 2020-08-25 RX ORDER — FLUOXETINE HYDROCHLORIDE 20 MG/1
20 CAPSULE ORAL DAILY
Qty: 90 CAPSULE | Refills: 1 | Status: SHIPPED | OUTPATIENT
Start: 2020-08-25 | End: 2021-04-30

## 2020-08-25 RX ORDER — LOSARTAN POTASSIUM 100 MG/1
100 TABLET ORAL DAILY
Qty: 90 TABLET | Refills: 1 | Status: SHIPPED | OUTPATIENT
Start: 2020-08-25 | End: 2021-02-23

## 2020-08-25 RX ORDER — HYDROCHLOROTHIAZIDE 25 MG/1
25 TABLET ORAL DAILY
Qty: 90 TABLET | Refills: 1 | Status: SHIPPED | OUTPATIENT
Start: 2020-08-25 | End: 2021-02-23

## 2020-08-25 RX ORDER — ATORVASTATIN CALCIUM 10 MG/1
10 TABLET, FILM COATED ORAL DAILY
Qty: 90 TABLET | Refills: 1 | Status: SHIPPED | OUTPATIENT
Start: 2020-08-25 | End: 2021-04-05

## 2020-08-25 NOTE — PATIENT INSTRUCTIONS
Check fasting lipid panel, CMP, and A1C (looks at blood sugar over 3 months). Call or go online to make an appointment for the lab.

## 2020-08-25 NOTE — PROGRESS NOTES
CHIEF COMPLAINT:   Lenard Tomlin a 63year old female is here for followup on HTN  HPI:   Lenard Tomlin has been doing well since the last visit here. Lenard Tomlin  is compliant with hypertensive medication. No chest pain. No dyspnea.  No palpit Visit:  Requested Prescriptions     Pending Prescriptions Disp Refills   • FLUoxetine HCl 20 MG Oral Cap 90 capsule 1     Sig: Take 1 capsule (20 mg total) by mouth daily.    • atorvastatin 10 MG Oral Tab 90 tablet 1     Sig: Take 1 tablet (10 mg total) by

## 2020-09-17 RX ORDER — OMEGA-3-ACID ETHYL ESTERS 1 G/1
4 CAPSULE, LIQUID FILLED ORAL DAILY
Qty: 360 CAPSULE | Refills: 1 | Status: SHIPPED | OUTPATIENT
Start: 2020-09-17 | End: 2021-04-05

## 2020-10-01 ENCOUNTER — TELEPHONE (OUTPATIENT)
Dept: FAMILY MEDICINE CLINIC | Facility: CLINIC | Age: 60
End: 2020-10-01

## 2020-10-01 NOTE — TELEPHONE ENCOUNTER
Pt liver doctor wants her to have HEB b vaccine because she has hx of hep a. appt 10/7/20 thank you.

## 2020-10-07 ENCOUNTER — NURSE ONLY (OUTPATIENT)
Dept: FAMILY MEDICINE CLINIC | Facility: CLINIC | Age: 60
End: 2020-10-07
Payer: COMMERCIAL

## 2020-10-07 VITALS — TEMPERATURE: 97 F

## 2020-10-07 DIAGNOSIS — Z23 NEED FOR VACCINATION: ICD-10-CM

## 2020-10-07 PROCEDURE — 90746 HEPB VACCINE 3 DOSE ADULT IM: CPT | Performed by: FAMILY MEDICINE

## 2020-10-07 PROCEDURE — 90471 IMMUNIZATION ADMIN: CPT | Performed by: FAMILY MEDICINE

## 2020-10-07 PROCEDURE — 90686 IIV4 VACC NO PRSV 0.5 ML IM: CPT | Performed by: FAMILY MEDICINE

## 2020-10-07 PROCEDURE — 90472 IMMUNIZATION ADMIN EACH ADD: CPT | Performed by: FAMILY MEDICINE

## 2020-10-07 NOTE — PROGRESS NOTES
Pt presents for flu vaccine and Hep B #1. Pt reports other than sore arm, no side effects or adverse reax w any previous vaccine administrations. Reinforced poss vaccine side effects and conservative management measures, if needed.    Advised if any sever

## 2020-10-08 ENCOUNTER — LAB ENCOUNTER (OUTPATIENT)
Dept: LAB | Age: 60
End: 2020-10-08
Attending: FAMILY MEDICINE
Payer: COMMERCIAL

## 2020-10-08 DIAGNOSIS — E78.5 HYPERLIPIDEMIA, UNSPECIFIED HYPERLIPIDEMIA TYPE: ICD-10-CM

## 2020-10-08 DIAGNOSIS — I10 BENIGN ESSENTIAL HYPERTENSION: ICD-10-CM

## 2020-10-08 DIAGNOSIS — R73.09 ELEVATED GLUCOSE: ICD-10-CM

## 2020-10-08 DIAGNOSIS — E87.6 HYPOKALEMIA: Primary | ICD-10-CM

## 2020-10-08 DIAGNOSIS — E03.9 HYPOTHYROIDISM, UNSPECIFIED TYPE: ICD-10-CM

## 2020-10-08 PROCEDURE — 83036 HEMOGLOBIN GLYCOSYLATED A1C: CPT | Performed by: FAMILY MEDICINE

## 2020-10-08 PROCEDURE — 84439 ASSAY OF FREE THYROXINE: CPT | Performed by: FAMILY MEDICINE

## 2020-10-08 PROCEDURE — 36415 COLL VENOUS BLD VENIPUNCTURE: CPT | Performed by: FAMILY MEDICINE

## 2020-10-08 PROCEDURE — 80061 LIPID PANEL: CPT | Performed by: FAMILY MEDICINE

## 2020-10-08 PROCEDURE — 80050 GENERAL HEALTH PANEL: CPT | Performed by: FAMILY MEDICINE

## 2020-10-29 DIAGNOSIS — G43.001 MIGRAINE WITHOUT AURA AND WITH STATUS MIGRAINOSUS, NOT INTRACTABLE: ICD-10-CM

## 2020-10-29 RX ORDER — AMITRIPTYLINE HYDROCHLORIDE 25 MG/1
TABLET, FILM COATED ORAL
Qty: 30 TABLET | Refills: 0 | Status: SHIPPED | OUTPATIENT
Start: 2020-10-29 | End: 2020-11-05

## 2020-10-29 NOTE — TELEPHONE ENCOUNTER
Medication:Amitriptyline 25 mg     Date of last refill: 2/10/20 (#60/2)  [place]     Last office visit: 9/11/2019  Due back to clinic per last office note:  1 year  Date next office visit scheduled:                La

## 2020-10-30 ENCOUNTER — TELEPHONE (OUTPATIENT)
Dept: FAMILY MEDICINE CLINIC | Facility: CLINIC | Age: 60
End: 2020-10-30

## 2020-10-30 NOTE — TELEPHONE ENCOUNTER
S/w pt. She is not requesting mammo prior. She was just letting us know she will need one at time of her px.

## 2020-10-30 NOTE — TELEPHONE ENCOUNTER
My chart msg received:    Appointment Request From: Sharon Lieberman      With Provider: Nicole Blizzard, MD [-Primary Care Physician-]      Preferred Date Range: From 10/29/2020 To 1/2/2021      Preferred Times: Any      Reason:  To address the following

## 2020-11-05 ENCOUNTER — OFFICE VISIT (OUTPATIENT)
Dept: NEUROLOGY | Facility: CLINIC | Age: 60
End: 2020-11-05
Payer: COMMERCIAL

## 2020-11-05 VITALS
SYSTOLIC BLOOD PRESSURE: 120 MMHG | RESPIRATION RATE: 16 BRPM | HEART RATE: 74 BPM | HEIGHT: 62.5 IN | DIASTOLIC BLOOD PRESSURE: 77 MMHG | BODY MASS INDEX: 24.94 KG/M2 | WEIGHT: 139 LBS

## 2020-11-05 DIAGNOSIS — G43.001 MIGRAINE WITHOUT AURA AND WITH STATUS MIGRAINOSUS, NOT INTRACTABLE: Primary | ICD-10-CM

## 2020-11-05 PROCEDURE — 3078F DIAST BP <80 MM HG: CPT | Performed by: PHYSICIAN ASSISTANT

## 2020-11-05 PROCEDURE — 3074F SYST BP LT 130 MM HG: CPT | Performed by: PHYSICIAN ASSISTANT

## 2020-11-05 PROCEDURE — 3008F BODY MASS INDEX DOCD: CPT | Performed by: PHYSICIAN ASSISTANT

## 2020-11-05 PROCEDURE — 99072 ADDL SUPL MATRL&STAF TM PHE: CPT | Performed by: PHYSICIAN ASSISTANT

## 2020-11-05 PROCEDURE — 99213 OFFICE O/P EST LOW 20 MIN: CPT | Performed by: PHYSICIAN ASSISTANT

## 2020-11-05 RX ORDER — BUTALBITAL, ACETAMINOPHEN AND CAFFEINE 50; 325; 40 MG/1; MG/1; MG/1
TABLET ORAL
Qty: 60 TABLET | Refills: 1 | Status: SHIPPED | OUTPATIENT
Start: 2020-11-05 | End: 2021-12-01

## 2020-11-05 RX ORDER — AMITRIPTYLINE HYDROCHLORIDE 25 MG/1
25 TABLET, FILM COATED ORAL NIGHTLY
Qty: 90 TABLET | Refills: 3 | Status: SHIPPED | OUTPATIENT
Start: 2020-11-05 | End: 2021-11-22

## 2020-11-05 NOTE — PROGRESS NOTES
[facility] with [address]  3/16/1960  Primary Care Provider:  Addison Aguillon MD    11/5/2020  Accompanied visit: No      63year old female patient being seen for: Migraine by mouth daily. , Disp: 90 capsule, Rfl: 1  •  atorvastatin 10 MG Oral Tab, Take 1 tablet (10 mg total) by mouth daily. , Disp: 90 tablet, Rfl: 1  •  losartan 100 MG Oral Tab, Take 1 tablet (100 mg total) by mouth daily. , Disp: 90 tablet, Rfl: 1  •  hydrochl of any treatment relevant to above. Includes explanation of tests as necessary. Return in about 1 year (around 11/5/2021).       Patient understands that if needed, based on condition and or test results, follow up will be readjusted      Sandra Harman,

## 2020-11-09 ENCOUNTER — NURSE ONLY (OUTPATIENT)
Dept: FAMILY MEDICINE CLINIC | Facility: CLINIC | Age: 60
End: 2020-11-09
Payer: COMMERCIAL

## 2020-11-09 DIAGNOSIS — Z23 NEED FOR VACCINATION: Primary | ICD-10-CM

## 2020-11-09 PROCEDURE — 90746 HEPB VACCINE 3 DOSE ADULT IM: CPT | Performed by: FAMILY MEDICINE

## 2020-11-09 PROCEDURE — 90471 IMMUNIZATION ADMIN: CPT | Performed by: FAMILY MEDICINE

## 2020-12-22 ENCOUNTER — HOSPITAL ENCOUNTER (OUTPATIENT)
Dept: ULTRASOUND IMAGING | Age: 60
Discharge: HOME | End: 2020-12-22
Attending: INTERNAL MEDICINE
Payer: COMMERCIAL

## 2020-12-22 ENCOUNTER — LAB ENCOUNTER (OUTPATIENT)
Dept: LAB | Age: 60
End: 2020-12-22
Attending: INTERNAL MEDICINE
Payer: COMMERCIAL

## 2020-12-22 DIAGNOSIS — R74.8 ELEVATED LIVER ENZYMES: ICD-10-CM

## 2020-12-22 DIAGNOSIS — R79.89 ABNORMAL LIVER FUNCTION TESTS: ICD-10-CM

## 2020-12-22 DIAGNOSIS — E87.6 HYPOKALEMIA: ICD-10-CM

## 2020-12-22 DIAGNOSIS — E78.5 HYPERLIPIDEMIA, UNSPECIFIED HYPERLIPIDEMIA TYPE: ICD-10-CM

## 2020-12-22 DIAGNOSIS — R73.02 IMPAIRED GLUCOSE TOLERANCE: ICD-10-CM

## 2020-12-22 DIAGNOSIS — I10 HYPERTENSION, UNSPECIFIED TYPE: ICD-10-CM

## 2020-12-22 PROCEDURE — 82248 BILIRUBIN DIRECT: CPT

## 2020-12-22 PROCEDURE — 36415 COLL VENOUS BLD VENIPUNCTURE: CPT

## 2020-12-22 PROCEDURE — 76705 ECHO EXAM OF ABDOMEN: CPT | Performed by: INTERNAL MEDICINE

## 2020-12-22 PROCEDURE — 80061 LIPID PANEL: CPT

## 2020-12-22 PROCEDURE — 76981 USE PARENCHYMA: CPT | Performed by: INTERNAL MEDICINE

## 2020-12-22 PROCEDURE — 83036 HEMOGLOBIN GLYCOSYLATED A1C: CPT

## 2020-12-22 PROCEDURE — 80053 COMPREHEN METABOLIC PANEL: CPT

## 2021-01-04 ENCOUNTER — TELEPHONE (OUTPATIENT)
Dept: FAMILY MEDICINE CLINIC | Facility: CLINIC | Age: 61
End: 2021-01-04

## 2021-01-04 DIAGNOSIS — Z23 NEED FOR HEPATITIS B VACCINATION: Primary | ICD-10-CM

## 2021-02-11 ENCOUNTER — OFFICE VISIT (OUTPATIENT)
Dept: FAMILY MEDICINE CLINIC | Facility: CLINIC | Age: 61
End: 2021-02-11
Payer: COMMERCIAL

## 2021-02-11 VITALS
DIASTOLIC BLOOD PRESSURE: 70 MMHG | HEART RATE: 72 BPM | SYSTOLIC BLOOD PRESSURE: 114 MMHG | RESPIRATION RATE: 12 BRPM | HEIGHT: 62.5 IN | WEIGHT: 139 LBS | BODY MASS INDEX: 24.94 KG/M2

## 2021-02-11 DIAGNOSIS — Z00.00 ROUTINE GENERAL MEDICAL EXAMINATION AT A HEALTH CARE FACILITY: Primary | ICD-10-CM

## 2021-02-11 DIAGNOSIS — Z13.820 SCREENING FOR OSTEOPOROSIS: ICD-10-CM

## 2021-02-11 DIAGNOSIS — Z12.31 ENCOUNTER FOR SCREENING MAMMOGRAM FOR MALIGNANT NEOPLASM OF BREAST: ICD-10-CM

## 2021-02-11 DIAGNOSIS — Z12.4 PAP SMEAR FOR CERVICAL CANCER SCREENING: ICD-10-CM

## 2021-02-11 DIAGNOSIS — Z13.89 SCREENING FOR GENITOURINARY CONDITION: ICD-10-CM

## 2021-02-11 DIAGNOSIS — Z12.31 ENCOUNTER FOR SCREENING MAMMOGRAM FOR BREAST CANCER: ICD-10-CM

## 2021-02-11 DIAGNOSIS — Z12.11 SCREENING FOR COLORECTAL CANCER: ICD-10-CM

## 2021-02-11 DIAGNOSIS — E03.9 HYPOTHYROIDISM, UNSPECIFIED TYPE: ICD-10-CM

## 2021-02-11 DIAGNOSIS — Z12.12 SCREENING FOR COLORECTAL CANCER: ICD-10-CM

## 2021-02-11 DIAGNOSIS — Z11.51 SCREENING FOR HPV (HUMAN PAPILLOMAVIRUS): ICD-10-CM

## 2021-02-11 DIAGNOSIS — Z00.00 BLOOD TESTS FOR ROUTINE GENERAL PHYSICAL EXAMINATION: ICD-10-CM

## 2021-02-11 PROCEDURE — 87624 HPV HI-RISK TYP POOLED RSLT: CPT | Performed by: FAMILY MEDICINE

## 2021-02-11 PROCEDURE — 3008F BODY MASS INDEX DOCD: CPT | Performed by: FAMILY MEDICINE

## 2021-02-11 PROCEDURE — 3078F DIAST BP <80 MM HG: CPT | Performed by: FAMILY MEDICINE

## 2021-02-11 PROCEDURE — 3074F SYST BP LT 130 MM HG: CPT | Performed by: FAMILY MEDICINE

## 2021-02-11 PROCEDURE — 99396 PREV VISIT EST AGE 40-64: CPT | Performed by: FAMILY MEDICINE

## 2021-02-11 NOTE — PATIENT INSTRUCTIONS
Download the [address]. This will allow you to go into your My Chart. Once you are in My Chart, click on Menu and then scroll until you see the questionnaire section. Complete the employment questionnaire. Thank you for reaching out.  Please

## 2021-02-11 NOTE — PROGRESS NOTES
CHIEF COMPLAINT       Annual Physical Exam  HPI:   Gena Orr is a 63year old female who presents for a complete physical exam.   Hx of cervical dysplasia. Pap last year. Last Dexa 2017.   Wt Readings from Last 6 Encounters:  02/11/21 : 139 lb (63 k tablet (25 mg total) by mouth daily. 90 tablet 1   • Levothyroxine Sodium 100 MCG Oral Tab Take 1 tablet (100 mcg total) by mouth daily. 90 tablet 1   • Omeprazole 40 MG Oral Capsule Delayed Release Take 1 capsule (40 mg total) by mouth daily.  90 capsule 1 exertion  GI: no complaint of pain,denies heartburn  : No complains of dysuria or vaginal discharge  MUSCULOSKELETAL: no complaint of back pain  NEURO: no complaint of headaches  PSYCHE: no complaint ofdepression or anxiety    EXAM:   /70   Pulse 7

## 2021-02-16 LAB — HPV I/H RISK 1 DNA SPEC QL NAA+PROBE: NEGATIVE

## 2021-02-18 ENCOUNTER — HOSPITAL ENCOUNTER (OUTPATIENT)
Dept: BONE DENSITY | Age: 61
Discharge: HOME | End: 2021-02-18
Attending: FAMILY MEDICINE
Payer: COMMERCIAL

## 2021-02-18 ENCOUNTER — HOSPITAL ENCOUNTER (OUTPATIENT)
Dept: MAMMOGRAPHY | Age: 61
Discharge: HOME | End: 2021-02-18
Attending: FAMILY MEDICINE
Payer: COMMERCIAL

## 2021-02-18 ENCOUNTER — LAB ENCOUNTER (OUTPATIENT)
Dept: LAB | Age: 61
End: 2021-02-18
Attending: FAMILY MEDICINE
Payer: COMMERCIAL

## 2021-02-18 DIAGNOSIS — Z13.89 SCREENING FOR GENITOURINARY CONDITION: ICD-10-CM

## 2021-02-18 DIAGNOSIS — E03.9 HYPOTHYROIDISM, UNSPECIFIED TYPE: ICD-10-CM

## 2021-02-18 DIAGNOSIS — Z00.00 BLOOD TESTS FOR ROUTINE GENERAL PHYSICAL EXAMINATION: ICD-10-CM

## 2021-02-18 DIAGNOSIS — Z12.31 ENCOUNTER FOR SCREENING MAMMOGRAM FOR BREAST CANCER: ICD-10-CM

## 2021-02-18 DIAGNOSIS — Z13.820 SCREENING FOR OSTEOPOROSIS: ICD-10-CM

## 2021-02-18 LAB
ALBUMIN SERPL-MCNC: 3.8 G/DL (ref 3.4–5)
ALBUMIN/GLOB SERPL: 0.9 {RATIO} (ref 1–2)
ALP LIVER SERPL-CCNC: 69 U/L
ALT SERPL-CCNC: 24 U/L
ANION GAP SERPL CALC-SCNC: 3 MMOL/L (ref 0–18)
AST SERPL-CCNC: 18 U/L (ref 15–37)
BASOPHILS # BLD AUTO: 0.04 X10(3) UL (ref 0–0.2)
BASOPHILS NFR BLD AUTO: 0.7 %
BILIRUB SERPL-MCNC: 0.4 MG/DL (ref 0.1–2)
BILIRUB UR QL STRIP.AUTO: NEGATIVE
BUN BLD-MCNC: 31 MG/DL (ref 7–18)
BUN/CREAT SERPL: 32.3 (ref 10–20)
CALCIUM BLD-MCNC: 9.9 MG/DL (ref 8.5–10.1)
CHLORIDE SERPL-SCNC: 106 MMOL/L (ref 98–112)
CHOLEST SMN-MCNC: 148 MG/DL (ref ?–200)
CO2 SERPL-SCNC: 31 MMOL/L (ref 21–32)
COLOR UR AUTO: YELLOW
CREAT BLD-MCNC: 0.96 MG/DL
DEPRECATED RDW RBC AUTO: 41.7 FL (ref 35.1–46.3)
EOSINOPHIL # BLD AUTO: 0.06 X10(3) UL (ref 0–0.7)
EOSINOPHIL NFR BLD AUTO: 1 %
ERYTHROCYTE [DISTWIDTH] IN BLOOD BY AUTOMATED COUNT: 12.1 % (ref 11–15)
GLOBULIN PLAS-MCNC: 4.2 G/DL (ref 2.8–4.4)
GLUCOSE BLD-MCNC: 101 MG/DL (ref 70–99)
GLUCOSE UR STRIP.AUTO-MCNC: NEGATIVE MG/DL
HCT VFR BLD AUTO: 39.2 %
HDLC SERPL-MCNC: 54 MG/DL (ref 40–59)
HGB BLD-MCNC: 13 G/DL
IMM GRANULOCYTES # BLD AUTO: 0.01 X10(3) UL (ref 0–1)
IMM GRANULOCYTES NFR BLD: 0.2 %
KETONES UR STRIP.AUTO-MCNC: NEGATIVE MG/DL
LDLC SERPL CALC-MCNC: 82 MG/DL (ref ?–100)
LEUKOCYTE ESTERASE UR QL STRIP.AUTO: NEGATIVE
LYMPHOCYTES # BLD AUTO: 2.07 X10(3) UL (ref 1–4)
LYMPHOCYTES NFR BLD AUTO: 35.4 %
M PROTEIN MFR SERPL ELPH: 8 G/DL (ref 6.4–8.2)
MCH RBC QN AUTO: 31.3 PG (ref 26–34)
MCHC RBC AUTO-ENTMCNC: 33.2 G/DL (ref 31–37)
MCV RBC AUTO: 94.2 FL
MONOCYTES # BLD AUTO: 0.38 X10(3) UL (ref 0.1–1)
MONOCYTES NFR BLD AUTO: 6.5 %
NEUTROPHILS # BLD AUTO: 3.28 X10 (3) UL (ref 1.5–7.7)
NEUTROPHILS # BLD AUTO: 3.28 X10(3) UL (ref 1.5–7.7)
NEUTROPHILS NFR BLD AUTO: 56.2 %
NITRITE UR QL STRIP.AUTO: NEGATIVE
NONHDLC SERPL-MCNC: 94 MG/DL (ref ?–130)
OSMOLALITY SERPL CALC.SUM OF ELEC: 297 MOSM/KG (ref 275–295)
PATIENT FASTING Y/N/NP: YES
PATIENT FASTING Y/N/NP: YES
PH UR STRIP.AUTO: 7 [PH] (ref 4.5–8)
PLATELET # BLD AUTO: 233 10(3)UL (ref 150–450)
POTASSIUM SERPL-SCNC: 3.8 MMOL/L (ref 3.5–5.1)
PROT UR STRIP.AUTO-MCNC: NEGATIVE MG/DL
RBC # BLD AUTO: 4.16 X10(6)UL
RBC UR QL AUTO: NEGATIVE
SODIUM SERPL-SCNC: 140 MMOL/L (ref 136–145)
SP GR UR STRIP.AUTO: 1.02 (ref 1–1.03)
T4 FREE SERPL-MCNC: 1 NG/DL (ref 0.8–1.7)
TRIGL SERPL-MCNC: 59 MG/DL (ref 30–149)
TSI SER-ACNC: 0.82 MIU/ML (ref 0.36–3.74)
UROBILINOGEN UR STRIP.AUTO-MCNC: <2 MG/DL
VLDLC SERPL CALC-MCNC: 12 MG/DL (ref 0–30)
WBC # BLD AUTO: 5.8 X10(3) UL (ref 4–11)

## 2021-02-18 PROCEDURE — 84439 ASSAY OF FREE THYROXINE: CPT | Performed by: FAMILY MEDICINE

## 2021-02-18 PROCEDURE — 77080 DXA BONE DENSITY AXIAL: CPT | Performed by: FAMILY MEDICINE

## 2021-02-18 PROCEDURE — 77063 BREAST TOMOSYNTHESIS BI: CPT | Performed by: FAMILY MEDICINE

## 2021-02-18 PROCEDURE — 36415 COLL VENOUS BLD VENIPUNCTURE: CPT | Performed by: FAMILY MEDICINE

## 2021-02-18 PROCEDURE — 77067 SCR MAMMO BI INCL CAD: CPT | Performed by: FAMILY MEDICINE

## 2021-02-18 PROCEDURE — 80050 GENERAL HEALTH PANEL: CPT | Performed by: FAMILY MEDICINE

## 2021-02-18 PROCEDURE — 81001 URINALYSIS AUTO W/SCOPE: CPT | Performed by: FAMILY MEDICINE

## 2021-02-18 PROCEDURE — 80061 LIPID PANEL: CPT | Performed by: FAMILY MEDICINE

## 2021-02-22 ENCOUNTER — LAB ENCOUNTER (OUTPATIENT)
Dept: LAB | Facility: HOSPITAL | Age: 61
End: 2021-02-22
Attending: INTERNAL MEDICINE
Payer: COMMERCIAL

## 2021-02-22 DIAGNOSIS — Z01.818 PRE-OP TESTING: ICD-10-CM

## 2021-02-23 LAB — SARS-COV-2 RNA RESP QL NAA+PROBE: NOT DETECTED

## 2021-02-23 RX ORDER — HYDROCHLOROTHIAZIDE 25 MG/1
TABLET ORAL
Qty: 90 TABLET | Refills: 1 | Status: SHIPPED | OUTPATIENT
Start: 2021-02-23 | End: 2021-08-17

## 2021-02-23 RX ORDER — LEVOTHYROXINE SODIUM 0.1 MG/1
TABLET ORAL
Qty: 90 TABLET | Refills: 1 | Status: SHIPPED | OUTPATIENT
Start: 2021-02-23 | End: 2021-08-17

## 2021-02-23 RX ORDER — LOSARTAN POTASSIUM 100 MG/1
TABLET ORAL
Qty: 90 TABLET | Refills: 1 | Status: SHIPPED | OUTPATIENT
Start: 2021-02-23 | End: 2021-08-17

## 2021-02-25 PROBLEM — Z12.11 SPECIAL SCREENING FOR MALIGNANT NEOPLASM OF COLON: Status: ACTIVE | Noted: 2021-02-25

## 2021-02-25 PROBLEM — Z80.0 FAMILY HISTORY OF COLON CANCER: Status: ACTIVE | Noted: 2021-02-25

## 2021-02-25 PROBLEM — D12.4 BENIGN NEOPLASM OF DESCENDING COLON: Status: ACTIVE | Noted: 2021-02-25

## 2021-03-17 ENCOUNTER — TELEPHONE (OUTPATIENT)
Dept: FAMILY MEDICINE CLINIC | Facility: CLINIC | Age: 61
End: 2021-03-17

## 2021-03-17 ENCOUNTER — IMMUNIZATION (OUTPATIENT)
Dept: LAB | Age: 61
End: 2021-03-17
Attending: HOSPITALIST
Payer: COMMERCIAL

## 2021-03-17 DIAGNOSIS — Z23 NEED FOR VACCINATION: Primary | ICD-10-CM

## 2021-03-17 PROCEDURE — 0001A SARSCOV2 VAC 30MCG/0.3ML IM: CPT

## 2021-03-17 NOTE — TELEPHONE ENCOUNTER
Pt is supposed to have 2nd covid vaccine 4/7/21 and 3rd hep b 4/8/21. She is wondering if that is ok. Please advise. Thank you.

## 2021-03-17 NOTE — TELEPHONE ENCOUNTER
S/w pt. Advised her she should wait at least 2 weeks after covid vaccine prior to any other vaccines. She voiced understanding. She will r/s her current appt for hep B.

## 2021-04-05 RX ORDER — OMEGA-3-ACID ETHYL ESTERS 1 G/1
4 CAPSULE, LIQUID FILLED ORAL DAILY
Qty: 360 CAPSULE | Refills: 1 | Status: SHIPPED | OUTPATIENT
Start: 2021-04-05 | End: 2021-08-17

## 2021-04-05 RX ORDER — ATORVASTATIN CALCIUM 10 MG/1
TABLET, FILM COATED ORAL
Qty: 90 TABLET | Refills: 1 | Status: SHIPPED | OUTPATIENT
Start: 2021-04-05 | End: 2021-08-17

## 2021-04-07 ENCOUNTER — IMMUNIZATION (OUTPATIENT)
Dept: LAB | Age: 61
End: 2021-04-07
Attending: HOSPITALIST
Payer: COMMERCIAL

## 2021-04-07 DIAGNOSIS — Z23 NEED FOR VACCINATION: Primary | ICD-10-CM

## 2021-04-07 PROCEDURE — 0002A SARSCOV2 VAC 30MCG/0.3ML IM: CPT

## 2021-04-22 ENCOUNTER — NURSE ONLY (OUTPATIENT)
Dept: FAMILY MEDICINE CLINIC | Facility: CLINIC | Age: 61
End: 2021-04-22
Payer: COMMERCIAL

## 2021-04-22 PROCEDURE — 90746 HEPB VACCINE 3 DOSE ADULT IM: CPT | Performed by: FAMILY MEDICINE

## 2021-04-22 PROCEDURE — 90471 IMMUNIZATION ADMIN: CPT | Performed by: FAMILY MEDICINE

## 2021-04-22 NOTE — PROGRESS NOTES
Patient is in office for a Nurse visit for her Hep B #3. Patient tolerated immunization. Patient was offered a VIS and declined at this time.

## 2021-04-29 ENCOUNTER — LAB ENCOUNTER (OUTPATIENT)
Dept: LAB | Age: 61
End: 2021-04-29
Attending: FAMILY MEDICINE
Payer: COMMERCIAL

## 2021-04-29 ENCOUNTER — OFFICE VISIT (OUTPATIENT)
Dept: FAMILY MEDICINE CLINIC | Facility: CLINIC | Age: 61
End: 2021-04-29
Payer: COMMERCIAL

## 2021-04-29 ENCOUNTER — MED REC SCAN ONLY (OUTPATIENT)
Dept: FAMILY MEDICINE CLINIC | Facility: CLINIC | Age: 61
End: 2021-04-29

## 2021-04-29 VITALS
TEMPERATURE: 99 F | HEIGHT: 63.5 IN | BODY MASS INDEX: 25.37 KG/M2 | HEART RATE: 64 BPM | WEIGHT: 145 LBS | DIASTOLIC BLOOD PRESSURE: 62 MMHG | SYSTOLIC BLOOD PRESSURE: 108 MMHG | RESPIRATION RATE: 12 BRPM

## 2021-04-29 DIAGNOSIS — M79.641 RIGHT HAND PAIN: Primary | ICD-10-CM

## 2021-04-29 DIAGNOSIS — M79.642 LEFT HAND PAIN: ICD-10-CM

## 2021-04-29 DIAGNOSIS — M25.542 ARTHRALGIA OF BOTH HANDS: ICD-10-CM

## 2021-04-29 DIAGNOSIS — M25.541 ARTHRALGIA OF BOTH HANDS: ICD-10-CM

## 2021-04-29 PROCEDURE — 86200 CCP ANTIBODY: CPT | Performed by: FAMILY MEDICINE

## 2021-04-29 PROCEDURE — 86431 RHEUMATOID FACTOR QUANT: CPT | Performed by: FAMILY MEDICINE

## 2021-04-29 PROCEDURE — 3008F BODY MASS INDEX DOCD: CPT | Performed by: FAMILY MEDICINE

## 2021-04-29 PROCEDURE — 3074F SYST BP LT 130 MM HG: CPT | Performed by: FAMILY MEDICINE

## 2021-04-29 PROCEDURE — 85025 COMPLETE CBC W/AUTO DIFF WBC: CPT | Performed by: FAMILY MEDICINE

## 2021-04-29 PROCEDURE — 99214 OFFICE O/P EST MOD 30 MIN: CPT | Performed by: FAMILY MEDICINE

## 2021-04-29 PROCEDURE — 85652 RBC SED RATE AUTOMATED: CPT | Performed by: FAMILY MEDICINE

## 2021-04-29 PROCEDURE — 3078F DIAST BP <80 MM HG: CPT | Performed by: FAMILY MEDICINE

## 2021-04-29 PROCEDURE — 86140 C-REACTIVE PROTEIN: CPT | Performed by: FAMILY MEDICINE

## 2021-04-29 NOTE — PROGRESS NOTES
Nancy Pérez is a 57year old female. CHIEF COMPLAINT   Hand pain  HPI:   Recently having hand pain - about a month. Right 3rd finger DIP joint. Notices 2nd fingers turning.   Left 2nd DIP also with some swelling but this doesn't bother her like the Unspecified essential hypertension       Social History:  Social History    Tobacco Use      Smoking status: Former Smoker        Packs/day: 0.00        Years: 22.00        Pack years: 0        Types: Cigarettes        Quit date: 3/16/1990        Years sin

## 2021-04-30 RX ORDER — OMEPRAZOLE 40 MG/1
CAPSULE, DELAYED RELEASE ORAL
Qty: 90 CAPSULE | Refills: 0 | Status: SHIPPED | OUTPATIENT
Start: 2021-04-30 | End: 2021-07-27

## 2021-04-30 RX ORDER — FLUOXETINE HYDROCHLORIDE 20 MG/1
CAPSULE ORAL
Qty: 90 CAPSULE | Refills: 0 | Status: SHIPPED | OUTPATIENT
Start: 2021-04-30 | End: 2021-07-27

## 2021-07-27 RX ORDER — OMEPRAZOLE 40 MG/1
CAPSULE, DELAYED RELEASE ORAL
Qty: 90 CAPSULE | Refills: 0 | Status: SHIPPED | OUTPATIENT
Start: 2021-07-27 | End: 2021-08-17

## 2021-07-27 RX ORDER — FLUOXETINE HYDROCHLORIDE 20 MG/1
CAPSULE ORAL
Qty: 90 CAPSULE | Refills: 0 | Status: SHIPPED | OUTPATIENT
Start: 2021-07-27 | End: 2021-08-17

## 2021-08-17 ENCOUNTER — OFFICE VISIT (OUTPATIENT)
Dept: FAMILY MEDICINE CLINIC | Facility: CLINIC | Age: 61
End: 2021-08-17
Payer: COMMERCIAL

## 2021-08-17 VITALS
WEIGHT: 149 LBS | TEMPERATURE: 98 F | SYSTOLIC BLOOD PRESSURE: 112 MMHG | RESPIRATION RATE: 16 BRPM | HEART RATE: 76 BPM | BODY MASS INDEX: 26.74 KG/M2 | HEIGHT: 62.75 IN | DIASTOLIC BLOOD PRESSURE: 74 MMHG

## 2021-08-17 DIAGNOSIS — I10 BENIGN ESSENTIAL HYPERTENSION: Primary | ICD-10-CM

## 2021-08-17 DIAGNOSIS — D47.2 MGUS (MONOCLONAL GAMMOPATHY OF UNKNOWN SIGNIFICANCE): ICD-10-CM

## 2021-08-17 DIAGNOSIS — E74.39 GLUCOSE INTOLERANCE: ICD-10-CM

## 2021-08-17 DIAGNOSIS — E04.2 MULTIPLE THYROID NODULES: ICD-10-CM

## 2021-08-17 DIAGNOSIS — G43.001 MIGRAINE WITHOUT AURA AND WITH STATUS MIGRAINOSUS, NOT INTRACTABLE: ICD-10-CM

## 2021-08-17 DIAGNOSIS — E03.9 HYPOTHYROIDISM, UNSPECIFIED TYPE: ICD-10-CM

## 2021-08-17 DIAGNOSIS — E78.5 HYPERLIPIDEMIA, UNSPECIFIED HYPERLIPIDEMIA TYPE: ICD-10-CM

## 2021-08-17 PROCEDURE — 3078F DIAST BP <80 MM HG: CPT | Performed by: FAMILY MEDICINE

## 2021-08-17 PROCEDURE — 99214 OFFICE O/P EST MOD 30 MIN: CPT | Performed by: FAMILY MEDICINE

## 2021-08-17 PROCEDURE — 3008F BODY MASS INDEX DOCD: CPT | Performed by: FAMILY MEDICINE

## 2021-08-17 PROCEDURE — 3074F SYST BP LT 130 MM HG: CPT | Performed by: FAMILY MEDICINE

## 2021-08-17 RX ORDER — OMEPRAZOLE 40 MG/1
40 CAPSULE, DELAYED RELEASE ORAL DAILY
Qty: 90 CAPSULE | Refills: 1 | Status: SHIPPED | OUTPATIENT
Start: 2021-08-17

## 2021-08-17 RX ORDER — AMITRIPTYLINE HYDROCHLORIDE 25 MG/1
25 TABLET, FILM COATED ORAL NIGHTLY
Qty: 90 TABLET | Refills: 3 | Status: CANCELLED | OUTPATIENT
Start: 2021-08-17

## 2021-08-17 RX ORDER — LOSARTAN POTASSIUM 100 MG/1
100 TABLET ORAL DAILY
Qty: 90 TABLET | Refills: 1 | Status: SHIPPED | OUTPATIENT
Start: 2021-08-17

## 2021-08-17 RX ORDER — FLUOXETINE HYDROCHLORIDE 20 MG/1
20 CAPSULE ORAL DAILY
Qty: 90 CAPSULE | Refills: 1 | Status: SHIPPED | OUTPATIENT
Start: 2021-08-17

## 2021-08-17 RX ORDER — HYDROCHLOROTHIAZIDE 25 MG/1
25 TABLET ORAL DAILY
Qty: 90 TABLET | Refills: 1 | Status: SHIPPED | OUTPATIENT
Start: 2021-08-17

## 2021-08-17 RX ORDER — OMEGA-3-ACID ETHYL ESTERS 1 G/1
4 CAPSULE, LIQUID FILLED ORAL DAILY
Qty: 360 CAPSULE | Refills: 1 | Status: SHIPPED | OUTPATIENT
Start: 2021-08-17

## 2021-08-17 RX ORDER — LEVOTHYROXINE SODIUM 0.1 MG/1
100 TABLET ORAL DAILY
Qty: 90 TABLET | Refills: 1 | Status: SHIPPED | OUTPATIENT
Start: 2021-08-17

## 2021-08-17 RX ORDER — ATORVASTATIN CALCIUM 10 MG/1
10 TABLET, FILM COATED ORAL DAILY
Qty: 90 TABLET | Refills: 1 | Status: SHIPPED | OUTPATIENT
Start: 2021-08-17

## 2021-08-17 NOTE — PROGRESS NOTES
CHIEF COMPLAINT:   Silviano Blount a 57year old female is here for followup on HTN  HPI:   Silviano Blount has been doing well since the last visit here. Silviano Blount  is compliant with hypertensive medication. No chest pain. No dyspnea.  .  No sherly Omeprazole 40 MG Oral Capsule Delayed Release 90 capsule 1     Sig: Take 1 capsule (40 mg total) by mouth daily. • atorvastatin 10 MG Oral Tab 90 tablet 1     Sig: Take 1 tablet (10 mg total) by mouth daily.    • hydrochlorothiazide 25 MG Oral Tab 90 tabl

## 2021-08-20 ENCOUNTER — APPOINTMENT (OUTPATIENT)
Dept: HEMATOLOGY/ONCOLOGY | Facility: HOSPITAL | Age: 61
End: 2021-08-20
Attending: FAMILY MEDICINE

## 2021-08-26 ENCOUNTER — HOSPITAL ENCOUNTER (OUTPATIENT)
Dept: ULTRASOUND IMAGING | Facility: HOSPITAL | Age: 61
Discharge: HOME | End: 2021-08-26
Attending: FAMILY MEDICINE

## 2021-08-26 ENCOUNTER — TELEPHONE (OUTPATIENT)
Dept: OTHER | Facility: HOSPITAL | Age: 61
End: 2021-08-26

## 2021-08-26 ENCOUNTER — APPOINTMENT (OUTPATIENT)
Dept: HEMATOLOGY/ONCOLOGY | Facility: HOSPITAL | Age: 61
End: 2021-08-26
Attending: FAMILY MEDICINE

## 2021-08-26 DIAGNOSIS — Z13.9 ENCOUNTER FOR SCREENING: ICD-10-CM

## 2021-08-26 NOTE — PROGRESS NOTES
PV/Stroke Screening     LM on VM to go over PV results  Pt is active on my chart and visualized results prior to call MD already sent message about results also nurse heart line left with any questions      .

## 2021-08-27 ENCOUNTER — NURSE ONLY (OUTPATIENT)
Dept: HEMATOLOGY/ONCOLOGY | Facility: HOSPITAL | Age: 61
End: 2021-08-27
Attending: FAMILY MEDICINE
Payer: COMMERCIAL

## 2021-08-27 DIAGNOSIS — D47.2 MONOCLONAL GAMMOPATHY: Primary | ICD-10-CM

## 2021-08-27 DIAGNOSIS — E03.9 HYPOTHYROIDISM, UNSPECIFIED TYPE: ICD-10-CM

## 2021-08-27 DIAGNOSIS — I10 BENIGN ESSENTIAL HYPERTENSION: ICD-10-CM

## 2021-08-27 DIAGNOSIS — E74.39 GLUCOSE INTOLERANCE: ICD-10-CM

## 2021-08-27 DIAGNOSIS — E78.5 HYPERLIPIDEMIA, UNSPECIFIED HYPERLIPIDEMIA TYPE: ICD-10-CM

## 2021-08-27 LAB
ALBUMIN SERPL-MCNC: 3.7 G/DL (ref 3.4–5)
ALBUMIN/GLOB SERPL: 0.8 {RATIO} (ref 1–2)
ALP LIVER SERPL-CCNC: 75 U/L
ALT SERPL-CCNC: 27 U/L
ANION GAP SERPL CALC-SCNC: 5 MMOL/L (ref 0–18)
AST SERPL-CCNC: 21 U/L (ref 15–37)
BASOPHILS # BLD AUTO: 0.03 X10(3) UL (ref 0–0.2)
BASOPHILS NFR BLD AUTO: 0.4 %
BILIRUB SERPL-MCNC: 0.3 MG/DL (ref 0.1–2)
BUN BLD-MCNC: 27 MG/DL (ref 7–18)
CALCIUM BLD-MCNC: 10.1 MG/DL (ref 8.5–10.1)
CHLORIDE SERPL-SCNC: 106 MMOL/L (ref 98–112)
CHOLEST SMN-MCNC: 140 MG/DL (ref ?–200)
CO2 SERPL-SCNC: 29 MMOL/L (ref 21–32)
CREAT BLD-MCNC: 1.1 MG/DL
EOSINOPHIL # BLD AUTO: 0.08 X10(3) UL (ref 0–0.7)
EOSINOPHIL NFR BLD AUTO: 1.2 %
ERYTHROCYTE [DISTWIDTH] IN BLOOD BY AUTOMATED COUNT: 12 %
EST. AVERAGE GLUCOSE BLD GHB EST-MCNC: 134 MG/DL (ref 68–126)
GLOBULIN PLAS-MCNC: 4.7 G/DL (ref 2.8–4.4)
GLUCOSE BLD-MCNC: 103 MG/DL (ref 70–99)
HBA1C MFR BLD HPLC: 6.3 % (ref ?–5.7)
HCT VFR BLD AUTO: 40.2 %
HDLC SERPL-MCNC: 49 MG/DL (ref 40–59)
HGB BLD-MCNC: 13.3 G/DL
IGA SERPL-MCNC: 95.3 MG/DL (ref 70–312)
IGM SERPL-MCNC: 101 MG/DL (ref 43–279)
IMM GRANULOCYTES # BLD AUTO: 0.02 X10(3) UL (ref 0–1)
IMM GRANULOCYTES NFR BLD: 0.3 %
IMMUNOGLOBULIN PNL SER-MCNC: 1770 MG/DL (ref 791–1643)
LDH SERPL L TO P-CCNC: 178 U/L
LDLC SERPL CALC-MCNC: 67 MG/DL (ref ?–100)
LYMPHOCYTES # BLD AUTO: 2.08 X10(3) UL (ref 1–4)
LYMPHOCYTES NFR BLD AUTO: 30.2 %
M PROTEIN MFR SERPL ELPH: 8.4 G/DL (ref 6.4–8.2)
MCH RBC QN AUTO: 30.9 PG (ref 26–34)
MCHC RBC AUTO-ENTMCNC: 33.1 G/DL (ref 31–37)
MCV RBC AUTO: 93.5 FL
MONOCYTES # BLD AUTO: 0.42 X10(3) UL (ref 0.1–1)
MONOCYTES NFR BLD AUTO: 6.1 %
NEUTROPHILS # BLD AUTO: 4.26 X10 (3) UL (ref 1.5–7.7)
NEUTROPHILS # BLD AUTO: 4.26 X10(3) UL (ref 1.5–7.7)
NEUTROPHILS NFR BLD AUTO: 61.8 %
NONHDLC SERPL-MCNC: 91 MG/DL (ref ?–130)
OSMOLALITY SERPL CALC.SUM OF ELEC: 295 MOSM/KG (ref 275–295)
PLATELET # BLD AUTO: 234 10(3)UL (ref 150–450)
POTASSIUM SERPL-SCNC: 4.1 MMOL/L (ref 3.5–5.1)
RBC # BLD AUTO: 4.3 X10(6)UL
SODIUM SERPL-SCNC: 140 MMOL/L (ref 136–145)
T4 FREE SERPL-MCNC: 1 NG/DL (ref 0.8–1.7)
TRIGL SERPL-MCNC: 141 MG/DL (ref 30–149)
TSI SER-ACNC: 2.24 MIU/ML (ref 0.36–3.74)
VLDLC SERPL CALC-MCNC: 21 MG/DL (ref 0–30)
WBC # BLD AUTO: 6.9 X10(3) UL (ref 4–11)

## 2021-08-27 PROCEDURE — 80053 COMPREHEN METABOLIC PANEL: CPT

## 2021-08-27 PROCEDURE — 83883 ASSAY NEPHELOMETRY NOT SPEC: CPT

## 2021-08-27 PROCEDURE — 82784 ASSAY IGA/IGD/IGG/IGM EACH: CPT

## 2021-08-27 PROCEDURE — 80061 LIPID PANEL: CPT

## 2021-08-27 PROCEDURE — 84165 PROTEIN E-PHORESIS SERUM: CPT

## 2021-08-27 PROCEDURE — 84439 ASSAY OF FREE THYROXINE: CPT

## 2021-08-27 PROCEDURE — 83036 HEMOGLOBIN GLYCOSYLATED A1C: CPT

## 2021-08-27 PROCEDURE — 84443 ASSAY THYROID STIM HORMONE: CPT

## 2021-08-27 PROCEDURE — 86334 IMMUNOFIX E-PHORESIS SERUM: CPT

## 2021-08-27 PROCEDURE — 36415 COLL VENOUS BLD VENIPUNCTURE: CPT

## 2021-08-27 PROCEDURE — 83615 LACTATE (LD) (LDH) ENZYME: CPT

## 2021-08-27 PROCEDURE — 85025 COMPLETE CBC W/AUTO DIFF WBC: CPT

## 2021-09-01 LAB
ALBUMIN SERPL ELPH-MCNC: 4.45 G/DL (ref 3.75–5.21)
ALBUMIN/GLOB SERPL: 1.19 {RATIO} (ref 1–2)
ALPHA1 GLOB SERPL ELPH-MCNC: 0.31 G/DL (ref 0.19–0.46)
ALPHA2 GLOB SERPL ELPH-MCNC: 0.93 G/DL (ref 0.48–1.05)
B-GLOBULIN SERPL ELPH-MCNC: 0.8 G/DL (ref 0.68–1.23)
GAMMA GLOB SERPL ELPH-MCNC: 1.7 G/DL (ref 0.62–1.7)
KAPPA LC FREE SER-MCNC: 1.8 MG/DL (ref 0.33–1.94)
KAPPA LC FREE/LAMBDA FREE SER NEPH: 1.56 {RATIO} (ref 0.26–1.65)
LAMBDA LC FREE SERPL-MCNC: 1.16 MG/DL (ref 0.57–2.63)
M PROTEIN MFR SERPL ELPH: 8.2 G/DL (ref 6.4–8.2)
M-SPIKE 1: 1.11 G/DL (ref ?–0)

## 2021-09-16 ENCOUNTER — HOSPITAL ENCOUNTER (OUTPATIENT)
Dept: ULTRASOUND IMAGING | Age: 61
Discharge: HOME | End: 2021-09-16
Attending: FAMILY MEDICINE
Payer: COMMERCIAL

## 2021-09-16 DIAGNOSIS — E04.2 MULTIPLE THYROID NODULES: ICD-10-CM

## 2021-09-16 PROCEDURE — 76536 US EXAM OF HEAD AND NECK: CPT | Performed by: FAMILY MEDICINE

## 2021-11-22 ENCOUNTER — TELEPHONE (OUTPATIENT)
Dept: NEUROLOGY | Facility: CLINIC | Age: 61
End: 2021-11-22

## 2021-11-22 DIAGNOSIS — G43.001 MIGRAINE WITHOUT AURA AND WITH STATUS MIGRAINOSUS, NOT INTRACTABLE: ICD-10-CM

## 2021-11-22 RX ORDER — AMITRIPTYLINE HYDROCHLORIDE 25 MG/1
TABLET, FILM COATED ORAL
Qty: 30 TABLET | Refills: 0 | Status: SHIPPED | OUTPATIENT
Start: 2021-11-22 | End: 2021-12-01

## 2021-11-22 NOTE — TELEPHONE ENCOUNTER
Medication: Amitriptyline 25 mg     Date of last refill: 11/05/2020 with 3 addt refills  [place]      Last office visit: 11/05/20  Due back to clinic per last office note:   RTN in 1 year  Date next office visit schedule

## 2021-12-01 ENCOUNTER — OFFICE VISIT (OUTPATIENT)
Dept: NEUROLOGY | Facility: CLINIC | Age: 61
End: 2021-12-01
Payer: COMMERCIAL

## 2021-12-01 VITALS
RESPIRATION RATE: 16 BRPM | BODY MASS INDEX: 26.91 KG/M2 | SYSTOLIC BLOOD PRESSURE: 114 MMHG | DIASTOLIC BLOOD PRESSURE: 71 MMHG | WEIGHT: 150 LBS | HEART RATE: 82 BPM | HEIGHT: 62.75 IN

## 2021-12-01 DIAGNOSIS — G43.001 MIGRAINE WITHOUT AURA AND WITH STATUS MIGRAINOSUS, NOT INTRACTABLE: ICD-10-CM

## 2021-12-01 PROCEDURE — 3074F SYST BP LT 130 MM HG: CPT | Performed by: PHYSICIAN ASSISTANT

## 2021-12-01 PROCEDURE — 3078F DIAST BP <80 MM HG: CPT | Performed by: PHYSICIAN ASSISTANT

## 2021-12-01 PROCEDURE — 3008F BODY MASS INDEX DOCD: CPT | Performed by: PHYSICIAN ASSISTANT

## 2021-12-01 PROCEDURE — 99213 OFFICE O/P EST LOW 20 MIN: CPT | Performed by: PHYSICIAN ASSISTANT

## 2021-12-01 RX ORDER — AMITRIPTYLINE HYDROCHLORIDE 25 MG/1
25 TABLET, FILM COATED ORAL NIGHTLY
Qty: 90 TABLET | Refills: 3 | Status: SHIPPED | OUTPATIENT
Start: 2021-12-01

## 2021-12-01 RX ORDER — BUTALBITAL, ACETAMINOPHEN AND CAFFEINE 50; 325; 40 MG/1; MG/1; MG/1
TABLET ORAL
Qty: 30 TABLET | Refills: 0 | Status: SHIPPED | OUTPATIENT
Start: 2021-12-01

## 2021-12-01 NOTE — PROGRESS NOTES
[facility] with [address]  3/16/1960  Primary Care Provider:  Abdi Sinclair MD    12/1/2021  Accompanied visit: No  Previous visit and existing record notes reviewed in pr daily., Disp: 90 tablet, Rfl: 1  •  Levothyroxine Sodium 100 MCG Oral Tab, Take 1 tablet (100 mcg total) by mouth daily. , Disp: 90 tablet, Rfl: 1  •  Omega-3-acid Ethyl Esters 1 g Oral Cap, Take 4 capsules (4 g total) by mouth daily. , Disp: 360 capsule, Rf

## 2021-12-21 ENCOUNTER — MED REC SCAN ONLY (OUTPATIENT)
Dept: FAMILY MEDICINE CLINIC | Facility: CLINIC | Age: 61
End: 2021-12-21

## 2022-03-10 ENCOUNTER — OFFICE VISIT (OUTPATIENT)
Dept: FAMILY MEDICINE CLINIC | Facility: CLINIC | Age: 62
End: 2022-03-10
Payer: COMMERCIAL

## 2022-03-10 ENCOUNTER — LAB ENCOUNTER (OUTPATIENT)
Dept: LAB | Age: 62
End: 2022-03-10
Attending: FAMILY MEDICINE
Payer: COMMERCIAL

## 2022-03-10 VITALS
DIASTOLIC BLOOD PRESSURE: 78 MMHG | SYSTOLIC BLOOD PRESSURE: 116 MMHG | HEIGHT: 62.75 IN | BODY MASS INDEX: 27.09 KG/M2 | TEMPERATURE: 97 F | RESPIRATION RATE: 16 BRPM | WEIGHT: 151 LBS | HEART RATE: 84 BPM

## 2022-03-10 DIAGNOSIS — G43.001 MIGRAINE WITHOUT AURA AND WITH STATUS MIGRAINOSUS, NOT INTRACTABLE: ICD-10-CM

## 2022-03-10 DIAGNOSIS — Z00.00 BLOOD TESTS FOR ROUTINE GENERAL PHYSICAL EXAMINATION: ICD-10-CM

## 2022-03-10 DIAGNOSIS — Z12.31 BREAST CANCER SCREENING BY MAMMOGRAM: ICD-10-CM

## 2022-03-10 DIAGNOSIS — E03.9 HYPOTHYROIDISM, UNSPECIFIED TYPE: ICD-10-CM

## 2022-03-10 DIAGNOSIS — Z00.00 ROUTINE GENERAL MEDICAL EXAMINATION AT A HEALTH CARE FACILITY: Primary | ICD-10-CM

## 2022-03-10 DIAGNOSIS — R10.11 RUQ PAIN: ICD-10-CM

## 2022-03-10 DIAGNOSIS — Z12.4 PAPANICOLAOU SMEAR FOR CERVICAL CANCER SCREENING: ICD-10-CM

## 2022-03-10 LAB
ALBUMIN SERPL-MCNC: 3.8 G/DL (ref 3.4–5)
ALBUMIN/GLOB SERPL: 0.9 {RATIO} (ref 1–2)
ALP LIVER SERPL-CCNC: 80 U/L
ALT SERPL-CCNC: 24 U/L
ANION GAP SERPL CALC-SCNC: 3 MMOL/L (ref 0–18)
AST SERPL-CCNC: 24 U/L (ref 15–37)
BASOPHILS # BLD AUTO: 0.03 X10(3) UL (ref 0–0.2)
BASOPHILS NFR BLD AUTO: 0.4 %
BILIRUB SERPL-MCNC: 0.4 MG/DL (ref 0.1–2)
BUN BLD-MCNC: 27 MG/DL (ref 7–18)
CALCIUM BLD-MCNC: 10 MG/DL (ref 8.5–10.1)
CHLORIDE SERPL-SCNC: 103 MMOL/L (ref 98–112)
CHOLEST SERPL-MCNC: 136 MG/DL (ref ?–200)
CO2 SERPL-SCNC: 32 MMOL/L (ref 21–32)
CREAT BLD-MCNC: 1.09 MG/DL
EOSINOPHIL # BLD AUTO: 0.07 X10(3) UL (ref 0–0.7)
EOSINOPHIL NFR BLD AUTO: 0.9 %
ERYTHROCYTE [DISTWIDTH] IN BLOOD BY AUTOMATED COUNT: 12 %
EST. AVERAGE GLUCOSE BLD GHB EST-MCNC: 126 MG/DL (ref 68–126)
FASTING PATIENT LIPID ANSWER: YES
FASTING STATUS PATIENT QL REPORTED: YES
GLOBULIN PLAS-MCNC: 4.1 G/DL (ref 2.8–4.4)
GLUCOSE BLD-MCNC: 108 MG/DL (ref 70–99)
HBA1C MFR BLD: 6 % (ref ?–5.7)
HCT VFR BLD AUTO: 40.4 %
HDLC SERPL-MCNC: 43 MG/DL (ref 40–59)
HGB BLD-MCNC: 13.6 G/DL
IMM GRANULOCYTES # BLD AUTO: 0.02 X10(3) UL (ref 0–1)
IMM GRANULOCYTES NFR BLD: 0.3 %
LDLC SERPL CALC-MCNC: 70 MG/DL (ref ?–100)
LYMPHOCYTES # BLD AUTO: 1.67 X10(3) UL (ref 1–4)
LYMPHOCYTES NFR BLD AUTO: 22 %
MCH RBC QN AUTO: 31.4 PG (ref 26–34)
MCHC RBC AUTO-ENTMCNC: 33.7 G/DL (ref 31–37)
MCV RBC AUTO: 93.3 FL
MONOCYTES # BLD AUTO: 0.5 X10(3) UL (ref 0.1–1)
MONOCYTES NFR BLD AUTO: 6.6 %
NEUTROPHILS # BLD AUTO: 5.3 X10 (3) UL (ref 1.5–7.7)
NEUTROPHILS # BLD AUTO: 5.3 X10(3) UL (ref 1.5–7.7)
NEUTROPHILS NFR BLD AUTO: 69.8 %
NONHDLC SERPL-MCNC: 93 MG/DL (ref ?–130)
OSMOLALITY SERPL CALC.SUM OF ELEC: 292 MOSM/KG (ref 275–295)
PLATELET # BLD AUTO: 247 10(3)UL (ref 150–450)
POTASSIUM SERPL-SCNC: 3.7 MMOL/L (ref 3.5–5.1)
PROT SERPL-MCNC: 7.9 G/DL (ref 6.4–8.2)
RBC # BLD AUTO: 4.33 X10(6)UL
SODIUM SERPL-SCNC: 138 MMOL/L (ref 136–145)
T4 FREE SERPL-MCNC: 1.2 NG/DL (ref 0.8–1.7)
TRIGL SERPL-MCNC: 129 MG/DL (ref 30–149)
TSI SER-ACNC: 0.68 MIU/ML (ref 0.36–3.74)
VLDLC SERPL CALC-MCNC: 20 MG/DL (ref 0–30)
WBC # BLD AUTO: 7.6 X10(3) UL (ref 4–11)

## 2022-03-10 PROCEDURE — 3008F BODY MASS INDEX DOCD: CPT | Performed by: FAMILY MEDICINE

## 2022-03-10 PROCEDURE — 83036 HEMOGLOBIN GLYCOSYLATED A1C: CPT | Performed by: FAMILY MEDICINE

## 2022-03-10 PROCEDURE — 87624 HPV HI-RISK TYP POOLED RSLT: CPT | Performed by: FAMILY MEDICINE

## 2022-03-10 PROCEDURE — 99396 PREV VISIT EST AGE 40-64: CPT | Performed by: FAMILY MEDICINE

## 2022-03-10 PROCEDURE — 80061 LIPID PANEL: CPT | Performed by: FAMILY MEDICINE

## 2022-03-10 PROCEDURE — 3074F SYST BP LT 130 MM HG: CPT | Performed by: FAMILY MEDICINE

## 2022-03-10 PROCEDURE — 80050 GENERAL HEALTH PANEL: CPT | Performed by: FAMILY MEDICINE

## 2022-03-10 PROCEDURE — 3078F DIAST BP <80 MM HG: CPT | Performed by: FAMILY MEDICINE

## 2022-03-10 PROCEDURE — 84439 ASSAY OF FREE THYROXINE: CPT | Performed by: FAMILY MEDICINE

## 2022-03-10 RX ORDER — FLUOXETINE HYDROCHLORIDE 20 MG/1
20 CAPSULE ORAL DAILY
Qty: 90 CAPSULE | Refills: 1 | Status: SHIPPED | OUTPATIENT
Start: 2022-03-10

## 2022-03-10 RX ORDER — BUTALBITAL, ACETAMINOPHEN AND CAFFEINE 50; 325; 40 MG/1; MG/1; MG/1
TABLET ORAL
Qty: 90 TABLET | Refills: 0 | Status: CANCELLED | OUTPATIENT
Start: 2022-03-10

## 2022-03-10 RX ORDER — LOSARTAN POTASSIUM 100 MG/1
100 TABLET ORAL DAILY
Qty: 90 TABLET | Refills: 1 | Status: SHIPPED | OUTPATIENT
Start: 2022-03-10

## 2022-03-10 RX ORDER — ATORVASTATIN CALCIUM 10 MG/1
10 TABLET, FILM COATED ORAL DAILY
Qty: 90 TABLET | Refills: 1 | Status: SHIPPED | OUTPATIENT
Start: 2022-03-10

## 2022-03-10 RX ORDER — HYDROCHLOROTHIAZIDE 25 MG/1
25 TABLET ORAL DAILY
Qty: 90 TABLET | Refills: 1 | Status: SHIPPED | OUTPATIENT
Start: 2022-03-10

## 2022-03-10 RX ORDER — OMEPRAZOLE 40 MG/1
40 CAPSULE, DELAYED RELEASE ORAL DAILY
Qty: 90 CAPSULE | Refills: 1 | Status: SHIPPED | OUTPATIENT
Start: 2022-03-10

## 2022-03-10 RX ORDER — LEVOTHYROXINE SODIUM 0.1 MG/1
100 TABLET ORAL DAILY
Qty: 90 TABLET | Refills: 1 | Status: SHIPPED | OUTPATIENT
Start: 2022-03-10

## 2022-03-15 ENCOUNTER — NURSE ONLY (OUTPATIENT)
Dept: HEMATOLOGY/ONCOLOGY | Facility: HOSPITAL | Age: 62
End: 2022-03-15
Attending: INTERNAL MEDICINE
Payer: COMMERCIAL

## 2022-03-15 DIAGNOSIS — D47.2 MONOCLONAL GAMMOPATHY: ICD-10-CM

## 2022-03-15 LAB
ALBUMIN SERPL-MCNC: 3.6 G/DL (ref 3.4–5)
ALBUMIN/GLOB SERPL: 0.8 {RATIO} (ref 1–2)
ALP LIVER SERPL-CCNC: 79 U/L
ALT SERPL-CCNC: 25 U/L
ANION GAP SERPL CALC-SCNC: 3 MMOL/L (ref 0–18)
AST SERPL-CCNC: 22 U/L (ref 15–37)
BASOPHILS # BLD AUTO: 0.03 X10(3) UL (ref 0–0.2)
BASOPHILS NFR BLD AUTO: 0.5 %
BILIRUB SERPL-MCNC: 0.2 MG/DL (ref 0.1–2)
BUN BLD-MCNC: 19 MG/DL (ref 7–18)
CALCIUM BLD-MCNC: 10 MG/DL (ref 8.5–10.1)
CHLORIDE SERPL-SCNC: 107 MMOL/L (ref 98–112)
CO2 SERPL-SCNC: 29 MMOL/L (ref 21–32)
CREAT BLD-MCNC: 1.11 MG/DL
EOSINOPHIL # BLD AUTO: 0.07 X10(3) UL (ref 0–0.7)
EOSINOPHIL NFR BLD AUTO: 1.1 %
ERYTHROCYTE [DISTWIDTH] IN BLOOD BY AUTOMATED COUNT: 11.9 %
FASTING STATUS PATIENT QL REPORTED: NO
GLOBULIN PLAS-MCNC: 4.5 G/DL (ref 2.8–4.4)
GLUCOSE BLD-MCNC: 114 MG/DL (ref 70–99)
HCT VFR BLD AUTO: 40 %
HGB BLD-MCNC: 13.7 G/DL
IGA SERPL-MCNC: 86.2 MG/DL (ref 70–312)
IGM SERPL-MCNC: 96.4 MG/DL (ref 43–279)
IMM GRANULOCYTES # BLD AUTO: 0.01 X10(3) UL (ref 0–1)
IMM GRANULOCYTES NFR BLD: 0.2 %
IMMUNOGLOBULIN PNL SER-MCNC: 1550 MG/DL (ref 791–1643)
LDH SERPL L TO P-CCNC: 212 U/L
LYMPHOCYTES # BLD AUTO: 1.88 X10(3) UL (ref 1–4)
LYMPHOCYTES NFR BLD AUTO: 30.7 %
MCH RBC QN AUTO: 31.7 PG (ref 26–34)
MCHC RBC AUTO-ENTMCNC: 34.3 G/DL (ref 31–37)
MCV RBC AUTO: 92.6 FL
MONOCYTES # BLD AUTO: 0.42 X10(3) UL (ref 0.1–1)
MONOCYTES NFR BLD AUTO: 6.9 %
NEUTROPHILS # BLD AUTO: 3.72 X10 (3) UL (ref 1.5–7.7)
NEUTROPHILS # BLD AUTO: 3.72 X10(3) UL (ref 1.5–7.7)
NEUTROPHILS NFR BLD AUTO: 60.6 %
OSMOLALITY SERPL CALC.SUM OF ELEC: 291 MOSM/KG (ref 275–295)
PLATELET # BLD AUTO: 250 10(3)UL (ref 150–450)
POTASSIUM SERPL-SCNC: 3.8 MMOL/L (ref 3.5–5.1)
PROT SERPL-MCNC: 8.1 G/DL (ref 6.4–8.2)
RBC # BLD AUTO: 4.32 X10(6)UL
SODIUM SERPL-SCNC: 139 MMOL/L (ref 136–145)
WBC # BLD AUTO: 6.1 X10(3) UL (ref 4–11)

## 2022-03-15 PROCEDURE — 83521 IG LIGHT CHAINS FREE EACH: CPT

## 2022-03-15 PROCEDURE — 80053 COMPREHEN METABOLIC PANEL: CPT

## 2022-03-15 PROCEDURE — 86334 IMMUNOFIX E-PHORESIS SERUM: CPT

## 2022-03-15 PROCEDURE — 82784 ASSAY IGA/IGD/IGG/IGM EACH: CPT

## 2022-03-15 PROCEDURE — 84165 PROTEIN E-PHORESIS SERUM: CPT

## 2022-03-15 PROCEDURE — 85025 COMPLETE CBC W/AUTO DIFF WBC: CPT

## 2022-03-15 PROCEDURE — 83615 LACTATE (LD) (LDH) ENZYME: CPT

## 2022-03-15 PROCEDURE — 36415 COLL VENOUS BLD VENIPUNCTURE: CPT

## 2022-03-17 ENCOUNTER — HOSPITAL ENCOUNTER (OUTPATIENT)
Dept: MAMMOGRAPHY | Age: 62
Discharge: HOME | End: 2022-03-17
Attending: FAMILY MEDICINE
Payer: COMMERCIAL

## 2022-03-17 DIAGNOSIS — Z12.31 BREAST CANCER SCREENING BY MAMMOGRAM: ICD-10-CM

## 2022-03-17 PROCEDURE — 77067 SCR MAMMO BI INCL CAD: CPT | Performed by: FAMILY MEDICINE

## 2022-03-17 PROCEDURE — 77063 BREAST TOMOSYNTHESIS BI: CPT | Performed by: FAMILY MEDICINE

## 2022-03-21 LAB
ALBUMIN SERPL ELPH-MCNC: 4.01 G/DL (ref 3.75–5.21)
ALPHA1 GLOB SERPL ELPH-MCNC: 0.32 G/DL (ref 0.19–0.46)
ALPHA2 GLOB SERPL ELPH-MCNC: 0.94 G/DL (ref 0.48–1.05)
B-GLOBULIN SERPL ELPH-MCNC: 0.79 G/DL (ref 0.68–1.23)
GAMMA GLOB SERPL ELPH-MCNC: 1.63 G/DL (ref 0.62–1.7)
KAPPA LC FREE SER-MCNC: 1.85 MG/DL (ref 0.33–1.94)
KAPPA LC FREE/LAMBDA FREE SER NEPH: 1.28 {RATIO} (ref 0.26–1.65)
LAMBDA LC FREE SERPL-MCNC: 1.44 MG/DL (ref 0.57–2.63)
M PROTEIN 1 SERPL ELPH-MCNC: 1.09 G/DL (ref ?–0)
PROT SERPL-MCNC: 7.7 G/DL (ref 6.4–8.2)

## 2022-03-25 LAB — HPV I/H RISK 1 DNA SPEC QL NAA+PROBE: NEGATIVE

## 2022-04-04 RX ORDER — OMEGA-3-ACID ETHYL ESTERS 1 G/1
4 CAPSULE, LIQUID FILLED ORAL DAILY
Qty: 360 CAPSULE | Refills: 1 | Status: SHIPPED | OUTPATIENT
Start: 2022-04-04

## 2022-04-05 ENCOUNTER — HOSPITAL ENCOUNTER (OUTPATIENT)
Dept: ULTRASOUND IMAGING | Age: 62
Discharge: HOME | End: 2022-04-05
Attending: FAMILY MEDICINE
Payer: COMMERCIAL

## 2022-04-05 DIAGNOSIS — R10.11 RUQ PAIN: ICD-10-CM

## 2022-04-05 PROCEDURE — 76700 US EXAM ABDOM COMPLETE: CPT | Performed by: FAMILY MEDICINE

## 2022-05-07 ENCOUNTER — HOSPITAL ENCOUNTER (EMERGENCY)
Facility: HOSPITAL | Age: 62
Discharge: HOME | End: 2022-05-08
Attending: EMERGENCY MEDICINE
Payer: COMMERCIAL

## 2022-05-07 ENCOUNTER — APPOINTMENT (OUTPATIENT)
Dept: CT IMAGING | Facility: HOSPITAL | Age: 62
End: 2022-05-07
Attending: EMERGENCY MEDICINE
Payer: COMMERCIAL

## 2022-05-07 VITALS
HEART RATE: 70 BPM | SYSTOLIC BLOOD PRESSURE: 135 MMHG | OXYGEN SATURATION: 95 % | BODY MASS INDEX: 26 KG/M2 | RESPIRATION RATE: 20 BRPM | WEIGHT: 148 LBS | DIASTOLIC BLOOD PRESSURE: 75 MMHG | TEMPERATURE: 98 F

## 2022-05-07 DIAGNOSIS — N20.1 URETEROLITHIASIS: Primary | ICD-10-CM

## 2022-05-07 LAB
BASOPHILS # BLD AUTO: 0.04 X10(3) UL (ref 0–0.2)
BASOPHILS NFR BLD AUTO: 0.6 %
BILIRUB UR QL STRIP.AUTO: NEGATIVE
COLOR UR AUTO: YELLOW
EOSINOPHIL # BLD AUTO: 0.09 X10(3) UL (ref 0–0.7)
EOSINOPHIL NFR BLD AUTO: 1.3 %
ERYTHROCYTE [DISTWIDTH] IN BLOOD BY AUTOMATED COUNT: 12.2 %
GLUCOSE UR STRIP.AUTO-MCNC: NEGATIVE MG/DL
HCT VFR BLD AUTO: 34.1 %
HGB BLD-MCNC: 11.5 G/DL
IMM GRANULOCYTES # BLD AUTO: 0.01 X10(3) UL (ref 0–1)
IMM GRANULOCYTES NFR BLD: 0.1 %
KETONES UR STRIP.AUTO-MCNC: NEGATIVE MG/DL
LEUKOCYTE ESTERASE UR QL STRIP.AUTO: NEGATIVE
LYMPHOCYTES # BLD AUTO: 2.57 X10(3) UL (ref 1–4)
LYMPHOCYTES NFR BLD AUTO: 37.7 %
MCH RBC QN AUTO: 31.5 PG (ref 26–34)
MCHC RBC AUTO-ENTMCNC: 33.7 G/DL (ref 31–37)
MCV RBC AUTO: 93.4 FL
MONOCYTES # BLD AUTO: 0.53 X10(3) UL (ref 0.1–1)
MONOCYTES NFR BLD AUTO: 7.8 %
NEUTROPHILS # BLD AUTO: 3.57 X10 (3) UL (ref 1.5–7.7)
NEUTROPHILS # BLD AUTO: 3.57 X10(3) UL (ref 1.5–7.7)
NEUTROPHILS NFR BLD AUTO: 52.5 %
NITRITE UR QL STRIP.AUTO: NEGATIVE
PH UR STRIP.AUTO: 5 [PH] (ref 5–8)
PLATELET # BLD AUTO: 215 10(3)UL (ref 150–450)
PROT UR STRIP.AUTO-MCNC: 30 MG/DL
RBC # BLD AUTO: 3.65 X10(6)UL
RBC #/AREA URNS AUTO: >10 /HPF
SP GR UR STRIP.AUTO: 1.02 (ref 1–1.03)
UROBILINOGEN UR STRIP.AUTO-MCNC: <2 MG/DL
WBC # BLD AUTO: 6.8 X10(3) UL (ref 4–11)

## 2022-05-07 PROCEDURE — 80053 COMPREHEN METABOLIC PANEL: CPT | Performed by: EMERGENCY MEDICINE

## 2022-05-07 PROCEDURE — 81001 URINALYSIS AUTO W/SCOPE: CPT

## 2022-05-07 PROCEDURE — 99284 EMERGENCY DEPT VISIT MOD MDM: CPT

## 2022-05-07 PROCEDURE — 85025 COMPLETE CBC W/AUTO DIFF WBC: CPT | Performed by: EMERGENCY MEDICINE

## 2022-05-07 PROCEDURE — 96361 HYDRATE IV INFUSION ADD-ON: CPT

## 2022-05-07 PROCEDURE — 96374 THER/PROPH/DIAG INJ IV PUSH: CPT

## 2022-05-07 PROCEDURE — 81001 URINALYSIS AUTO W/SCOPE: CPT | Performed by: EMERGENCY MEDICINE

## 2022-05-07 PROCEDURE — 96375 TX/PRO/DX INJ NEW DRUG ADDON: CPT

## 2022-05-07 PROCEDURE — 74176 CT ABD & PELVIS W/O CONTRAST: CPT | Performed by: EMERGENCY MEDICINE

## 2022-05-07 RX ORDER — MORPHINE SULFATE 4 MG/ML
4 INJECTION, SOLUTION INTRAMUSCULAR; INTRAVENOUS ONCE
Status: COMPLETED | OUTPATIENT
Start: 2022-05-07 | End: 2022-05-07

## 2022-05-07 RX ORDER — ONDANSETRON 2 MG/ML
4 INJECTION INTRAMUSCULAR; INTRAVENOUS ONCE
Status: COMPLETED | OUTPATIENT
Start: 2022-05-07 | End: 2022-05-07

## 2022-05-08 LAB
ALBUMIN SERPL-MCNC: 3.4 G/DL (ref 3.4–5)
ALBUMIN/GLOB SERPL: 0.9 {RATIO} (ref 1–2)
ALP LIVER SERPL-CCNC: 74 U/L
ALT SERPL-CCNC: 26 U/L
ANION GAP SERPL CALC-SCNC: 5 MMOL/L (ref 0–18)
AST SERPL-CCNC: 20 U/L (ref 15–37)
BILIRUB SERPL-MCNC: 0.2 MG/DL (ref 0.1–2)
BUN BLD-MCNC: 26 MG/DL (ref 7–18)
CALCIUM BLD-MCNC: 9.6 MG/DL (ref 8.5–10.1)
CHLORIDE SERPL-SCNC: 107 MMOL/L (ref 98–112)
CO2 SERPL-SCNC: 28 MMOL/L (ref 21–32)
CREAT BLD-MCNC: 1.31 MG/DL
GLOBULIN PLAS-MCNC: 4 G/DL (ref 2.8–4.4)
GLUCOSE BLD-MCNC: 109 MG/DL (ref 70–99)
OSMOLALITY SERPL CALC.SUM OF ELEC: 295 MOSM/KG (ref 275–295)
POTASSIUM SERPL-SCNC: 3.5 MMOL/L (ref 3.5–5.1)
PROT SERPL-MCNC: 7.4 G/DL (ref 6.4–8.2)
SODIUM SERPL-SCNC: 140 MMOL/L (ref 136–145)

## 2022-05-08 RX ORDER — KETOROLAC TROMETHAMINE 30 MG/ML
15 INJECTION, SOLUTION INTRAMUSCULAR; INTRAVENOUS ONCE
Status: COMPLETED | OUTPATIENT
Start: 2022-05-08 | End: 2022-05-08

## 2022-05-08 RX ORDER — TAMSULOSIN HYDROCHLORIDE 0.4 MG/1
0.4 CAPSULE ORAL ONCE
Status: COMPLETED | OUTPATIENT
Start: 2022-05-08 | End: 2022-05-08

## 2022-05-08 RX ORDER — TAMSULOSIN HYDROCHLORIDE 0.4 MG/1
0.4 CAPSULE ORAL DAILY
Qty: 7 CAPSULE | Refills: 0 | Status: SHIPPED | OUTPATIENT
Start: 2022-05-08 | End: 2022-05-15

## 2022-05-08 RX ORDER — HYDROCODONE BITARTRATE AND ACETAMINOPHEN 5; 325 MG/1; MG/1
1-2 TABLET ORAL EVERY 6 HOURS PRN
Qty: 10 TABLET | Refills: 0 | Status: SHIPPED | OUTPATIENT
Start: 2022-05-08 | End: 2022-05-13

## 2022-05-08 RX ORDER — ONDANSETRON 4 MG/1
4 TABLET, ORALLY DISINTEGRATING ORAL EVERY 4 HOURS PRN
Qty: 10 TABLET | Refills: 0 | Status: SHIPPED | OUTPATIENT
Start: 2022-05-08 | End: 2022-05-15

## 2022-05-25 ENCOUNTER — OFFICE VISIT (OUTPATIENT)
Dept: HEMATOLOGY/ONCOLOGY | Facility: HOSPITAL | Age: 62
End: 2022-05-25
Attending: INTERNAL MEDICINE
Payer: COMMERCIAL

## 2022-05-25 VITALS
BODY MASS INDEX: 27 KG/M2 | SYSTOLIC BLOOD PRESSURE: 128 MMHG | WEIGHT: 153.81 LBS | TEMPERATURE: 98 F | OXYGEN SATURATION: 98 % | HEART RATE: 87 BPM | RESPIRATION RATE: 18 BRPM | DIASTOLIC BLOOD PRESSURE: 82 MMHG

## 2022-05-25 DIAGNOSIS — D47.2 MONOCLONAL GAMMOPATHY: Primary | ICD-10-CM

## 2022-05-25 PROCEDURE — 99213 OFFICE O/P EST LOW 20 MIN: CPT | Performed by: INTERNAL MEDICINE

## 2022-05-25 NOTE — PROGRESS NOTES
Patient is here for follow up for monoclonal gammopathy. She reports that she is feeling well. Her appetite and energy are good. She denies any fever. She has some night sweats that are hot flashes. This is mostly during the day and occasionally at night. She denies any pain.    She had labs done in March and is now following up as requested by Dr. Radha Da Silva    Education Record    Learner:  Patient    Disease / Diagnosis: monoclonal gammopathy    Barriers / Limitations:  None   Comments:    Method:  Discussion   Comments:    General Topics:  Side effects and symptom management   Comments:    Outcome:  Shows understanding   Comments:

## 2022-09-08 RX ORDER — HYDROCHLOROTHIAZIDE 25 MG/1
TABLET ORAL
Qty: 90 TABLET | Refills: 0 | Status: SHIPPED | OUTPATIENT
Start: 2022-09-08

## 2022-09-08 RX ORDER — FLUOXETINE HYDROCHLORIDE 20 MG/1
CAPSULE ORAL
Qty: 90 CAPSULE | Refills: 0 | Status: SHIPPED | OUTPATIENT
Start: 2022-09-08

## 2022-09-08 RX ORDER — ATORVASTATIN CALCIUM 10 MG/1
TABLET, FILM COATED ORAL
Qty: 90 TABLET | Refills: 0 | Status: SHIPPED | OUTPATIENT
Start: 2022-09-08

## 2022-09-08 RX ORDER — LOSARTAN POTASSIUM 100 MG/1
TABLET ORAL
Qty: 90 TABLET | Refills: 0 | Status: SHIPPED | OUTPATIENT
Start: 2022-09-08

## 2022-09-08 RX ORDER — OMEPRAZOLE 40 MG/1
CAPSULE, DELAYED RELEASE ORAL
Qty: 90 CAPSULE | Refills: 0 | Status: SHIPPED | OUTPATIENT
Start: 2022-09-08

## 2022-09-08 RX ORDER — LEVOTHYROXINE SODIUM 0.1 MG/1
TABLET ORAL
Qty: 90 TABLET | Refills: 0 | Status: SHIPPED | OUTPATIENT
Start: 2022-09-08

## 2022-09-08 NOTE — TELEPHONE ENCOUNTER
LOV: 3/10/22 (CPX)     Last Refill:   FLUoxetine 20 MG Oral Cap, 3/10/22, #90, 1 RF    Omeprazole 40 MG Oral Capsule Delayed Release, 3/10/22, #90, 1 RF    atorvastatin 10 MG Oral Tab, 3/10/22, #90, 1 RF (protocol passed)     hydroCHLOROthiazide 25 MG Oral Tab, 3/10/22, #90, 1 RF (protocol passed)    losartan 100 MG Oral Tab, 3/10/22, #90, 1 RF (protocol passed)     levothyroxine 100 MCG Oral Tab, 3/10/22, #90, 1 RF (protocol passed)     Next OV: 10/6/22    Please authorize if acceptable. Thank you!

## 2022-09-25 RX ORDER — OMEGA-3-ACID ETHYL ESTERS 1 G/1
4 CAPSULE, LIQUID FILLED ORAL DAILY
Qty: 360 CAPSULE | Refills: 1 | Status: SHIPPED | OUTPATIENT
Start: 2022-09-25

## 2022-10-06 ENCOUNTER — LAB ENCOUNTER (OUTPATIENT)
Dept: LAB | Facility: HOSPITAL | Age: 62
End: 2022-10-06
Attending: FAMILY MEDICINE
Payer: COMMERCIAL

## 2022-10-06 ENCOUNTER — OFFICE VISIT (OUTPATIENT)
Dept: FAMILY MEDICINE CLINIC | Facility: CLINIC | Age: 62
End: 2022-10-06
Payer: COMMERCIAL

## 2022-10-06 VITALS
DIASTOLIC BLOOD PRESSURE: 76 MMHG | RESPIRATION RATE: 16 BRPM | WEIGHT: 155.63 LBS | HEART RATE: 60 BPM | HEIGHT: 62.75 IN | BODY MASS INDEX: 27.92 KG/M2 | SYSTOLIC BLOOD PRESSURE: 116 MMHG | TEMPERATURE: 98 F

## 2022-10-06 DIAGNOSIS — F43.21 GRIEF REACTION: ICD-10-CM

## 2022-10-06 DIAGNOSIS — E74.39 GLUCOSE INTOLERANCE: ICD-10-CM

## 2022-10-06 DIAGNOSIS — I10 BENIGN ESSENTIAL HYPERTENSION: Primary | ICD-10-CM

## 2022-10-06 DIAGNOSIS — E78.5 HYPERLIPIDEMIA, UNSPECIFIED HYPERLIPIDEMIA TYPE: ICD-10-CM

## 2022-10-06 DIAGNOSIS — Z23 NEED FOR VACCINATION: ICD-10-CM

## 2022-10-06 LAB
ALBUMIN SERPL-MCNC: 3.7 G/DL (ref 3.4–5)
ALBUMIN/GLOB SERPL: 0.9 {RATIO} (ref 1–2)
ALP LIVER SERPL-CCNC: 78 U/L
ALT SERPL-CCNC: 27 U/L
ANION GAP SERPL CALC-SCNC: 5 MMOL/L (ref 0–18)
AST SERPL-CCNC: 20 U/L (ref 15–37)
BILIRUB SERPL-MCNC: 0.3 MG/DL (ref 0.1–2)
BUN BLD-MCNC: 23 MG/DL (ref 7–18)
CALCIUM BLD-MCNC: 10.4 MG/DL (ref 8.5–10.1)
CHLORIDE SERPL-SCNC: 105 MMOL/L (ref 98–112)
CHOLEST SERPL-MCNC: 142 MG/DL (ref ?–200)
CO2 SERPL-SCNC: 29 MMOL/L (ref 21–32)
CREAT BLD-MCNC: 1.09 MG/DL
EST. AVERAGE GLUCOSE BLD GHB EST-MCNC: 131 MG/DL (ref 68–126)
FASTING PATIENT LIPID ANSWER: YES
FASTING STATUS PATIENT QL REPORTED: YES
GFR SERPLBLD BASED ON 1.73 SQ M-ARVRAT: 57 ML/MIN/1.73M2 (ref 60–?)
GLOBULIN PLAS-MCNC: 4.3 G/DL (ref 2.8–4.4)
GLUCOSE BLD-MCNC: 100 MG/DL (ref 70–99)
HBA1C MFR BLD: 6.2 % (ref ?–5.7)
HDLC SERPL-MCNC: 48 MG/DL (ref 40–59)
LDLC SERPL CALC-MCNC: 74 MG/DL (ref ?–100)
NONHDLC SERPL-MCNC: 94 MG/DL (ref ?–130)
OSMOLALITY SERPL CALC.SUM OF ELEC: 292 MOSM/KG (ref 275–295)
POTASSIUM SERPL-SCNC: 3.7 MMOL/L (ref 3.5–5.1)
PROT SERPL-MCNC: 8 G/DL (ref 6.4–8.2)
SODIUM SERPL-SCNC: 139 MMOL/L (ref 136–145)
TRIGL SERPL-MCNC: 108 MG/DL (ref 30–149)
VLDLC SERPL CALC-MCNC: 17 MG/DL (ref 0–30)

## 2022-10-06 PROCEDURE — 80061 LIPID PANEL: CPT

## 2022-10-06 PROCEDURE — 90686 IIV4 VACC NO PRSV 0.5 ML IM: CPT | Performed by: FAMILY MEDICINE

## 2022-10-06 PROCEDURE — 90471 IMMUNIZATION ADMIN: CPT | Performed by: FAMILY MEDICINE

## 2022-10-06 PROCEDURE — 83036 HEMOGLOBIN GLYCOSYLATED A1C: CPT

## 2022-10-06 PROCEDURE — 3078F DIAST BP <80 MM HG: CPT | Performed by: FAMILY MEDICINE

## 2022-10-06 PROCEDURE — 3074F SYST BP LT 130 MM HG: CPT | Performed by: FAMILY MEDICINE

## 2022-10-06 PROCEDURE — 3008F BODY MASS INDEX DOCD: CPT | Performed by: FAMILY MEDICINE

## 2022-10-06 PROCEDURE — 36415 COLL VENOUS BLD VENIPUNCTURE: CPT

## 2022-10-06 PROCEDURE — 80053 COMPREHEN METABOLIC PANEL: CPT

## 2022-10-06 RX ORDER — OMEPRAZOLE 40 MG/1
40 CAPSULE, DELAYED RELEASE ORAL DAILY
Qty: 90 CAPSULE | Refills: 1 | Status: SHIPPED | OUTPATIENT
Start: 2022-10-06

## 2022-10-06 RX ORDER — ATORVASTATIN CALCIUM 10 MG/1
10 TABLET, FILM COATED ORAL DAILY
Qty: 90 TABLET | Refills: 1 | Status: SHIPPED | OUTPATIENT
Start: 2022-10-06

## 2022-10-06 RX ORDER — FLUOXETINE HYDROCHLORIDE 20 MG/1
20 CAPSULE ORAL DAILY
Qty: 90 CAPSULE | Refills: 1 | Status: SHIPPED | OUTPATIENT
Start: 2022-10-06

## 2022-10-06 RX ORDER — HYDROCHLOROTHIAZIDE 25 MG/1
25 TABLET ORAL DAILY
Qty: 90 TABLET | Refills: 1 | Status: SHIPPED | OUTPATIENT
Start: 2022-10-06

## 2022-10-06 RX ORDER — LOSARTAN POTASSIUM 100 MG/1
100 TABLET ORAL DAILY
Qty: 90 TABLET | Refills: 1 | Status: SHIPPED | OUTPATIENT
Start: 2022-10-06

## 2022-10-06 RX ORDER — LEVOTHYROXINE SODIUM 0.1 MG/1
100 TABLET ORAL DAILY
Qty: 90 TABLET | Refills: 1 | Status: SHIPPED | OUTPATIENT
Start: 2022-10-06

## 2022-10-10 DIAGNOSIS — E83.52 HYPERCALCEMIA: Primary | ICD-10-CM

## 2022-10-18 ENCOUNTER — LAB ENCOUNTER (OUTPATIENT)
Dept: LAB | Facility: HOSPITAL | Age: 62
End: 2022-10-18
Attending: FAMILY MEDICINE
Payer: COMMERCIAL

## 2022-10-18 DIAGNOSIS — E83.52 HYPERCALCEMIA: ICD-10-CM

## 2022-10-18 LAB — PTH-INTACT SERPL-MCNC: 49.1 PG/ML (ref 18.5–88)

## 2022-10-18 PROCEDURE — 83970 ASSAY OF PARATHORMONE: CPT

## 2022-10-18 PROCEDURE — 82330 ASSAY OF CALCIUM: CPT

## 2022-10-18 PROCEDURE — 36415 COLL VENOUS BLD VENIPUNCTURE: CPT

## 2022-10-20 LAB
CALCIUM IONIZED PH 7.4: 1.39 MMOL/L
CALCIUM IONIZED, SERUM: 1.4 MMOL/L

## 2022-10-24 DIAGNOSIS — E83.52 SERUM CALCIUM ELEVATED: Primary | ICD-10-CM

## 2022-10-24 DIAGNOSIS — E83.52 HYPERCALCEMIA: ICD-10-CM

## 2022-10-28 ENCOUNTER — TELEPHONE (OUTPATIENT)
Dept: FAMILY MEDICINE CLINIC | Facility: CLINIC | Age: 62
End: 2022-10-28

## 2022-10-28 RX ORDER — AMLODIPINE BESYLATE 2.5 MG/1
2.5 TABLET ORAL DAILY
Qty: 30 TABLET | Refills: 0 | Status: SHIPPED | OUTPATIENT
Start: 2022-10-28

## 2022-10-28 NOTE — TELEPHONE ENCOUNTER
Pt requesting to speak with nurse regarding BP. Pt states she was advised to discontinue BP medication on 10/26 per SD. Pt notes that BP has appeared higher since stopping. This AM BP readin/90    Notes while taking BP medication readings were around 113/69. Pt would like to discuss further with nurse, please advise.

## 2022-10-28 NOTE — TELEPHONE ENCOUNTER
**power outage and building evacuation after this call initiated but not completed**    Call to pt-sts at last OV 10/6/22 BP was 116/76  sts received call from our ofc 10/25/22 w results of ionized calcium lab and advised not to take her hydrochlorothiazide, monitor her BP/call if elevated and  recheck an ionized calcium in 2 wks,     sts BPs have been as follows since:  10/25/22 113/69  10/27/22 142/88  Today  155/90    Pt sts has no symptoms but is concerned about persistently increasing BP without the hydrochlorothiazide 25 mg daily. Advised alondra HARPER is out of ofc today-will forward to dr waldrop/on call and call back w recommendations. Patient voices understanding/agrees with plan/no further questions. **see above and advise-thanks!

## 2022-11-02 ENCOUNTER — TELEPHONE (OUTPATIENT)
Dept: FAMILY MEDICINE CLINIC | Facility: CLINIC | Age: 62
End: 2022-11-02

## 2022-11-02 RX ORDER — AMLODIPINE BESYLATE 5 MG/1
5 TABLET ORAL DAILY
Qty: 30 TABLET | Refills: 0 | Status: SHIPPED | OUTPATIENT
Start: 2022-11-02

## 2022-11-02 NOTE — TELEPHONE ENCOUNTER
Please see B/P reads below. Was advised to discontinue hydrochlorothiazide on due to result of ionized calcium. Then was started on Amlodipine on 10/28/2022.

## 2022-11-02 NOTE — TELEPHONE ENCOUNTER
Pt's BP readings:    10/31: 148/93  6:15 am   145/93  12:30 pm   134/81  4:30 pm    11/1 155/93  6:30 am   131/85  4:30 pm     11/2 146/91  10:00 am      On 10/30 pt started new BP med, lowest dose.

## 2022-11-08 ENCOUNTER — LAB ENCOUNTER (OUTPATIENT)
Dept: LAB | Facility: HOSPITAL | Age: 62
End: 2022-11-08
Attending: FAMILY MEDICINE
Payer: COMMERCIAL

## 2022-11-08 DIAGNOSIS — E83.52 HYPERCALCEMIA: ICD-10-CM

## 2022-11-08 PROCEDURE — 36415 COLL VENOUS BLD VENIPUNCTURE: CPT

## 2022-11-08 PROCEDURE — 82330 ASSAY OF CALCIUM: CPT

## 2022-11-10 LAB
CALCIUM IONIZED PH 7.4: 1.37 MMOL/L
CALCIUM IONIZED, SERUM: 1.34 MMOL/L

## 2022-11-11 DIAGNOSIS — E83.52 SERUM CALCIUM ELEVATED: Primary | ICD-10-CM

## 2022-11-11 RX ORDER — AMLODIPINE BESYLATE 5 MG/1
7.5 TABLET ORAL DAILY
Qty: 30 TABLET | Refills: 0 | COMMUNITY
Start: 2022-11-11

## 2022-11-23 ENCOUNTER — TELEPHONE (OUTPATIENT)
Dept: FAMILY MEDICINE CLINIC | Facility: CLINIC | Age: 62
End: 2022-11-23

## 2022-11-23 PROCEDURE — 96374 THER/PROPH/DIAG INJ IV PUSH: CPT

## 2022-11-23 PROCEDURE — 99284 EMERGENCY DEPT VISIT MOD MDM: CPT

## 2022-11-23 PROCEDURE — 96375 TX/PRO/DX INJ NEW DRUG ADDON: CPT

## 2022-11-23 RX ORDER — AMLODIPINE BESYLATE 10 MG/1
10 TABLET ORAL DAILY
Qty: 90 TABLET | Refills: 0 | Status: SHIPPED | OUTPATIENT
Start: 2022-11-23

## 2022-11-23 NOTE — TELEPHONE ENCOUNTER
Increase amlodipine to 10 mg once daily and follow-up with me in about a month. Bring blood pressure readings to follow-up.

## 2022-11-23 NOTE — TELEPHONE ENCOUNTER
B/P Readings:  Currently Amlodipine 7.5 mg daily. No complaints. Pt takes medication before taking her B/P reads.     11/11  AM-137/91  Mid-111/73  PM-121/78    11/12  AM-130/89  Mid-114/76  PM-122/76    11/13  AM-142/91  Mid-126/78  PM-132/80    11/14  AM-160/96  Mid-151/93  PM-114/73    11/15  AM-140/89  Mid-124/82  PM-126/90    11/16  AM-136/90  Mid-118/76  PM-110/73    11/17  AM-100/72  Mid-107/74  PM-124/79    11/18  AM-140/84  Mid-103/73  PM-116/77    11/19  AM-138/82  PM-109/74    11/20  AM-132/86  Mid-105/70  PM-118/74    11/21  AM-132/88  PM-112/75    11/22  AM-140/84  Mid-110/74  PM-126/82    11/23  AM-145/85  Mid-110/79

## 2022-11-23 NOTE — TELEPHONE ENCOUNTER
Pt is demanding to speak with a nurse in regards of blood pressure readings. Pt stated the nurses told her to call back with readings.       Please advise

## 2022-11-23 NOTE — TELEPHONE ENCOUNTER
Unable to reach pt. [facility] sent. I called pt anyway and also went over recommendations and she voiced understanding.   RX sent to her [facility].

## 2022-11-24 ENCOUNTER — HOSPITAL ENCOUNTER (EMERGENCY)
Facility: HOSPITAL | Age: 62
Discharge: HOME | End: 2022-11-24
Attending: EMERGENCY MEDICINE
Payer: COMMERCIAL

## 2022-11-24 ENCOUNTER — APPOINTMENT (OUTPATIENT)
Dept: CT IMAGING | Facility: HOSPITAL | Age: 62
End: 2022-11-24
Attending: EMERGENCY MEDICINE
Payer: COMMERCIAL

## 2022-11-24 VITALS
SYSTOLIC BLOOD PRESSURE: 136 MMHG | TEMPERATURE: 97 F | DIASTOLIC BLOOD PRESSURE: 72 MMHG | RESPIRATION RATE: 18 BRPM | OXYGEN SATURATION: 98 % | HEIGHT: 62 IN | BODY MASS INDEX: 27.42 KG/M2 | HEART RATE: 70 BPM | WEIGHT: 149 LBS

## 2022-11-24 DIAGNOSIS — N20.1 URETEROLITHIASIS: Primary | ICD-10-CM

## 2022-11-24 DIAGNOSIS — R31.9 HEMATURIA, UNSPECIFIED TYPE: ICD-10-CM

## 2022-11-24 LAB
ALBUMIN SERPL-MCNC: 3.9 G/DL (ref 3.4–5)
ALBUMIN/GLOB SERPL: 0.8 {RATIO} (ref 1–2)
ALP LIVER SERPL-CCNC: 92 U/L
ALT SERPL-CCNC: 30 U/L
ANION GAP SERPL CALC-SCNC: 3 MMOL/L (ref 0–18)
AST SERPL-CCNC: 17 U/L (ref 15–37)
BASOPHILS # BLD AUTO: 0.03 X10(3) UL (ref 0–0.2)
BASOPHILS NFR BLD AUTO: 0.5 %
BILIRUB SERPL-MCNC: 0.2 MG/DL (ref 0.1–2)
BILIRUB UR QL STRIP.AUTO: NEGATIVE
BUN BLD-MCNC: 22 MG/DL (ref 7–18)
CALCIUM BLD-MCNC: 10 MG/DL (ref 8.5–10.1)
CHLORIDE SERPL-SCNC: 108 MMOL/L (ref 98–112)
CLARITY UR REFRACT.AUTO: CLEAR
CO2 SERPL-SCNC: 29 MMOL/L (ref 21–32)
COLOR UR AUTO: YELLOW
CREAT BLD-MCNC: 1.31 MG/DL
EOSINOPHIL # BLD AUTO: 0.06 X10(3) UL (ref 0–0.7)
EOSINOPHIL NFR BLD AUTO: 0.9 %
ERYTHROCYTE [DISTWIDTH] IN BLOOD BY AUTOMATED COUNT: 11.9 %
GFR SERPLBLD BASED ON 1.73 SQ M-ARVRAT: 46 ML/MIN/1.73M2 (ref 60–?)
GLOBULIN PLAS-MCNC: 4.9 G/DL (ref 2.8–4.4)
GLUCOSE BLD-MCNC: 106 MG/DL (ref 70–99)
GLUCOSE UR STRIP.AUTO-MCNC: NEGATIVE MG/DL
HCT VFR BLD AUTO: 39.5 %
HGB BLD-MCNC: 13.2 G/DL
HYALINE CASTS #/AREA URNS AUTO: PRESENT /LPF
HYALINE CASTS #/AREA URNS AUTO: PRESENT /LPF
IMM GRANULOCYTES # BLD AUTO: 0.01 X10(3) UL (ref 0–1)
IMM GRANULOCYTES NFR BLD: 0.2 %
KETONES UR STRIP.AUTO-MCNC: NEGATIVE MG/DL
LEUKOCYTE ESTERASE UR QL STRIP.AUTO: NEGATIVE
LYMPHOCYTES # BLD AUTO: 2.53 X10(3) UL (ref 1–4)
LYMPHOCYTES NFR BLD AUTO: 39.2 %
MCH RBC QN AUTO: 31.1 PG (ref 26–34)
MCHC RBC AUTO-ENTMCNC: 33.4 G/DL (ref 31–37)
MCV RBC AUTO: 92.9 FL
MONOCYTES # BLD AUTO: 0.46 X10(3) UL (ref 0.1–1)
MONOCYTES NFR BLD AUTO: 7.1 %
NEUTROPHILS # BLD AUTO: 3.36 X10 (3) UL (ref 1.5–7.7)
NEUTROPHILS # BLD AUTO: 3.36 X10(3) UL (ref 1.5–7.7)
NEUTROPHILS NFR BLD AUTO: 52.1 %
NITRITE UR QL STRIP.AUTO: NEGATIVE
OSMOLALITY SERPL CALC.SUM OF ELEC: 294 MOSM/KG (ref 275–295)
PH UR STRIP.AUTO: 5.5 [PH] (ref 5–8)
PLATELET # BLD AUTO: 282 10(3)UL (ref 150–450)
POTASSIUM SERPL-SCNC: 3.8 MMOL/L (ref 3.5–5.1)
PROT SERPL-MCNC: 8.8 G/DL (ref 6.4–8.2)
RBC # BLD AUTO: 4.25 X10(6)UL
RBC #/AREA URNS AUTO: >10 /HPF
RBC #/AREA URNS AUTO: >10 /HPF
SODIUM SERPL-SCNC: 140 MMOL/L (ref 136–145)
SP GR UR STRIP.AUTO: >=1.03 (ref 1–1.03)
UROBILINOGEN UR STRIP.AUTO-MCNC: 0.2 MG/DL
WBC # BLD AUTO: 6.5 X10(3) UL (ref 4–11)

## 2022-11-24 PROCEDURE — 81001 URINALYSIS AUTO W/SCOPE: CPT | Performed by: EMERGENCY MEDICINE

## 2022-11-24 PROCEDURE — 74176 CT ABD & PELVIS W/O CONTRAST: CPT | Performed by: EMERGENCY MEDICINE

## 2022-11-24 PROCEDURE — 80053 COMPREHEN METABOLIC PANEL: CPT | Performed by: EMERGENCY MEDICINE

## 2022-11-24 PROCEDURE — 85025 COMPLETE CBC W/AUTO DIFF WBC: CPT | Performed by: EMERGENCY MEDICINE

## 2022-11-24 PROCEDURE — 81015 MICROSCOPIC EXAM OF URINE: CPT | Performed by: EMERGENCY MEDICINE

## 2022-11-24 RX ORDER — HYDROCODONE BITARTRATE AND ACETAMINOPHEN 5; 325 MG/1; MG/1
1 TABLET ORAL ONCE
Status: COMPLETED | OUTPATIENT
Start: 2022-11-24 | End: 2022-11-24

## 2022-11-24 RX ORDER — HYDROMORPHONE HYDROCHLORIDE 1 MG/ML
INJECTION, SOLUTION INTRAMUSCULAR; INTRAVENOUS; SUBCUTANEOUS
Status: COMPLETED
Start: 2022-11-24 | End: 2022-11-24

## 2022-11-24 RX ORDER — HYDROMORPHONE HYDROCHLORIDE 1 MG/ML
0.5 INJECTION, SOLUTION INTRAMUSCULAR; INTRAVENOUS; SUBCUTANEOUS ONCE
Status: COMPLETED | OUTPATIENT
Start: 2022-11-24 | End: 2022-11-24

## 2022-11-24 RX ORDER — HYDROCODONE BITARTRATE AND ACETAMINOPHEN 5; 325 MG/1; MG/1
1 TABLET ORAL EVERY 6 HOURS PRN
Qty: 10 TABLET | Refills: 0 | Status: SHIPPED | OUTPATIENT
Start: 2022-11-24 | End: 2022-11-29

## 2022-11-24 RX ORDER — HYDROCODONE BITARTRATE AND ACETAMINOPHEN 5; 325 MG/1; MG/1
1-2 TABLET ORAL EVERY 4 HOURS PRN
Qty: 20 TABLET | Refills: 0 | Status: SHIPPED | OUTPATIENT
Start: 2022-11-24 | End: 2022-12-01

## 2022-11-24 RX ORDER — KETOROLAC TROMETHAMINE 15 MG/ML
15 INJECTION, SOLUTION INTRAMUSCULAR; INTRAVENOUS ONCE
Status: COMPLETED | OUTPATIENT
Start: 2022-11-24 | End: 2022-11-24

## 2022-11-24 NOTE — CM/SW NOTE
CM called [address] @ [phone] to confirm receipt of the norco RX. ANNIKA spoke with Pharmacist, Bette Soto, who states RX will be ready for  today. CM notified patient of above.

## 2022-11-24 NOTE — ED INITIAL ASSESSMENT (HPI)
Pt to ED with complaints of intermittent sharp pelvic pains, pt has complaints of urinary frequency. Pt took a norco at 2230 prior to arrival d/t the pain.  Pt denies N/V

## 2022-11-24 NOTE — CM/SW NOTE
Pt called stating when she was dc'd early this morning her pharmacy wasn't open and she did not realize they werent open today. Pt requesting that the norco be sent to [facility] on [address]. CM changed pt pharmacy in [facility] and will see if one of the ER MD's will resubmit the RX. CM advised the patient of the above.

## 2022-12-12 ENCOUNTER — LAB ENCOUNTER (OUTPATIENT)
Dept: LAB | Facility: HOSPITAL | Age: 62
End: 2022-12-12
Attending: FAMILY MEDICINE
Payer: COMMERCIAL

## 2022-12-12 DIAGNOSIS — E83.52 SERUM CALCIUM ELEVATED: ICD-10-CM

## 2022-12-12 PROCEDURE — 82330 ASSAY OF CALCIUM: CPT

## 2022-12-13 LAB
CALCIUM IONIZED PH 7.4: 1.35 MMOL/L
CALCIUM IONIZED, SERUM: 1.3 MMOL/L

## 2022-12-16 DIAGNOSIS — E83.52 HYPERCALCEMIA: Primary | ICD-10-CM

## 2022-12-30 DIAGNOSIS — G43.001 MIGRAINE WITHOUT AURA AND WITH STATUS MIGRAINOSUS, NOT INTRACTABLE: ICD-10-CM

## 2022-12-30 RX ORDER — AMITRIPTYLINE HYDROCHLORIDE 25 MG/1
TABLET, FILM COATED ORAL
Qty: 90 TABLET | Refills: 0 | Status: SHIPPED | OUTPATIENT
Start: 2022-12-30

## 2022-12-30 NOTE — TELEPHONE ENCOUNTER
Patient calling, scheduled appointment for follow up with Melita Mckeon. Requesting refill on:    butalbital-acetaminophen-caffeine -40 MG Oral Tab    Because she only has a few pills left.     Please advise and send to:    [facility] [address] [address] [address] [place] [address], [phone]

## 2023-01-03 ENCOUNTER — OFFICE VISIT (OUTPATIENT)
Dept: FAMILY MEDICINE CLINIC | Facility: CLINIC | Age: 63
End: 2023-01-03
Payer: COMMERCIAL

## 2023-01-03 VITALS
HEIGHT: 62 IN | SYSTOLIC BLOOD PRESSURE: 128 MMHG | BODY MASS INDEX: 28.34 KG/M2 | RESPIRATION RATE: 14 BRPM | DIASTOLIC BLOOD PRESSURE: 82 MMHG | HEART RATE: 84 BPM | TEMPERATURE: 97 F | WEIGHT: 154 LBS

## 2023-01-03 DIAGNOSIS — I10 UNCONTROLLED HYPERTENSION: Primary | ICD-10-CM

## 2023-01-03 PROCEDURE — 3074F SYST BP LT 130 MM HG: CPT | Performed by: FAMILY MEDICINE

## 2023-01-03 PROCEDURE — 3008F BODY MASS INDEX DOCD: CPT | Performed by: FAMILY MEDICINE

## 2023-01-03 PROCEDURE — 3079F DIAST BP 80-89 MM HG: CPT | Performed by: FAMILY MEDICINE

## 2023-01-03 PROCEDURE — 99214 OFFICE O/P EST MOD 30 MIN: CPT | Performed by: FAMILY MEDICINE

## 2023-01-03 RX ORDER — VALSARTAN 160 MG/1
160 TABLET ORAL DAILY
Qty: 90 TABLET | Refills: 0 | Status: SHIPPED | OUTPATIENT
Start: 2023-01-03

## 2023-01-05 RX ORDER — BUTALBITAL, ACETAMINOPHEN AND CAFFEINE 50; 325; 40 MG/1; MG/1; MG/1
TABLET ORAL
Qty: 30 TABLET | Refills: 0 | Status: SHIPPED | OUTPATIENT
Start: 2023-01-05

## 2023-02-03 DIAGNOSIS — G43.001 MIGRAINE WITHOUT AURA AND WITH STATUS MIGRAINOSUS, NOT INTRACTABLE: ICD-10-CM

## 2023-02-06 RX ORDER — AMITRIPTYLINE HYDROCHLORIDE 25 MG/1
TABLET, FILM COATED ORAL
Qty: 90 TABLET | Refills: 3 | Status: SHIPPED | OUTPATIENT
Start: 2023-02-06

## 2023-02-06 RX ORDER — BUTALBITAL, ACETAMINOPHEN AND CAFFEINE 50; 325; 40 MG/1; MG/1; MG/1
TABLET ORAL
Qty: 30 TABLET | Refills: 5 | Status: SHIPPED | OUTPATIENT
Start: 2023-02-06

## 2023-02-15 ENCOUNTER — OFFICE VISIT (OUTPATIENT)
Dept: NEUROLOGY | Facility: CLINIC | Age: 63
End: 2023-02-15
Payer: COMMERCIAL

## 2023-02-15 VITALS
SYSTOLIC BLOOD PRESSURE: 131 MMHG | BODY MASS INDEX: 28.52 KG/M2 | HEIGHT: 62 IN | WEIGHT: 155 LBS | HEART RATE: 72 BPM | DIASTOLIC BLOOD PRESSURE: 73 MMHG | RESPIRATION RATE: 16 BRPM

## 2023-02-15 DIAGNOSIS — G43.001 MIGRAINE WITHOUT AURA AND WITH STATUS MIGRAINOSUS, NOT INTRACTABLE: Primary | ICD-10-CM

## 2023-02-15 PROCEDURE — 99213 OFFICE O/P EST LOW 20 MIN: CPT | Performed by: PHYSICIAN ASSISTANT

## 2023-02-15 PROCEDURE — 3075F SYST BP GE 130 - 139MM HG: CPT | Performed by: PHYSICIAN ASSISTANT

## 2023-02-15 PROCEDURE — 3008F BODY MASS INDEX DOCD: CPT | Performed by: PHYSICIAN ASSISTANT

## 2023-02-15 PROCEDURE — 3078F DIAST BP <80 MM HG: CPT | Performed by: PHYSICIAN ASSISTANT

## 2023-02-28 RX ORDER — AMLODIPINE BESYLATE 10 MG/1
TABLET ORAL
Qty: 90 TABLET | Refills: 0 | OUTPATIENT
Start: 2023-02-28

## 2023-02-28 RX ORDER — VALSARTAN 160 MG/1
TABLET ORAL
Qty: 90 TABLET | Refills: 0 | OUTPATIENT
Start: 2023-02-28

## 2023-03-23 ENCOUNTER — OFFICE VISIT (OUTPATIENT)
Dept: FAMILY MEDICINE CLINIC | Facility: CLINIC | Age: 63
End: 2023-03-23
Payer: COMMERCIAL

## 2023-03-23 VITALS
RESPIRATION RATE: 16 BRPM | DIASTOLIC BLOOD PRESSURE: 72 MMHG | HEIGHT: 62 IN | HEART RATE: 80 BPM | SYSTOLIC BLOOD PRESSURE: 116 MMHG | BODY MASS INDEX: 28.86 KG/M2 | WEIGHT: 156.81 LBS | TEMPERATURE: 98 F

## 2023-03-23 DIAGNOSIS — Z00.00 LABORATORY EXAMINATION ORDERED AS PART OF A ROUTINE GENERAL MEDICAL EXAMINATION: ICD-10-CM

## 2023-03-23 DIAGNOSIS — Z12.31 ENCOUNTER FOR SCREENING MAMMOGRAM FOR MALIGNANT NEOPLASM OF BREAST: ICD-10-CM

## 2023-03-23 DIAGNOSIS — Z00.00 ROUTINE GENERAL MEDICAL EXAMINATION AT A HEALTH CARE FACILITY: Primary | ICD-10-CM

## 2023-03-23 DIAGNOSIS — Z13.89 SCREENING FOR GENITOURINARY CONDITION: ICD-10-CM

## 2023-03-23 DIAGNOSIS — E04.1 THYROID NODULE: ICD-10-CM

## 2023-03-23 DIAGNOSIS — Z12.4 PAPANICOLAOU SMEAR FOR CERVICAL CANCER SCREENING: ICD-10-CM

## 2023-03-23 PROCEDURE — 3074F SYST BP LT 130 MM HG: CPT | Performed by: FAMILY MEDICINE

## 2023-03-23 PROCEDURE — 3078F DIAST BP <80 MM HG: CPT | Performed by: FAMILY MEDICINE

## 2023-03-23 PROCEDURE — 3008F BODY MASS INDEX DOCD: CPT | Performed by: FAMILY MEDICINE

## 2023-03-23 PROCEDURE — 99396 PREV VISIT EST AGE 40-64: CPT | Performed by: FAMILY MEDICINE

## 2023-03-23 RX ORDER — VALSARTAN AND HYDROCHLOROTHIAZIDE 160; 25 MG/1; MG/1
1 TABLET ORAL DAILY
Qty: 90 TABLET | Refills: 1 | Status: SHIPPED | OUTPATIENT
Start: 2023-03-23 | End: 2024-03-17

## 2023-03-23 RX ORDER — ATORVASTATIN CALCIUM 10 MG/1
10 TABLET, FILM COATED ORAL DAILY
Qty: 90 TABLET | Refills: 1 | Status: SHIPPED | OUTPATIENT
Start: 2023-03-23

## 2023-03-23 RX ORDER — VALSARTAN 160 MG/1
160 TABLET ORAL DAILY
Qty: 90 TABLET | Refills: 0 | Status: CANCELLED | OUTPATIENT
Start: 2023-03-23

## 2023-03-23 RX ORDER — FLUOXETINE HYDROCHLORIDE 20 MG/1
20 CAPSULE ORAL DAILY
Qty: 90 CAPSULE | Refills: 1 | Status: SHIPPED | OUTPATIENT
Start: 2023-03-23

## 2023-03-23 RX ORDER — OMEGA-3-ACID ETHYL ESTERS 1 G/1
4 CAPSULE, LIQUID FILLED ORAL DAILY
Qty: 360 CAPSULE | Refills: 3 | Status: SHIPPED | OUTPATIENT
Start: 2023-03-23

## 2023-03-23 RX ORDER — HYDROCHLOROTHIAZIDE 25 MG/1
25 TABLET ORAL DAILY
COMMUNITY
Start: 2023-03-05 | End: 2023-03-23

## 2023-03-23 RX ORDER — AMLODIPINE BESYLATE 10 MG/1
10 TABLET ORAL DAILY
Qty: 90 TABLET | Refills: 1 | Status: SHIPPED | OUTPATIENT
Start: 2023-03-23

## 2023-03-23 RX ORDER — LEVOTHYROXINE SODIUM 0.1 MG/1
100 TABLET ORAL DAILY
Qty: 90 TABLET | Refills: 1 | Status: SHIPPED | OUTPATIENT
Start: 2023-03-23

## 2023-03-23 NOTE — PATIENT INSTRUCTIONS
Check on insurance coverage for Shingrix. If covered, then ask your insurance if you should do it at the [address] [place] [address] office or pharmacy. We do currently have it available in our office. If your current insurance doesn't cover it, Medicare does cover when you change to Medicare. Check on insurance coverage for Tdap (tetanus, diptheria and whooping cough). If covered, then ask your insurance if you should do it at the [address] [place] [address] office or pharmacy. We do currently have it available in our office. Medicare does not cover this vaccine. Stop valsartan and stop hydrochlorothiazide that are separate once you use them up. The new prescription will put them both together in the same pill.

## 2023-03-29 ENCOUNTER — NURSE ONLY (OUTPATIENT)
Dept: HEMATOLOGY/ONCOLOGY | Facility: HOSPITAL | Age: 63
End: 2023-03-29
Attending: INTERNAL MEDICINE
Payer: COMMERCIAL

## 2023-03-29 DIAGNOSIS — D47.2 MONOCLONAL GAMMOPATHY: ICD-10-CM

## 2023-03-29 DIAGNOSIS — Z00.00 LABORATORY EXAMINATION ORDERED AS PART OF A ROUTINE GENERAL MEDICAL EXAMINATION: ICD-10-CM

## 2023-03-29 DIAGNOSIS — Z13.89 SCREENING FOR GENITOURINARY CONDITION: ICD-10-CM

## 2023-03-29 DIAGNOSIS — E04.1 THYROID NODULE: ICD-10-CM

## 2023-03-29 LAB
ALBUMIN SERPL-MCNC: 3.7 G/DL (ref 3.4–5)
ALBUMIN/GLOB SERPL: 0.8 {RATIO} (ref 1–2)
ALP LIVER SERPL-CCNC: 112 U/L
ALT SERPL-CCNC: 35 U/L
ANION GAP SERPL CALC-SCNC: 4 MMOL/L (ref 0–18)
AST SERPL-CCNC: 30 U/L (ref 15–37)
BASOPHILS # BLD AUTO: 0.05 X10(3) UL (ref 0–0.2)
BASOPHILS NFR BLD AUTO: 0.6 %
BILIRUB SERPL-MCNC: 0.3 MG/DL (ref 0.1–2)
BILIRUB UR QL STRIP.AUTO: NEGATIVE
BUN BLD-MCNC: 23 MG/DL (ref 7–18)
CALCIUM BLD-MCNC: 10.1 MG/DL (ref 8.5–10.1)
CHLORIDE SERPL-SCNC: 108 MMOL/L (ref 98–112)
CHOLEST SERPL-MCNC: 149 MG/DL (ref ?–200)
CO2 SERPL-SCNC: 26 MMOL/L (ref 21–32)
COLOR UR AUTO: YELLOW
CREAT BLD-MCNC: 1.13 MG/DL
EOSINOPHIL # BLD AUTO: 0.12 X10(3) UL (ref 0–0.7)
EOSINOPHIL NFR BLD AUTO: 1.5 %
ERYTHROCYTE [DISTWIDTH] IN BLOOD BY AUTOMATED COUNT: 12.1 %
FASTING PATIENT LIPID ANSWER: YES
FASTING STATUS PATIENT QL REPORTED: YES
GFR SERPLBLD BASED ON 1.73 SQ M-ARVRAT: 54 ML/MIN/1.73M2 (ref 60–?)
GLOBULIN PLAS-MCNC: 4.7 G/DL (ref 2.8–4.4)
GLUCOSE BLD-MCNC: 127 MG/DL (ref 70–99)
GLUCOSE UR STRIP.AUTO-MCNC: NEGATIVE MG/DL
HCT VFR BLD AUTO: 39.5 %
HDLC SERPL-MCNC: 47 MG/DL (ref 40–59)
HGB BLD-MCNC: 13.6 G/DL
IGA SERPL-MCNC: 99.2 MG/DL (ref 70–312)
IGM SERPL-MCNC: 98.6 MG/DL (ref 43–279)
IMM GRANULOCYTES # BLD AUTO: 0.02 X10(3) UL (ref 0–1)
IMM GRANULOCYTES NFR BLD: 0.3 %
IMMUNOGLOBULIN PNL SER-MCNC: 1740 MG/DL (ref 791–1643)
KETONES UR STRIP.AUTO-MCNC: NEGATIVE MG/DL
LDH SERPL L TO P-CCNC: 205 U/L
LDLC SERPL CALC-MCNC: 59 MG/DL (ref ?–100)
LEUKOCYTE ESTERASE UR QL STRIP.AUTO: NEGATIVE
LYMPHOCYTES # BLD AUTO: 3.05 X10(3) UL (ref 1–4)
LYMPHOCYTES NFR BLD AUTO: 39.1 %
MCH RBC QN AUTO: 31.1 PG (ref 26–34)
MCHC RBC AUTO-ENTMCNC: 34.4 G/DL (ref 31–37)
MCV RBC AUTO: 90.2 FL
MONOCYTES # BLD AUTO: 0.51 X10(3) UL (ref 0.1–1)
MONOCYTES NFR BLD AUTO: 6.5 %
NEUTROPHILS # BLD AUTO: 4.05 X10 (3) UL (ref 1.5–7.7)
NEUTROPHILS # BLD AUTO: 4.05 X10(3) UL (ref 1.5–7.7)
NEUTROPHILS NFR BLD AUTO: 52 %
NITRITE UR QL STRIP.AUTO: NEGATIVE
NONHDLC SERPL-MCNC: 102 MG/DL (ref ?–130)
OSMOLALITY SERPL CALC.SUM OF ELEC: 291 MOSM/KG (ref 275–295)
PH UR STRIP.AUTO: 5 [PH] (ref 5–8)
PLATELET # BLD AUTO: 275 10(3)UL (ref 150–450)
POTASSIUM SERPL-SCNC: 3.7 MMOL/L (ref 3.5–5.1)
PROT SERPL-MCNC: 8.4 G/DL (ref 6.4–8.2)
PROT UR STRIP.AUTO-MCNC: NEGATIVE MG/DL
RBC # BLD AUTO: 4.38 X10(6)UL
RBC UR QL AUTO: NEGATIVE
SODIUM SERPL-SCNC: 138 MMOL/L (ref 136–145)
SP GR UR STRIP.AUTO: 1.02 (ref 1–1.03)
T4 FREE SERPL-MCNC: 1 NG/DL (ref 0.8–1.7)
TRIGL SERPL-MCNC: 270 MG/DL (ref 30–149)
TSI SER-ACNC: 2.59 MIU/ML (ref 0.36–3.74)
UROBILINOGEN UR STRIP.AUTO-MCNC: <2 MG/DL
VLDLC SERPL CALC-MCNC: 40 MG/DL (ref 0–30)
WBC # BLD AUTO: 7.8 X10(3) UL (ref 4–11)

## 2023-03-29 PROCEDURE — 83615 LACTATE (LD) (LDH) ENZYME: CPT

## 2023-03-29 PROCEDURE — 84165 PROTEIN E-PHORESIS SERUM: CPT

## 2023-03-29 PROCEDURE — 86334 IMMUNOFIX E-PHORESIS SERUM: CPT

## 2023-03-29 PROCEDURE — 82784 ASSAY IGA/IGD/IGG/IGM EACH: CPT

## 2023-03-29 PROCEDURE — 80053 COMPREHEN METABOLIC PANEL: CPT

## 2023-03-29 PROCEDURE — 80061 LIPID PANEL: CPT

## 2023-03-29 PROCEDURE — 81001 URINALYSIS AUTO W/SCOPE: CPT

## 2023-03-29 PROCEDURE — 84439 ASSAY OF FREE THYROXINE: CPT

## 2023-03-29 PROCEDURE — 83521 IG LIGHT CHAINS FREE EACH: CPT

## 2023-03-29 PROCEDURE — 84443 ASSAY THYROID STIM HORMONE: CPT

## 2023-03-29 PROCEDURE — 36415 COLL VENOUS BLD VENIPUNCTURE: CPT

## 2023-03-29 PROCEDURE — 85025 COMPLETE CBC W/AUTO DIFF WBC: CPT

## 2023-03-30 RX ORDER — VALSARTAN 160 MG/1
TABLET ORAL
Qty: 90 TABLET | Refills: 0 | OUTPATIENT
Start: 2023-03-30

## 2023-03-31 LAB
ALBUMIN SERPL ELPH-MCNC: 4.26 G/DL (ref 3.75–5.21)
ALBUMIN/GLOB SERPL: 1.14 {RATIO} (ref 1–2)
ALPHA1 GLOB SERPL ELPH-MCNC: 0.32 G/DL (ref 0.19–0.46)
ALPHA2 GLOB SERPL ELPH-MCNC: 0.96 G/DL (ref 0.48–1.05)
B-GLOBULIN SERPL ELPH-MCNC: 0.78 G/DL (ref 0.68–1.23)
GAMMA GLOB SERPL ELPH-MCNC: 1.67 G/DL (ref 0.62–1.7)
KAPPA LC FREE SER-MCNC: 2.46 MG/DL (ref 0.33–1.94)
KAPPA LC FREE/LAMBDA FREE SER NEPH: 1.49 {RATIO} (ref 0.26–1.65)
LAMBDA LC FREE SERPL-MCNC: 1.65 MG/DL (ref 0.57–2.63)
M PROTEIN 1 SERPL ELPH-MCNC: 1.09 G/DL (ref ?–0)
PROT SERPL-MCNC: 8 G/DL (ref 6.4–8.2)

## 2023-04-06 ENCOUNTER — HOSPITAL ENCOUNTER (OUTPATIENT)
Dept: ULTRASOUND IMAGING | Age: 63
Discharge: HOME | End: 2023-04-06
Attending: FAMILY MEDICINE
Payer: COMMERCIAL

## 2023-04-06 ENCOUNTER — APPOINTMENT (OUTPATIENT)
Dept: HEMATOLOGY/ONCOLOGY | Facility: HOSPITAL | Age: 63
End: 2023-04-06
Attending: INTERNAL MEDICINE
Payer: COMMERCIAL

## 2023-04-06 ENCOUNTER — HOSPITAL ENCOUNTER (OUTPATIENT)
Dept: MAMMOGRAPHY | Age: 63
Discharge: HOME | End: 2023-04-06
Attending: FAMILY MEDICINE
Payer: COMMERCIAL

## 2023-04-06 DIAGNOSIS — Z12.31 ENCOUNTER FOR SCREENING MAMMOGRAM FOR MALIGNANT NEOPLASM OF BREAST: ICD-10-CM

## 2023-04-06 DIAGNOSIS — E04.1 THYROID NODULE: ICD-10-CM

## 2023-04-06 PROCEDURE — 76536 US EXAM OF HEAD AND NECK: CPT | Performed by: FAMILY MEDICINE

## 2023-04-06 PROCEDURE — 77063 BREAST TOMOSYNTHESIS BI: CPT | Performed by: FAMILY MEDICINE

## 2023-04-06 PROCEDURE — 77067 SCR MAMMO BI INCL CAD: CPT | Performed by: FAMILY MEDICINE

## 2023-04-10 ENCOUNTER — OFFICE VISIT (OUTPATIENT)
Dept: HEMATOLOGY/ONCOLOGY | Facility: HOSPITAL | Age: 63
End: 2023-04-10
Attending: INTERNAL MEDICINE
Payer: COMMERCIAL

## 2023-04-10 VITALS
WEIGHT: 151 LBS | TEMPERATURE: 98 F | BODY MASS INDEX: 28 KG/M2 | DIASTOLIC BLOOD PRESSURE: 83 MMHG | OXYGEN SATURATION: 97 % | HEART RATE: 92 BPM | RESPIRATION RATE: 18 BRPM | SYSTOLIC BLOOD PRESSURE: 181 MMHG

## 2023-04-10 DIAGNOSIS — D47.2 MONOCLONAL GAMMOPATHY: Primary | ICD-10-CM

## 2023-04-10 PROCEDURE — 99213 OFFICE O/P EST LOW 20 MIN: CPT | Performed by: INTERNAL MEDICINE

## 2023-04-10 NOTE — PROGRESS NOTES
Patient is here for follow up for monoclonal gammopathy. She is feeling well. She denies any fever, cough or shortness of breath. Her bowel function is normal.   She denies any pain. She had labs on 3/29/2023.      Education Record    Learner:  Patient    Disease / Diagnosis: monoclonal gammopathy    Barriers / Limitations:  None   Comments:    Method:  Discussion   Comments:    General Topics:  Side effects and symptom management   Comments:    Outcome:  Shows understanding   Comments:

## 2023-04-11 LAB — HPV I/H RISK 1 DNA SPEC QL NAA+PROBE: NEGATIVE

## 2023-07-14 ENCOUNTER — SOCIAL WORK SERVICES (OUTPATIENT)
Dept: HEMATOLOGY/ONCOLOGY | Facility: HOSPITAL | Age: 63
End: 2023-07-14

## 2023-07-14 NOTE — PROGRESS NOTES
met with patient and completed Power of  for Healthcare, appointing  Mickey Cartagena as Hernan Holloway. Copies provided to patient and given to [facility] to enter into Chart.

## 2023-07-28 RX ORDER — OMEPRAZOLE 40 MG/1
40 CAPSULE, DELAYED RELEASE ORAL DAILY
Qty: 90 CAPSULE | Refills: 0 | Status: SHIPPED | OUTPATIENT
Start: 2023-07-28

## 2023-07-28 RX ORDER — AMLODIPINE BESYLATE 10 MG/1
10 TABLET ORAL DAILY
Qty: 90 TABLET | Refills: 0 | Status: SHIPPED | OUTPATIENT
Start: 2023-07-28

## 2023-07-28 RX ORDER — VALSARTAN AND HYDROCHLOROTHIAZIDE 160; 25 MG/1; MG/1
1 TABLET ORAL DAILY
Qty: 90 TABLET | Refills: 0 | Status: SHIPPED | OUTPATIENT
Start: 2023-07-28

## 2023-08-25 RX ORDER — OMEPRAZOLE 40 MG/1
40 CAPSULE, DELAYED RELEASE ORAL DAILY
Qty: 90 CAPSULE | Refills: 0 | OUTPATIENT
Start: 2023-08-25

## 2023-08-25 NOTE — TELEPHONE ENCOUNTER
LOV:  03/23/2023 for: CPX  Patient advised to RTC on:  6 months. Nov:09/25/2023  Medication Quantity Refills Start End   Omeprazole 40 MG Oral Capsule Delayed Release 90 capsule 0 7/28/2023    Sig:   Take 1 capsule (40 mg total) by mouth daily.

## 2023-09-25 ENCOUNTER — LAB ENCOUNTER (OUTPATIENT)
Dept: LAB | Age: 63
End: 2023-09-25
Attending: FAMILY MEDICINE
Payer: COMMERCIAL

## 2023-09-25 ENCOUNTER — OFFICE VISIT (OUTPATIENT)
Dept: FAMILY MEDICINE CLINIC | Facility: CLINIC | Age: 63
End: 2023-09-25
Payer: COMMERCIAL

## 2023-09-25 VITALS
SYSTOLIC BLOOD PRESSURE: 128 MMHG | HEIGHT: 62 IN | DIASTOLIC BLOOD PRESSURE: 60 MMHG | HEART RATE: 72 BPM | BODY MASS INDEX: 29.08 KG/M2 | TEMPERATURE: 98 F | WEIGHT: 158 LBS

## 2023-09-25 DIAGNOSIS — E74.39 GLUCOSE INTOLERANCE: ICD-10-CM

## 2023-09-25 DIAGNOSIS — I10 BENIGN ESSENTIAL HYPERTENSION: ICD-10-CM

## 2023-09-25 DIAGNOSIS — I10 BENIGN ESSENTIAL HYPERTENSION: Primary | ICD-10-CM

## 2023-09-25 DIAGNOSIS — R00.2 PALPITATIONS: ICD-10-CM

## 2023-09-25 DIAGNOSIS — E78.5 HYPERLIPIDEMIA, UNSPECIFIED HYPERLIPIDEMIA TYPE: ICD-10-CM

## 2023-09-25 DIAGNOSIS — Z23 NEED FOR VACCINATION: ICD-10-CM

## 2023-09-25 DIAGNOSIS — E83.52 HYPERCALCEMIA: ICD-10-CM

## 2023-09-25 LAB
ALBUMIN SERPL-MCNC: 4.1 G/DL (ref 3.4–5)
ALBUMIN/GLOB SERPL: 0.9 {RATIO} (ref 1–2)
ALP LIVER SERPL-CCNC: 110 U/L
ALT SERPL-CCNC: 41 U/L
ANION GAP SERPL CALC-SCNC: 4 MMOL/L (ref 0–18)
AST SERPL-CCNC: 27 U/L (ref 15–37)
BILIRUB SERPL-MCNC: 0.3 MG/DL (ref 0.1–2)
BUN BLD-MCNC: 22 MG/DL (ref 7–18)
CALCIUM BLD-MCNC: 10.4 MG/DL (ref 8.5–10.1)
CHLORIDE SERPL-SCNC: 106 MMOL/L (ref 98–112)
CHOLEST SERPL-MCNC: 159 MG/DL (ref ?–200)
CO2 SERPL-SCNC: 28 MMOL/L (ref 21–32)
CREAT BLD-MCNC: 1.26 MG/DL
EGFRCR SERPLBLD CKD-EPI 2021: 48 ML/MIN/1.73M2 (ref 60–?)
EST. AVERAGE GLUCOSE BLD GHB EST-MCNC: 134 MG/DL (ref 68–126)
FASTING PATIENT LIPID ANSWER: YES
FASTING STATUS PATIENT QL REPORTED: YES
GLOBULIN PLAS-MCNC: 4.8 G/DL (ref 2.8–4.4)
GLUCOSE BLD-MCNC: 108 MG/DL (ref 70–99)
HBA1C MFR BLD: 6.3 % (ref ?–5.7)
HDLC SERPL-MCNC: 55 MG/DL (ref 40–59)
LDLC SERPL CALC-MCNC: 81 MG/DL (ref ?–100)
NONHDLC SERPL-MCNC: 104 MG/DL (ref ?–130)
OSMOLALITY SERPL CALC.SUM OF ELEC: 290 MOSM/KG (ref 275–295)
POTASSIUM SERPL-SCNC: 3.3 MMOL/L (ref 3.5–5.1)
PROT SERPL-MCNC: 8.9 G/DL (ref 6.4–8.2)
SODIUM SERPL-SCNC: 138 MMOL/L (ref 136–145)
TRIGL SERPL-MCNC: 134 MG/DL (ref 30–149)
VLDLC SERPL CALC-MCNC: 21 MG/DL (ref 0–30)

## 2023-09-25 PROCEDURE — 83036 HEMOGLOBIN GLYCOSYLATED A1C: CPT

## 2023-09-25 PROCEDURE — 99214 OFFICE O/P EST MOD 30 MIN: CPT | Performed by: FAMILY MEDICINE

## 2023-09-25 PROCEDURE — 3074F SYST BP LT 130 MM HG: CPT | Performed by: FAMILY MEDICINE

## 2023-09-25 PROCEDURE — 90686 IIV4 VACC NO PRSV 0.5 ML IM: CPT | Performed by: FAMILY MEDICINE

## 2023-09-25 PROCEDURE — 3008F BODY MASS INDEX DOCD: CPT | Performed by: FAMILY MEDICINE

## 2023-09-25 PROCEDURE — 80061 LIPID PANEL: CPT

## 2023-09-25 PROCEDURE — 3078F DIAST BP <80 MM HG: CPT | Performed by: FAMILY MEDICINE

## 2023-09-25 PROCEDURE — 80053 COMPREHEN METABOLIC PANEL: CPT

## 2023-09-25 PROCEDURE — 82330 ASSAY OF CALCIUM: CPT

## 2023-09-25 PROCEDURE — 90471 IMMUNIZATION ADMIN: CPT | Performed by: FAMILY MEDICINE

## 2023-09-25 RX ORDER — AMLODIPINE BESYLATE 10 MG/1
10 TABLET ORAL DAILY
Qty: 90 TABLET | Refills: 1 | Status: SHIPPED | OUTPATIENT
Start: 2023-09-25

## 2023-09-25 RX ORDER — ATORVASTATIN CALCIUM 10 MG/1
10 TABLET, FILM COATED ORAL DAILY
Qty: 90 TABLET | Refills: 1 | Status: SHIPPED | OUTPATIENT
Start: 2023-09-25

## 2023-09-25 RX ORDER — VALSARTAN AND HYDROCHLOROTHIAZIDE 160; 25 MG/1; MG/1
1 TABLET ORAL DAILY
Qty: 90 TABLET | Refills: 1 | Status: SHIPPED | OUTPATIENT
Start: 2023-09-25

## 2023-09-25 RX ORDER — FLUOXETINE HYDROCHLORIDE 20 MG/1
20 CAPSULE ORAL DAILY
Qty: 90 CAPSULE | Refills: 1 | Status: SHIPPED | OUTPATIENT
Start: 2023-09-25

## 2023-09-26 DIAGNOSIS — E87.5 SERUM POTASSIUM ELEVATED: Primary | ICD-10-CM

## 2023-09-26 LAB — LC CALCIUM, IONIZED: 5.3 MG/DL

## 2023-10-16 ENCOUNTER — TELEPHONE (OUTPATIENT)
Dept: FAMILY MEDICINE CLINIC | Facility: CLINIC | Age: 63
End: 2023-10-16

## 2023-10-16 NOTE — TELEPHONE ENCOUNTER
Pt requesting call back from Williams Starr regarding:    -Blood work results     -Pt states that Williams Starr referred her to a cardiologist but provider was booking until mid November. Pt went with a different doctor- Dr. Isaac Alaniz and has appt scheduled for 10/17 at 3:30. Pt would like to know if this is okay. I informed pt that typically nurses call back and she stated that she would like to speak with Williams Starr directly if possible.

## 2023-10-16 NOTE — TELEPHONE ENCOUNTER
S/w pt. Advised of OK to keep apt with cardio as below. Questions answered regarding blood work. Reminded her to obtain her repeat K+ level. She voiced understanding.

## 2023-10-18 ENCOUNTER — LAB ENCOUNTER (OUTPATIENT)
Dept: LAB | Facility: HOSPITAL | Age: 63
End: 2023-10-18
Attending: FAMILY MEDICINE
Payer: COMMERCIAL

## 2023-10-18 DIAGNOSIS — E87.5 SERUM POTASSIUM ELEVATED: ICD-10-CM

## 2023-10-18 LAB — POTASSIUM SERPL-SCNC: 3.8 MMOL/L (ref 3.5–5.1)

## 2023-10-18 PROCEDURE — 84132 ASSAY OF SERUM POTASSIUM: CPT

## 2023-10-18 PROCEDURE — 36415 COLL VENOUS BLD VENIPUNCTURE: CPT

## 2023-10-23 RX ORDER — OMEPRAZOLE 40 MG/1
40 CAPSULE, DELAYED RELEASE ORAL DAILY
Qty: 90 CAPSULE | Refills: 1 | Status: SHIPPED | OUTPATIENT
Start: 2023-10-23

## 2023-10-23 NOTE — TELEPHONE ENCOUNTER
LOV: 09/25/2023  for: Medication Follow-Up   Patient advised to RTC on: Follow-up for complete physical.   Nov:04/14/2023      Medication Quantity Refills Start End   Omeprazole 40 MG Oral Capsule Delayed Release 90 capsule 0 7/28/2023    Sig:   Take 1 capsule (40 mg total) by mouth daily.

## 2023-10-31 ENCOUNTER — TELEPHONE (OUTPATIENT)
Dept: FAMILY MEDICINE CLINIC | Facility: CLINIC | Age: 63
End: 2023-10-31

## 2023-10-31 DIAGNOSIS — K21.9 CHRONIC GERD: Primary | ICD-10-CM

## 2023-10-31 RX ORDER — OMEPRAZOLE 20 MG/1
20 CAPSULE, DELAYED RELEASE ORAL
Qty: 30 CAPSULE | Refills: 0 | Status: SHIPPED | OUTPATIENT
Start: 2023-10-31

## 2023-10-31 NOTE — TELEPHONE ENCOUNTER
If her GERD symptoms are well controlled, we could decrease omeprazole to 20 mg daily for 4 weeks then update. If doing well after 4 weeks, we could start slowly decreasing the omeprazole after adding Pepcid (different class than the omeprazole). We have to wean off omeprazole slowly to avoid rebound acid production. If her symptoms return while weaning, we should go back to the lowest effective dose of omeprazole since uncontrolled GERD can contribute to the formation of Stearns's esophagus. If she has never had an EGD with Dr. Kai House, I'd recommend referral to her for GERD and she will likely recommend EGD. Proton pump inhibitors can cause side effects long term but if GERD isn't controlled with Pepcid, we'd recommend going back to the proton pump inhibitor to make sure controlled (and also talk to GI about EGD if not done previously).

## 2023-10-31 NOTE — TELEPHONE ENCOUNTER
Pt states that pharmacy contacted her and told her that the rx that she is on she should not be taking for as long as she has been because if taken too long it can cause bad side effects. Pt calling to ask if she should stop taking it or if she should be prescribed something else.     Rx for:  Omeprazole 40 MG Oral Capsule Delayed Release

## 2023-10-31 NOTE — TELEPHONE ENCOUNTER
Called Pt and informed of Evans Mckay PA-C's rceommendations below. MyChart message about this, sent per patient request.    GI referral placed. Advised Pt to schedule an appointment and to discuss about EGD. Pt states GERD is well controlled. Omeprazole 20mg delayed release sent to pharmacy. Pt is aware that she needs to wean off Omeprazole and needs to give us an update in 4 weeks. Pt voiced understanding and agreed with plan of care.

## 2023-11-09 ENCOUNTER — TELEPHONE (OUTPATIENT)
Dept: FAMILY MEDICINE CLINIC | Facility: CLINIC | Age: 63
End: 2023-11-09

## 2023-11-09 RX ORDER — FAMOTIDINE 20 MG/1
20 TABLET, FILM COATED ORAL 2 TIMES DAILY
Qty: 180 TABLET | Refills: 0 | Status: SHIPPED | OUTPATIENT
Start: 2023-11-09 | End: 2024-02-07

## 2023-11-09 NOTE — TELEPHONE ENCOUNTER
Sorry, I thought we already discussed famotidine and weaning off of the omeprazole. If doing well so far with lower dose, start taking famotidine 20mg BID. Decrease omeprazole to every other day for 1-2 weeks (will be on the famotidine at the same time). Then every 3rd day for 1-2 weeks then stop the omeprazole. Continue on famotidine. If she does well on famotidine 20mg BID, we might be able to decrease to once daily eventually.

## 2023-11-09 NOTE — TELEPHONE ENCOUNTER
Pt states she is weaning off omeprazole 20 MG Oral Capsule Delayed Release. She will be done with current dose in about 20 days. Will she get lower dose or be done at that point. Please advise. Thank you.

## 2023-11-09 NOTE — TELEPHONE ENCOUNTER
I spoke to pt. She received a refill on the Omeprazole for 20 mg 1 daily on 10/31. She will be out of medication on 11/30. She is going to be out of town until 12/03. What dose should she take after the 30 days? She needs to know and will need an order because she will be out of town.

## 2023-11-09 NOTE — TELEPHONE ENCOUNTER
I called and gave the pt the information below from Al Bunch on how to wean off of the Omeprazole and transition to famotidine 20 mg bid. Rx for Famotidine sent to the [facility] pharmacy. Pt just started to wean off of the [address] Pt was instructed to not start this until after on the Omeprazole 20 mg 1 daily for 1 month. She will start the famotidine towards the end of the month to make sure she does not have any rebound reflux. Rx for famotidine sent to [facility].  Pt. Agreed to plan and verbalized understanding

## 2023-11-09 NOTE — TELEPHONE ENCOUNTER
See TE from 10/31/2023. Yes, we commonly wean the proton pump inhibitors to try to avoid rebound acid production. Once she is done with the weaning schedule, she can stop the omeprazole and continue with the H2 blocker. debrox drops b/l bid 5 drops at a time   pt should follow up with out pt ENT upon discharge.   Attending will staff pt in am Patient with SHAYLA on CKD in setting of hypotension, pneumonia and diuretic use. Latest Scr. is 3.24. Patient is non oliguric. Agree with holding diuretics for now. Obtain renal ultrasound to assess kidney size. Continue to monitor Scr, urine output, and electrolytes. Avoid nephrotoxins.

## 2023-11-10 RX ORDER — FAMOTIDINE 20 MG/1
20 TABLET, FILM COATED ORAL 2 TIMES DAILY
Qty: 180 TABLET | Refills: 0 | OUTPATIENT
Start: 2023-11-10

## 2023-11-10 RX ORDER — OMEPRAZOLE 20 MG/1
20 CAPSULE, DELAYED RELEASE ORAL
Qty: 30 CAPSULE | Refills: 0 | Status: SHIPPED | OUTPATIENT
Start: 2023-11-10

## 2023-11-10 RX ORDER — LEVOTHYROXINE SODIUM 0.1 MG/1
100 TABLET ORAL DAILY
Qty: 90 TABLET | Refills: 1 | Status: SHIPPED | OUTPATIENT
Start: 2023-11-10

## 2023-11-10 NOTE — TELEPHONE ENCOUNTER
LOV: 9/25/23  Next OV: 4/11/24  Last Refill:9/25/23  Medication Quantity Refills Start End   levothyroxine 100 MCG Oral Tab 90 tablet 1 3/23/2023    Sig:   Take 1 tablet (100 mcg total) by mouth daily. Medication Quantity Refills Start End   omeprazole 20 MG Oral Capsule Delayed Release 30 capsule 0 10/31/2023    Sig:   Take 1 capsule (20 mg total) by mouth every morning before         Please authorize if acceptable. Thank you!

## 2023-12-20 RX ORDER — OMEPRAZOLE 20 MG/1
20 CAPSULE, DELAYED RELEASE ORAL
Qty: 90 CAPSULE | Refills: 0 | Status: SHIPPED | OUTPATIENT
Start: 2023-12-20

## 2024-01-31 ENCOUNTER — TELEPHONE (OUTPATIENT)
Dept: FAMILY MEDICINE CLINIC | Facility: CLINIC | Age: 64
End: 2024-01-31

## 2024-01-31 NOTE — TELEPHONE ENCOUNTER
PT has endoscopy coming up and they are refusing procedure jimmy to office visit not on 9/25/2023.  Heart holter suggested, she was having anxiety, heart checked and no problems.  Pls advise PT when corrrected. TY

## 2024-01-31 NOTE — TELEPHONE ENCOUNTER
S/W pt and she states she went to get her endoscopy Monday and was refused.    Per pt the Anesthesiologist noticed that she has an order for a Holter monitor for palpitations and is concern because of it.    Per pt she has not had any palpitations since her visit in September and states she also had other heart tests and had come back normal.  States she is now retired and her stress level has gone down ever since.    Pt is requesting to have the order removed.  States she was advised by a Nurse from endoscopy to call our office and to have it removed.

## 2024-02-05 PROBLEM — R13.10 DYSPHAGIA, UNSPECIFIED: Status: ACTIVE | Noted: 2024-02-05

## 2024-02-05 PROBLEM — R10.13 ABDOMINAL PAIN, EPIGASTRIC: Status: ACTIVE | Noted: 2024-02-05

## 2024-02-05 RX ORDER — FAMOTIDINE 20 MG/1
20 TABLET, FILM COATED ORAL 2 TIMES DAILY
Qty: 180 TABLET | Refills: 0 | Status: SHIPPED | OUTPATIENT
Start: 2024-02-05

## 2024-03-06 ENCOUNTER — APPOINTMENT (OUTPATIENT)
Dept: HEMATOLOGY/ONCOLOGY | Facility: HOSPITAL | Age: 64
End: 2024-03-06
Attending: INTERNAL MEDICINE
Payer: MEDICARE

## 2024-03-18 ENCOUNTER — TELEPHONE (OUTPATIENT)
Dept: NEUROLOGY | Facility: CLINIC | Age: 64
End: 2024-03-18

## 2024-03-18 DIAGNOSIS — G43.001 MIGRAINE WITHOUT AURA AND WITH STATUS MIGRAINOSUS, NOT INTRACTABLE: ICD-10-CM

## 2024-03-18 RX ORDER — AMITRIPTYLINE HYDROCHLORIDE 25 MG/1
25 TABLET, FILM COATED ORAL NIGHTLY
Qty: 30 TABLET | Refills: 0 | Status: SHIPPED | OUTPATIENT
Start: 2024-03-18

## 2024-03-18 RX ORDER — OMEGA-3-ACID ETHYL ESTERS 1 G/1
4 CAPSULE, LIQUID FILLED ORAL DAILY
Qty: 360 CAPSULE | Refills: 0 | Status: SHIPPED | OUTPATIENT
Start: 2024-03-18

## 2024-03-18 NOTE — TELEPHONE ENCOUNTER
LOV: 09/25/2023    Last Refill:   Requested Prescriptions     Pending Prescriptions Disp Refills    OMEGA-3-ACID ETHYL ESTERS 1 g Oral Cap [Pharmacy Med Name: OMEGA-3 ETHYL ESTERS 1 GM CAP] 360 capsule 3     Sig: TAKE 4 CAPSULES (4 G TOTAL) BY MOUTH DAILY.       RTC: harinder ny 04/11/2024    Protocol: passed

## 2024-03-18 NOTE — TELEPHONE ENCOUNTER
ABBYM to call back to schedule appt with Dr. Chau or Sandra Harman, advised could also schedule via [address].

## 2024-04-11 ENCOUNTER — OFFICE VISIT (OUTPATIENT)
Dept: FAMILY MEDICINE CLINIC | Facility: CLINIC | Age: 64
End: 2024-04-11
Payer: MEDICARE

## 2024-04-11 VITALS
HEART RATE: 80 BPM | SYSTOLIC BLOOD PRESSURE: 124 MMHG | BODY MASS INDEX: 28.49 KG/M2 | WEIGHT: 156.81 LBS | HEIGHT: 62.01 IN | TEMPERATURE: 97 F | DIASTOLIC BLOOD PRESSURE: 78 MMHG | RESPIRATION RATE: 18 BRPM

## 2024-04-11 DIAGNOSIS — Z00.00 ENCOUNTER FOR ANNUAL HEALTH EXAMINATION: Primary | ICD-10-CM

## 2024-04-11 DIAGNOSIS — Z12.4 PAPANICOLAOU SMEAR FOR CERVICAL CANCER SCREENING: ICD-10-CM

## 2024-04-11 DIAGNOSIS — R63.5 WEIGHT GAIN: ICD-10-CM

## 2024-04-11 DIAGNOSIS — E04.1 THYROID NODULE: ICD-10-CM

## 2024-04-11 DIAGNOSIS — R73.09 ELEVATED HEMOGLOBIN A1C: ICD-10-CM

## 2024-04-11 DIAGNOSIS — Z12.31 ENCOUNTER FOR SCREENING MAMMOGRAM FOR MALIGNANT NEOPLASM OF BREAST: ICD-10-CM

## 2024-04-11 DIAGNOSIS — E78.5 HYPERLIPIDEMIA, UNSPECIFIED HYPERLIPIDEMIA TYPE: ICD-10-CM

## 2024-04-11 DIAGNOSIS — Z78.0 MENOPAUSE: ICD-10-CM

## 2024-04-11 DIAGNOSIS — I10 BENIGN ESSENTIAL HYPERTENSION: ICD-10-CM

## 2024-04-11 DIAGNOSIS — Z86.19 HISTORY OF HPV INFECTION: ICD-10-CM

## 2024-04-11 DIAGNOSIS — F43.21 GRIEF REACTION: ICD-10-CM

## 2024-04-11 DIAGNOSIS — Z87.410 HISTORY OF CERVICAL DYSPLASIA: ICD-10-CM

## 2024-04-11 DIAGNOSIS — E03.9 HYPOTHYROIDISM, UNSPECIFIED TYPE: ICD-10-CM

## 2024-04-11 DIAGNOSIS — E74.39 GLUCOSE INTOLERANCE: ICD-10-CM

## 2024-04-11 PROBLEM — R10.13 ABDOMINAL PAIN, EPIGASTRIC: Status: RESOLVED | Noted: 2024-02-05 | Resolved: 2024-04-11

## 2024-04-11 PROBLEM — D12.4 BENIGN NEOPLASM OF DESCENDING COLON: Status: RESOLVED | Noted: 2021-02-25 | Resolved: 2024-04-11

## 2024-04-11 LAB
ATRIAL RATE: 65 BPM
P AXIS: 57 DEGREES
P-R INTERVAL: 144 MS
Q-T INTERVAL: 412 MS
QRS DURATION: 100 MS
QTC CALCULATION (BEZET): 428 MS
R AXIS: 44 DEGREES
T AXIS: 37 DEGREES
VENTRICULAR RATE: 65 BPM

## 2024-04-11 PROCEDURE — 88175 CYTOPATH C/V AUTO FLUID REDO: CPT | Performed by: FAMILY MEDICINE

## 2024-04-11 PROCEDURE — 87624 HPV HI-RISK TYP POOLED RSLT: CPT | Performed by: FAMILY MEDICINE

## 2024-04-11 NOTE — PROGRESS NOTES
Subjective:   Che Cabrera is a 64 year old female who presents for a Medicare Initial Preventative Physical Exam (Welcome to Medicare- < 12 months on Medicare) and scheduled follow up of multiple significant but stable problems.     Last Pap 2023 - normal - no HPV.  History of cervical dysplasia.  Last DEXA 2021  Last colonoscopy 2021  Dr. Urias - hematology for MGUS  Dr. Houston - hepatology  Sandra Harman - neurology    Frustrated with weight gain.  Would like to consider medication.  Fluoxetine initially started for grief when her daughter passed away.  Doing well on medication.  Moods are good.  Motivation is good.    Compliant with hypertension medication.  Denies any chest pain or shortness of breath.  Compliant with her thyroid medication.  Due for ultrasound this year.  Compliant with her meds for hypercholesterolemia.  History/Other:   Fall Risk Assessment:   She has been screened for Falls and is low risk.      Cognitive Assessment:   Abnormal  What day of the week is this?: Correct  What month is it?: Correct  What year is it?: Correct  Recall \"Ball\": Correct  Recall \"Flag\": Incorrect  Recall \"Tree\": Correct      Functional Ability/Status:   Che Cabrera has a completely normal functional assessment. See flowsheet for details.      Depression Screening (PHQ-2/PHQ-9): PHQ-2 SCORE: 0  , done 4/11/2024        Advanced Directives:   She does NOT have a Living Will. [Do you have a living will?: No]  She has a Power of  for Health Care on file in [facility].  Discussed Advance Care Planning with patient (and family/surrogate if present). Standard forms made available to patient in After Visit Summary.      Patient Active Problem List   Diagnosis    Symptomatic menopausal or female climacteric states    Allergy, unspecified not elsewhere classified    HPV (human papilloma virus) infection    Grief reaction    Benign essential hypertension    Hypothyroidism    Migraine    Multinodular goiter     Monoclonal gammopathy    Osteopenia    Hyperlipidemia    Glucose intolerance    History of cervical dysplasia    Family history of colon cancer    Special screening for malignant neoplasm of colon    Dysphagia, unspecified     Allergies:  She is allergic to milk thistle and peanuts.    Current Medications:  Outpatient Medications Marked as Taking for the 4/11/24 encounter (Office Visit) with Dressler, Stephanie, PA-C   Medication Sig    omega-3-acid ethyl esters 1 g Oral Cap TAKE 4 CAPSULES (4 G TOTAL) BY MOUTH DAILY.    amitriptyline 25 MG Oral Tab Take 1 tablet (25 mg total) by mouth nightly.    FAMOTIDINE 20 MG Oral Tab TAKE 1 TABLET BY MOUTH TWICE A DAY    omeprazole 20 MG Oral Capsule Delayed Release Take 1 capsule (20 mg total) by mouth Before Dinner.    LEVOTHYROXINE 100 MCG Oral Tab TAKE 1 TABLET BY MOUTH EVERY DAY    amLODIPine 10 MG Oral Tab Take 1 tablet (10 mg total) by mouth daily.    atorvastatin 10 MG Oral Tab Take 1 tablet (10 mg total) by mouth daily.    FLUoxetine 20 MG Oral Cap Take 1 capsule (20 mg total) by mouth daily.    Valsartan-hydroCHLOROthiazide 160-25 MG Oral Tab Take 1 tablet by mouth daily.    butalbital-acetaminophen-caffeine -40 MG Oral Tab TAKE 1- 2 TABLETS BY MOUTH EVERY 6 HOURS AS NEEDED FOR HEADACHE, LIMIT USE TO 3 TIMES A WEEK    Multiple Vitamin (MULTIVITAMIN OR) Take  by mouth.    Cholecalciferol (VITAMIN D) 2000 UNIT Oral Tab Take by mouth. One 5,000 unit, one 2,000 unit daily     Calcium Carbonate-Vitamin D (CALCIUM + D OR) None Entered       Medical History:  She  has a past medical history of Arthritis, Decorative tattoo (1980), estrogen therapy (2008), colposcopy with cervical biopsy, HYPERTHYROIDISM, Kidney stone, Thyroid disease (Not sure), and Unspecified essential hypertension.  Surgical History:  She  has a past surgical history that includes appendectomy (11/20/2011); dexa, axial site (spine, hips, pelvis) (07/26/2010); colonoscopy (10/21/2010); [id number],  screening (10/2010); and pap smear (09/30/10).   Family History:  Her family history includes COPD in her mother; Cancer in her maternal grandmother; Heart Attack in her mother; Hypertension in her mother; MS in her mother; Stroke in her sister.  Social History:  She  reports that she quit smoking about 23 years ago. Her smoking use included cigarettes. She started smoking about 51 years ago. She has a 28 pack-year smoking history. She has never used smokeless tobacco. She reports current drug use. Drug: Cannabis. She reports that she does not drink alcohol.    Tobacco:  She smoked tobacco in the past but quit greater than 12 months ago.  Social History     Tobacco Use   Smoking Status Former    Current packs/day: 0.00    Average packs/day: 1 pack/day for 28.0 years (28.0 ttl pk-yrs)    Types: Cigarettes    Start date: 1972    Quit date: 2000    Years since quittin.6   Smokeless Tobacco Never   Tobacco Comments    Quit          CAGE Alcohol Screen:   CAGE screening score of 0 on 2024, showing low risk of alcohol abuse.      Patient Care Team:  Nahun Mcdonald MD as PCP - General  Sandra Harman PA as Consulting Physician (Physician Assistant)  Dressler, Stephanie, PA-C (Physician Assistant)  Belinda Villagomez APRN (Nurse Practitioner)    Review of Systems     Negative except as above    Objective:   Physical Exam  General Appearance:  Alert, cooperative, no distress, appears stated age   Head:  Normocephalic, without obvious abnormality, atraumatic   Eyes:  conjunctiva/corneas clear, EOM's intact both eyes   Ears:  Normal TM's and external ear canals, both ears       Throat: Lips, mucosa, and tongue normal; teeth and gums normal   Neck: Supple, symmetrical, trachea midline, no adenopathy;  thyroid: mildly prominent; no carotid bruit or JVD   Back:   No deformity   Lungs:   Clear to auscultation bilaterally, respirations unlabored   Heart:  Regular rate and rhythm, S1 and S2 normal, no murmur,  rub, or gallop   Abdomen:   Soft, non-tender, no masses, no organomegaly   Pelvic: See note below   Extremities: Extremities normal, atraumatic, no cyanosis or edema   Pulses: 2+ and symmetric   Skin: Skin color, texture, turgor normal, no rashes or lesions   Lymph nodes: Cervical, supraclavicular, and axillary nodes normal   Neurologic: Normal   BREASTS:  B/L breasts symmetric.  No tenderness. No nipple discharge.  No masses. No axillary lymphadenopathy.  ANUS & PERINEUM:  No visible rashes or lesions.  EXTERNAL GENITALIA:  Normal appearance. No lesions.  URETHRAL MEATUS:  Normal size. Normal location. No lesions. No prolapse.  URETHRA:  No palpable masses with bimanual exam. No tenderness.  BLADDER:  No fullness. Nontender. No masses.  VAGINA:  Mildly atrophic external vaginal area.  No discharge. No lesions. No cystocele. No rectocele. No prolapse noted on external exam.  CERVIX: Normal appearance.  No lesions. No discharge.  UTERUS:  Normal size without palpable fibroids. Normal position. Normal consistency. No evidence of prolapse.  ADNEXA: No masses. Nontender. No nodules. No enlargement.     /78   Pulse 80   Temp 97 °F (36.1 °C) (Temporal)   Resp 18   Ht 5' 2.01\" (1.575 m)   Wt 156 lb 12.8 oz (71.1 kg)   BMI 28.67 kg/m²  Estimated body mass index is 28.67 kg/m² as calculated from the following:    Height as of this encounter: 5' 2.01\" (1.575 m).    Weight as of this encounter: 156 lb 12.8 oz (71.1 kg).    Medicare Hearing Assessment:   Hearing Screening    Time taken: 4/11/2024  7:42 AM  Screening Method: Finger Rub  Finger Rub Result: Pass         Visual Acuity:   Right Eye Visual Acuity: Corrected Right Eye Chart Acuity: 20/30   Left Eye Visual Acuity: Corrected Left Eye Chart Acuity: 20/30   Both Eyes Visual Acuity: Corrected Both Eyes Chart Acuity: 20/30   Able To Tolerate Visual Acuity: Yes        Assessment & Plan:   Che Cabrera is a 64 year old female who presents for a Medicare  Assessment.     1. Encounter for annual health examination (Primary)  -     EKG in office, for IPPE (Welcome to Medicare/ Initial Preventative Exam) for new MUSE system  2. Encounter for screening mammogram for malignant neoplasm of breast  -     [facility] GEMINI 2D+3D SCREENING BILAT (CPT=77067/21482); Future; Expected date: 04/11/2024  3. Papanicolaou smear for cervical cancer screening  -     ThinPrep PAP with HPV Reflex Request B; Future; Expected date: 04/11/2024  4. Thyroid nodule: stable. Check labs and ultrasound  -     US THYROID (CPT=76536); Future; Expected date: 04/11/2024  -     TSH and Free T4; Future; Expected date: 04/11/2024  5. History of cervical dysplasia  -     ThinPrep PAP with HPV Reflex Request B; Future; Expected date: 04/11/2024  6. Hyperlipidemia, unspecified hyperlipidemia type: stable. CPM. Check labs  -     Comp Metabolic Panel (14); Future; Expected date: 04/11/2024  -     Lipid Panel; Future; Expected date: 04/11/2024  7. Menopause  -     XR DEXA BONE DENSITOMETRY (CPT=77080); Future; Expected date: 04/11/2024  8. Benign essential hypertension: stable. CPM  -     CBC With Differential With Platelet; Future; Expected date: 04/11/2024  -     Comp Metabolic Panel (14); Future; Expected date: 04/11/2024  -     Urinalysis with Culture Reflex; Future; Expected date: 04/11/2024  9. Weight gain: check thyroid labs. Refer to [facility].  -     Referral to Weight Loss Clinic  10. History of HPV infection  -     ThinPrep PAP with HPV Reflex Request B; Future; Expected date: 04/11/2024  11. Grief reaction: stable. Continue fluoxetine  12. Hypothyroidism, unspecified type: stable Check labs. Continue medication  13. Glucose intolerance: stable. Work on exercise and lower carb diet. Check labs.  -     Hemoglobin A1C; Future; Expected date: 04/11/2024  14. Elevated hemoglobin A1c: stable. Work on exercise and lower carb diet. Check labs.  -     Hemoglobin A1C; Future; Expected date: 04/11/2024    The patient  indicates understanding of these issues and agrees to the plan.  Reinforced healthy diet, lifestyle, and exercise.      No follow-ups on file.     Follow up 6 months and prn.   Stephanie Dressler, PA-C, 4/11/2024     Supplementary Documentation:   General Health:  In the past six months, have you lost more than 10 pounds without trying?: 2 - No  Has your appetite been poor?: No  Type of Diet: Balanced  How does the patient maintain a good energy level?: Appropriate Exercise  How would you describe your daily physical activity?: Light  How would you describe your current health state?: Good  How do you maintain positive mental well-being?: Social Interaction;Visiting Family;Puzzles;Games;Visiting Friends  On a scale of 0 to 10, with 0 being no pain and 10 being severe pain, what is your pain level?: 0 - (None)  In the past six months, have you experienced urine leakage?: 1-Yes  At any time do you feel concerned for the safety/well-being of yourself and/or your children, in your home or elsewhere?: No  Have you had any immunizations at another office such as Influenza, Hepatitis B, Tetanus, or Pneumococcal?: No         Che Cabrera's SCREENING SCHEDULE   Tests on this list are recommended by your physician but may not be covered, or covered at this frequency, by your insurer.   Please check with your insurance carrier before scheduling to verify coverage.   PREVENTATIVE SERVICES FREQUENCY &  COVERAGE DETAILS LAST COMPLETION DATE   Diabetes Screening    Fasting Blood Sugar /  Glucose    One screening every 12 months if never tested or if previously tested but not diagnosed with pre-diabetes   One screening every 6 months if diagnosed with pre-diabetes Lab Results   Component Value Date     (H) 09/25/2023        Cardiovascular Disease Screening    Lipid Panel  Cholesterol  Lipoprotein (HDL)  Triglycerides Covered every 5 years for all Medicare beneficiaries without apparent signs or symptoms of cardiovascular  disease Lab Results   Component Value Date    CHOLEST 159 09/25/2023    HDL 55 09/25/2023    LDL 81 09/25/2023    LDLD 116 (H) 09/15/2015    TRIG 134 09/25/2023         Electrocardiogram (EKG)   Covered if needed at Welcome to Medicare, and non-screening if indicated for medical reasons 11/21/2011      Ultrasound Screening for Abdominal Aortic Aneurysm (AAA) Covered once in a lifetime for one of the following risk factors    Men who are 65-75 years old and have ever smoked    Anyone with a family history -     Colorectal Cancer Screening  Covered for ages 50-85; only need ONE of the following:    Colonoscopy   Covered every 10 years    Covered every 2 years if patient is at high risk or previous colonoscopy was abnormal 02/25/2021    Health Maintenance   Topic Date Due    Colorectal Cancer Screening  02/25/2028       Flexible Sigmoidoscopy   Covered every 4 years -    Fecal Occult Blood Test Covered annually -   Bone Density Screening    Bone density screening    Covered every 2 years after age 65 if diagnosed with risk of osteoporosis or estrogen deficiency.    Covered yearly for long-term glucocorticoid medication use (Steroids) Last Dexa Scan:    XR DEXA BONE DENSITOMETRY (CPT=77080) 02/18/2021      Health Maintenance Due   Topic Date Due    DEXA Scan  03/16/2024      Pap and Pelvic    Pap   Covered every 2 years for women at normal risk; Annually if at high risk 03/23/2023  Health Maintenance   Topic Date Due    Pap Smear  03/23/2024       Chlamydia Annually if high risk -  No recommendations at this time   Screening Mammogram    Mammogram     Recommend annually for all female patients aged 40 and older    One baseline mammogram covered for patients aged 35-39 04/06/2023    Health Maintenance   Topic Date Due    Mammogram  04/06/2024       Immunizations    Influenza Covered once per flu season  Please get every year 09/25/2023  No recommendations at this time    Pneumococcal Each vaccine (Swahkxa02 &  Eknqfqrbv14) covered once after 65 Prevnar 13: -    [id number]: -     Pneumococcal Vaccination(1 of 1 - PCV) Never done    Hepatitis B One screening covered for patients with certain risk factors   04/22/2021  No recommendations at this time    Tetanus Toxoid Not covered by Medicare Part B unless medically necessary (cut with metal); may be covered with your pharmacy prescription benefits -    Tetanus, Diptheria and Pertusis TD and TDaP Not covered by Medicare Part B -  No recommendations at this time    Zoster Not covered by Medicare Part B; may be covered with your pharmacy  prescription benefits 11/19/2013  No recommendations at this time     Annual Monitoring of Persistent Medications (ACE/ARB, digoxin diuretics, anticonvulsants)    Potassium Annually Lab Results   Component Value Date    K 3.8 10/18/2023         Creatinine   Annually Lab Results   Component Value Date    CREATSERUM 1.26 (H) 09/25/2023         BUN Annually Lab Results   Component Value Date    BUN 22 (H) 09/25/2023       Drug Serum Conc Annually No results found for: \"DIGOXIN\", \"DIG\", \"VALP\"             Recommended Prevnar 20.  Advised patient to check with the pharmacy regarding this.

## 2024-04-11 NOTE — PATIENT INSTRUCTIONS
Recommend Prevnar 20 since you turned 64 (this is to help prevent pneumonia). Talk to your pharmacist about this.       Follow up 6 months for medication visit.     Recommended Websites for Advanced Directives     http://www.isms.org/publications/Documents/personal_dec.pdf  An information packet, including necessary form from the [place] [employer] website.      http://www.idph.state.[place]/public/books/advin.htm  A link to the [place] Dept of Public Health. This site has a lot of good information including definitions of the different types of Advance Directives. It also has the State forms available on it's website for anyone to review and print using their home computer and printer. (the forms are also available in Faroese)  www.[employer] Mobileitinwriting.org  This link also has information from the [employer] regarding Advance Directives.        Che Cabrera's SCREENING SCHEDULE   Tests on this list are recommended by your physician but may not be covered, or covered at this frequency, by your insurer.   Please check with your insurance carrier before scheduling to verify coverage.   PREVENTATIVE SERVICES FREQUENCY &  COVERAGE DETAILS LAST COMPLETION DATE   Diabetes Screening    Fasting Blood Sugar /  Glucose    One screening every 12 months if never tested or if previously tested but not diagnosed with pre-diabetes   One screening every 6 months if diagnosed with pre-diabetes Lab Results   Component Value Date     (H) 09/25/2023        Cardiovascular Disease Screening    Lipid Panel  Cholesterol  Lipoprotein (HDL)  Triglycerides Covered every 5 years for all Medicare beneficiaries without apparent signs or symptoms of cardiovascular disease Lab Results   Component Value Date    CHOLEST 159 09/25/2023    HDL 55 09/25/2023    LDL 81 09/25/2023    LDLD 116 (H) 09/15/2015    TRIG 134 09/25/2023         Electrocardiogram (EKG)   Covered if needed at Welcome to Medicare, and  non-screening if indicated for medical reasons 11/21/2011      Ultrasound Screening for Abdominal Aortic Aneurysm (AAA) Covered once in a lifetime for one of the following risk factors    Men who are 65-75 years old and have ever smoked    Anyone with a family history -     Colorectal Cancer Screening  Covered for ages 50-85; only need ONE of the following:    Colonoscopy   Covered every 10 years    Covered every 2 years if patient is at high risk or previous colonoscopy was abnormal 02/25/2021    Health Maintenance   Topic Date Due    Colorectal Cancer Screening  02/25/2028       Flexible Sigmoidoscopy   Covered every 4 years -    Fecal Occult Blood Test Covered annually -   Bone Density Screening    Bone density screening    Covered every 2 years after age 65 if diagnosed with risk of osteoporosis or estrogen deficiency.    Covered yearly for long-term glucocorticoid medication use (Steroids) Last Dexa Scan:    XR DEXA BONE DENSITOMETRY (CPT=77080) 02/18/2021      Health Maintenance Due   Topic Date Due    DEXA Scan  03/16/2024      Pap and Pelvic    Pap   Covered every 2 years for women at normal risk; Annually if at high risk 03/23/2023  Health Maintenance   Topic Date Due    Pap Smear  03/23/2024       Chlamydia Annually if high risk -  No recommendations at this time   Screening Mammogram    Mammogram     Recommend annually for all female patients aged 40 and older    One baseline mammogram covered for patients aged 35-39 04/06/2023    Health Maintenance   Topic Date Due    Mammogram  04/06/2024       Immunizations    Influenza Covered once per flu season  Please get every year 09/25/2023  No recommendations at this time    Pneumococcal Each vaccine (Hkfpeie12 & Ezkmnwnpe98) covered once after 65 Prevnar 13: -    [id number]: -     Pneumococcal Vaccination(1 of 1 - PCV) Never done    Hepatitis B One screening covered for patients with certain risk factors   04/22/2021  No recommendations at this time     Tetanus Toxoid Not covered by Medicare Part B unless medically necessary (cut with metal); may be covered with your pharmacy prescription benefits -    Tetanus, Diptheria and Pertusis TD and TDaP Not covered by Medicare Part B -  No recommendations at this time    Zoster Not covered by Medicare Part B; may be covered with your pharmacy  prescription benefits 11/19/2013  No recommendations at this time     Annual Monitoring of Persistent Medications (ACE/ARB, digoxin diuretics, anticonvulsants)    Potassium Annually Lab Results   Component Value Date    K 3.8 10/18/2023         Creatinine   Annually Lab Results   Component Value Date    CREATSERUM 1.26 (H) 09/25/2023         BUN Annually Lab Results   Component Value Date    BUN 22 (H) 09/25/2023       Drug Serum Conc Annually No results found for: \"DIGOXIN\", \"DIG\", \"VALP\"

## 2024-04-12 ENCOUNTER — LAB ENCOUNTER (OUTPATIENT)
Dept: LAB | Facility: HOSPITAL | Age: 64
End: 2024-04-12
Attending: FAMILY MEDICINE
Payer: MEDICARE

## 2024-04-12 DIAGNOSIS — E04.1 THYROID NODULE: ICD-10-CM

## 2024-04-12 DIAGNOSIS — R73.09 ELEVATED HEMOGLOBIN A1C: ICD-10-CM

## 2024-04-12 DIAGNOSIS — E74.39 GLUCOSE INTOLERANCE: ICD-10-CM

## 2024-04-12 DIAGNOSIS — I10 BENIGN ESSENTIAL HYPERTENSION: ICD-10-CM

## 2024-04-12 DIAGNOSIS — E78.5 HYPERLIPIDEMIA, UNSPECIFIED HYPERLIPIDEMIA TYPE: ICD-10-CM

## 2024-04-12 LAB
ALBUMIN SERPL-MCNC: 3.7 G/DL (ref 3.4–5)
ALBUMIN/GLOB SERPL: 0.8 {RATIO} (ref 1–2)
ALP LIVER SERPL-CCNC: 92 U/L
ALT SERPL-CCNC: 40 U/L
ANION GAP SERPL CALC-SCNC: 6 MMOL/L (ref 0–18)
AST SERPL-CCNC: 20 U/L (ref 15–37)
BASOPHILS # BLD AUTO: 0.06 X10(3) UL (ref 0–0.2)
BASOPHILS NFR BLD AUTO: 0.9 %
BILIRUB SERPL-MCNC: 0.4 MG/DL (ref 0.1–2)
BILIRUB UR QL STRIP.AUTO: NEGATIVE
BUN BLD-MCNC: 24 MG/DL (ref 9–23)
CALCIUM BLD-MCNC: 10.3 MG/DL (ref 8.5–10.1)
CHLORIDE SERPL-SCNC: 106 MMOL/L (ref 98–112)
CHOLEST SERPL-MCNC: 151 MG/DL (ref ?–200)
CLARITY UR REFRACT.AUTO: CLEAR
CO2 SERPL-SCNC: 27 MMOL/L (ref 21–32)
COLOR UR AUTO: YELLOW
CREAT BLD-MCNC: 1.12 MG/DL
EGFRCR SERPLBLD CKD-EPI 2021: 55 ML/MIN/1.73M2 (ref 60–?)
EOSINOPHIL # BLD AUTO: 0.12 X10(3) UL (ref 0–0.7)
EOSINOPHIL NFR BLD AUTO: 1.8 %
ERYTHROCYTE [DISTWIDTH] IN BLOOD BY AUTOMATED COUNT: 12 %
EST. AVERAGE GLUCOSE BLD GHB EST-MCNC: 128 MG/DL (ref 68–126)
FASTING PATIENT LIPID ANSWER: YES
FASTING STATUS PATIENT QL REPORTED: YES
GLOBULIN PLAS-MCNC: 4.8 G/DL (ref 2.8–4.4)
GLUCOSE BLD-MCNC: 130 MG/DL (ref 70–99)
GLUCOSE UR STRIP.AUTO-MCNC: NORMAL MG/DL
HBA1C MFR BLD: 6.1 % (ref ?–5.7)
HCT VFR BLD AUTO: 39.3 %
HDLC SERPL-MCNC: 45 MG/DL (ref 40–59)
HGB BLD-MCNC: 14 G/DL
IMM GRANULOCYTES # BLD AUTO: 0.02 X10(3) UL (ref 0–1)
IMM GRANULOCYTES NFR BLD: 0.3 %
KETONES UR STRIP.AUTO-MCNC: NEGATIVE MG/DL
LDLC SERPL CALC-MCNC: 78 MG/DL (ref ?–100)
LEUKOCYTE ESTERASE UR QL STRIP.AUTO: NEGATIVE
LYMPHOCYTES # BLD AUTO: 2.69 X10(3) UL (ref 1–4)
LYMPHOCYTES NFR BLD AUTO: 40.1 %
MCH RBC QN AUTO: 31.6 PG (ref 26–34)
MCHC RBC AUTO-ENTMCNC: 35.6 G/DL (ref 31–37)
MCV RBC AUTO: 88.7 FL
MONOCYTES # BLD AUTO: 0.4 X10(3) UL (ref 0.1–1)
MONOCYTES NFR BLD AUTO: 6 %
NEUTROPHILS # BLD AUTO: 3.41 X10 (3) UL (ref 1.5–7.7)
NEUTROPHILS # BLD AUTO: 3.41 X10(3) UL (ref 1.5–7.7)
NEUTROPHILS NFR BLD AUTO: 50.9 %
NITRITE UR QL STRIP.AUTO: NEGATIVE
NONHDLC SERPL-MCNC: 106 MG/DL (ref ?–130)
OSMOLALITY SERPL CALC.SUM OF ELEC: 294 MOSM/KG (ref 275–295)
PH UR STRIP.AUTO: 5.5 [PH] (ref 5–8)
PLATELET # BLD AUTO: 266 10(3)UL (ref 150–450)
POTASSIUM SERPL-SCNC: 3.5 MMOL/L (ref 3.5–5.1)
PROT SERPL-MCNC: 8.5 G/DL (ref 6.4–8.2)
PROT UR STRIP.AUTO-MCNC: NEGATIVE MG/DL
RBC # BLD AUTO: 4.43 X10(6)UL
RBC UR QL AUTO: NEGATIVE
SODIUM SERPL-SCNC: 139 MMOL/L (ref 136–145)
SP GR UR STRIP.AUTO: 1.02 (ref 1–1.03)
T4 FREE SERPL-MCNC: 1.1 NG/DL (ref 0.8–1.7)
TRIGL SERPL-MCNC: 161 MG/DL (ref 30–149)
TSI SER-ACNC: 0.11 MIU/ML (ref 0.36–3.74)
UROBILINOGEN UR STRIP.AUTO-MCNC: NORMAL MG/DL
VLDLC SERPL CALC-MCNC: 25 MG/DL (ref 0–30)
WBC # BLD AUTO: 6.7 X10(3) UL (ref 4–11)

## 2024-04-12 PROCEDURE — 81003 URINALYSIS AUTO W/O SCOPE: CPT

## 2024-04-12 PROCEDURE — 84439 ASSAY OF FREE THYROXINE: CPT

## 2024-04-12 PROCEDURE — 83036 HEMOGLOBIN GLYCOSYLATED A1C: CPT

## 2024-04-12 PROCEDURE — 80053 COMPREHEN METABOLIC PANEL: CPT

## 2024-04-12 PROCEDURE — 84443 ASSAY THYROID STIM HORMONE: CPT

## 2024-04-12 PROCEDURE — 85025 COMPLETE CBC W/AUTO DIFF WBC: CPT

## 2024-04-12 PROCEDURE — 80061 LIPID PANEL: CPT

## 2024-04-12 PROCEDURE — 36415 COLL VENOUS BLD VENIPUNCTURE: CPT

## 2024-04-12 RX ORDER — ATORVASTATIN CALCIUM 10 MG/1
10 TABLET, FILM COATED ORAL DAILY
Qty: 90 TABLET | Refills: 1 | Status: SHIPPED | OUTPATIENT
Start: 2024-04-12

## 2024-04-12 NOTE — TELEPHONE ENCOUNTER
LOV: 04/11/2024    Last Refill:   Medication Quantity Refills Start End   atorvastatin 10 MG Oral Tab 90 tablet 1 9/25/2023 --     RTC: 10/11/2024    Protocol: passed

## 2024-04-15 DIAGNOSIS — E83.52 HYPERCALCEMIA: ICD-10-CM

## 2024-04-15 DIAGNOSIS — E03.9 HYPOTHYROIDISM, UNSPECIFIED TYPE: Primary | ICD-10-CM

## 2024-04-15 RX ORDER — LEVOTHYROXINE SODIUM 88 UG/1
88 TABLET ORAL
Qty: 90 TABLET | Refills: 0 | Status: SHIPPED | OUTPATIENT
Start: 2024-04-15

## 2024-04-16 DIAGNOSIS — G43.001 MIGRAINE WITHOUT AURA AND WITH STATUS MIGRAINOSUS, NOT INTRACTABLE: ICD-10-CM

## 2024-04-16 NOTE — TELEPHONE ENCOUNTER
Medication: Amitriptyline 25 mg     Date of last refill: 02/06/2023 with 3 addt refills  [place]      Last office visit: 02/15/2023  Due back to clinic per last office note:    Date next office visit scheduled:  05/8/2024       Last OV note recommendation:     Discussion plus Diagnostics & Treatment Orders:     Migraines controlled. Continue the Amitriptyline 25mg nightly for headache prevention. Can continue the fioricet for abortive tx        (X) Discussed potential side effects of any treatment relevant to above.  Includes explanation of tests as necessary.     Return in about 1 year (around 2/15/2024).

## 2024-04-17 RX ORDER — AMITRIPTYLINE HYDROCHLORIDE 25 MG/1
25 TABLET, FILM COATED ORAL NIGHTLY
Qty: 30 TABLET | Refills: 0 | Status: SHIPPED | OUTPATIENT
Start: 2024-04-17

## 2024-04-18 LAB
.: NORMAL
.: NORMAL
HPV I/H RISK 1 DNA SPEC QL NAA+PROBE: NEGATIVE

## 2024-04-19 RX ORDER — VALSARTAN AND HYDROCHLOROTHIAZIDE 160; 25 MG/1; MG/1
1 TABLET ORAL DAILY
Qty: 90 TABLET | Refills: 1 | Status: SHIPPED | OUTPATIENT
Start: 2024-04-19

## 2024-04-19 RX ORDER — AMLODIPINE BESYLATE 10 MG/1
10 TABLET ORAL DAILY
Qty: 90 TABLET | Refills: 1 | Status: SHIPPED | OUTPATIENT
Start: 2024-04-19

## 2024-04-19 RX ORDER — LEVOTHYROXINE SODIUM 88 UG/1
88 TABLET ORAL
Qty: 90 TABLET | Refills: 0 | OUTPATIENT
Start: 2024-04-19

## 2024-04-19 RX ORDER — FAMOTIDINE 20 MG/1
20 TABLET, FILM COATED ORAL 2 TIMES DAILY
Qty: 180 TABLET | Refills: 1 | Status: SHIPPED | OUTPATIENT
Start: 2024-04-19

## 2024-04-19 RX ORDER — OMEGA-3-ACID ETHYL ESTERS 1 G/1
4 CAPSULE, LIQUID FILLED ORAL DAILY
Qty: 360 CAPSULE | Refills: 0 | Status: SHIPPED | OUTPATIENT
Start: 2024-04-19

## 2024-04-19 NOTE — TELEPHONE ENCOUNTER
LOV: 04/11/2024    Last Refill:   Requested Prescriptions     Pending Prescriptions Disp Refills    VALSARTAN-HYDROCHLOROTHIAZIDE 160-25 MG Oral Tab [Pharmacy Med Name: VALSARTAN-HCTZ 160-25 MG TAB] 90 tablet 1     Sig: TAKE 1 TABLET BY MOUTH EVERY DAY    LEVOTHYROXINE 88 MCG Oral Tab [Pharmacy Med Name: LEVOTHYROXINE 88 MCG TABLET] 90 tablet 0     Sig: TAKE 1 TABLET BY MOUTH BEFORE BREAKFAST.    AMLODIPINE 10 MG Oral Tab [Pharmacy Med Name: AMLODIPINE BESYLATE 10 MG TAB] 90 tablet 1     Sig: TAKE 1 TABLET BY MOUTH EVERY DAY    OMEGA-3-ACID ETHYL ESTERS 1 g Oral Cap [Pharmacy Med Name: OMEGA-3 ETHYL ESTERS 1 GM CAP] 360 capsule 0     Sig: TAKE 4 CAPSULES (4 G TOTAL) BY MOUTH DAILY.    FAMOTIDINE 20 MG Oral Tab [Pharmacy Med Name: FAMOTIDINE 20 MG TABLET] 180 tablet 0     Sig: TAKE 1 TABLET BY MOUTH TWICE A DAY       RTC: 10/11/2024    Protocol: Levothyroxine already sent. Passed for all else.

## 2024-04-25 ENCOUNTER — HOSPITAL ENCOUNTER (OUTPATIENT)
Dept: ULTRASOUND IMAGING | Age: 64
Discharge: HOME | End: 2024-04-25
Attending: FAMILY MEDICINE
Payer: MEDICARE

## 2024-04-25 ENCOUNTER — HOSPITAL ENCOUNTER (OUTPATIENT)
Dept: MAMMOGRAPHY | Age: 64
Discharge: HOME | End: 2024-04-25
Attending: FAMILY MEDICINE
Payer: MEDICARE

## 2024-04-25 ENCOUNTER — HOSPITAL ENCOUNTER (OUTPATIENT)
Dept: BONE DENSITY | Age: 64
Discharge: HOME | End: 2024-04-25
Attending: FAMILY MEDICINE
Payer: MEDICARE

## 2024-04-25 DIAGNOSIS — Z12.31 ENCOUNTER FOR SCREENING MAMMOGRAM FOR MALIGNANT NEOPLASM OF BREAST: ICD-10-CM

## 2024-04-25 DIAGNOSIS — Z78.0 MENOPAUSE: ICD-10-CM

## 2024-04-25 DIAGNOSIS — E04.1 THYROID NODULE: ICD-10-CM

## 2024-04-25 PROCEDURE — 77063 BREAST TOMOSYNTHESIS BI: CPT | Performed by: FAMILY MEDICINE

## 2024-04-25 PROCEDURE — 77080 DXA BONE DENSITY AXIAL: CPT | Performed by: FAMILY MEDICINE

## 2024-04-25 PROCEDURE — 76536 US EXAM OF HEAD AND NECK: CPT | Performed by: FAMILY MEDICINE

## 2024-04-25 PROCEDURE — 77067 SCR MAMMO BI INCL CAD: CPT | Performed by: FAMILY MEDICINE

## 2024-04-25 RX ORDER — FLUOXETINE HYDROCHLORIDE 20 MG/1
20 CAPSULE ORAL DAILY
Qty: 90 CAPSULE | Refills: 1 | Status: SHIPPED | OUTPATIENT
Start: 2024-04-25

## 2024-04-25 RX ORDER — LEVOTHYROXINE SODIUM 0.1 MG/1
100 TABLET ORAL DAILY
Qty: 90 TABLET | Refills: 1 | OUTPATIENT
Start: 2024-04-25

## 2024-04-25 NOTE — TELEPHONE ENCOUNTER
Last Office Visit: 04/11/2024  Last Refill:   Medication Quantity Refills Start End   levothyroxine 88 MCG Oral Tab 90 tablet 0 4/15/2024 --     Medication Quantity Refills Start End   FLUoxetine 20 MG Oral Cap 90 capsule 1 9/25/2023 --     Return to Clinic: 10/11/2024    Protocol: n/a    Refill pended. Please approve if okay. Thank you.  Patient no longer taking 100mcg of levothyroxine. Currently taking 88mcg. Levothyroxine denied.

## 2024-05-06 ENCOUNTER — NURSE ONLY (OUTPATIENT)
Dept: HEMATOLOGY/ONCOLOGY | Facility: HOSPITAL | Age: 64
End: 2024-05-06
Attending: INTERNAL MEDICINE
Payer: MEDICARE

## 2024-05-06 DIAGNOSIS — D47.2 MONOCLONAL GAMMOPATHY: ICD-10-CM

## 2024-05-06 LAB
ALBUMIN SERPL-MCNC: 3.6 G/DL (ref 3.4–5)
ALBUMIN/GLOB SERPL: 0.8 {RATIO} (ref 1–2)
ALP LIVER SERPL-CCNC: 103 U/L
ALT SERPL-CCNC: 50 U/L
ANION GAP SERPL CALC-SCNC: 6 MMOL/L (ref 0–18)
AST SERPL-CCNC: 35 U/L (ref 15–37)
BASOPHILS # BLD AUTO: 0.03 X10(3) UL (ref 0–0.2)
BASOPHILS NFR BLD AUTO: 0.5 %
BILIRUB SERPL-MCNC: 0.4 MG/DL (ref 0.1–2)
BUN BLD-MCNC: 21 MG/DL (ref 9–23)
CALCIUM BLD-MCNC: 10.2 MG/DL (ref 8.5–10.1)
CHLORIDE SERPL-SCNC: 105 MMOL/L (ref 98–112)
CO2 SERPL-SCNC: 27 MMOL/L (ref 21–32)
CREAT BLD-MCNC: 1.37 MG/DL
EGFRCR SERPLBLD CKD-EPI 2021: 43 ML/MIN/1.73M2 (ref 60–?)
EOSINOPHIL # BLD AUTO: 0.12 X10(3) UL (ref 0–0.7)
EOSINOPHIL NFR BLD AUTO: 1.8 %
ERYTHROCYTE [DISTWIDTH] IN BLOOD BY AUTOMATED COUNT: 12.2 %
GLOBULIN PLAS-MCNC: 4.5 G/DL (ref 2.8–4.4)
GLUCOSE BLD-MCNC: 125 MG/DL (ref 70–99)
HCT VFR BLD AUTO: 37.9 %
HGB BLD-MCNC: 13.1 G/DL
IGA SERPL-MCNC: 75 MG/DL (ref 70–312)
IGM SERPL-MCNC: 98.4 MG/DL (ref 43–279)
IMM GRANULOCYTES # BLD AUTO: 0.01 X10(3) UL (ref 0–1)
IMM GRANULOCYTES NFR BLD: 0.2 %
IMMUNOGLOBULIN PNL SER-MCNC: 1660 MG/DL (ref 791–1643)
LDH SERPL L TO P-CCNC: 201 U/L
LYMPHOCYTES # BLD AUTO: 2.46 X10(3) UL (ref 1–4)
LYMPHOCYTES NFR BLD AUTO: 37.5 %
MCH RBC QN AUTO: 30.8 PG (ref 26–34)
MCHC RBC AUTO-ENTMCNC: 34.6 G/DL (ref 31–37)
MCV RBC AUTO: 89.2 FL
MONOCYTES # BLD AUTO: 0.36 X10(3) UL (ref 0.1–1)
MONOCYTES NFR BLD AUTO: 5.5 %
NEUTROPHILS # BLD AUTO: 3.58 X10 (3) UL (ref 1.5–7.7)
NEUTROPHILS # BLD AUTO: 3.58 X10(3) UL (ref 1.5–7.7)
NEUTROPHILS NFR BLD AUTO: 54.5 %
OSMOLALITY SERPL CALC.SUM OF ELEC: 290 MOSM/KG (ref 275–295)
PLATELET # BLD AUTO: 229 10(3)UL (ref 150–450)
POTASSIUM SERPL-SCNC: 3.1 MMOL/L (ref 3.5–5.1)
PROT SERPL-MCNC: 8.1 G/DL (ref 6.4–8.2)
RBC # BLD AUTO: 4.25 X10(6)UL
SODIUM SERPL-SCNC: 138 MMOL/L (ref 136–145)
WBC # BLD AUTO: 6.6 X10(3) UL (ref 4–11)

## 2024-05-06 PROCEDURE — 82784 ASSAY IGA/IGD/IGG/IGM EACH: CPT

## 2024-05-06 PROCEDURE — 83615 LACTATE (LD) (LDH) ENZYME: CPT

## 2024-05-06 PROCEDURE — 36415 COLL VENOUS BLD VENIPUNCTURE: CPT

## 2024-05-06 PROCEDURE — 83521 IG LIGHT CHAINS FREE EACH: CPT

## 2024-05-06 PROCEDURE — 85025 COMPLETE CBC W/AUTO DIFF WBC: CPT

## 2024-05-06 PROCEDURE — 86334 IMMUNOFIX E-PHORESIS SERUM: CPT

## 2024-05-06 PROCEDURE — 84165 PROTEIN E-PHORESIS SERUM: CPT

## 2024-05-06 PROCEDURE — 80053 COMPREHEN METABOLIC PANEL: CPT

## 2024-05-07 LAB
ALBUMIN SERPL ELPH-MCNC: 3.97 G/DL (ref 3.75–5.21)
ALBUMIN/GLOB SERPL: 1.12 {RATIO} (ref 1–2)
ALPHA1 GLOB SERPL ELPH-MCNC: 0.3 G/DL (ref 0.19–0.46)
ALPHA2 GLOB SERPL ELPH-MCNC: 0.85 G/DL (ref 0.48–1.05)
B-GLOBULIN SERPL ELPH-MCNC: 0.8 G/DL (ref 0.68–1.23)
GAMMA GLOB SERPL ELPH-MCNC: 1.58 G/DL (ref 0.62–1.7)
KAPPA LC FREE SER-MCNC: 2.23 MG/DL (ref 0.33–1.94)
KAPPA LC FREE/LAMBDA FREE SER NEPH: 1.27 {RATIO} (ref 0.26–1.65)
LAMBDA LC FREE SERPL-MCNC: 1.76 MG/DL (ref 0.57–2.63)
M PROTEIN 1 SERPL ELPH-MCNC: 1.16 G/DL (ref ?–0)
PROT SERPL-MCNC: 7.5 G/DL (ref 5.7–8.2)

## 2024-05-08 ENCOUNTER — OFFICE VISIT (OUTPATIENT)
Dept: NEUROLOGY | Facility: CLINIC | Age: 64
End: 2024-05-08
Payer: MEDICARE

## 2024-05-08 ENCOUNTER — OFFICE VISIT (OUTPATIENT)
Dept: HEMATOLOGY/ONCOLOGY | Facility: HOSPITAL | Age: 64
End: 2024-05-08
Attending: INTERNAL MEDICINE
Payer: MEDICARE

## 2024-05-08 VITALS
HEIGHT: 62.01 IN | WEIGHT: 157 LBS | BODY MASS INDEX: 28.53 KG/M2 | SYSTOLIC BLOOD PRESSURE: 100 MMHG | DIASTOLIC BLOOD PRESSURE: 78 MMHG | HEART RATE: 82 BPM | RESPIRATION RATE: 16 BRPM

## 2024-05-08 VITALS
OXYGEN SATURATION: 97 % | TEMPERATURE: 98 F | SYSTOLIC BLOOD PRESSURE: 145 MMHG | RESPIRATION RATE: 16 BRPM | BODY MASS INDEX: 28.41 KG/M2 | HEIGHT: 62.01 IN | WEIGHT: 156.38 LBS | HEART RATE: 82 BPM | DIASTOLIC BLOOD PRESSURE: 81 MMHG

## 2024-05-08 DIAGNOSIS — G43.001 MIGRAINE WITHOUT AURA AND WITH STATUS MIGRAINOSUS, NOT INTRACTABLE: ICD-10-CM

## 2024-05-08 DIAGNOSIS — N18.2 STAGE 2 CHRONIC KIDNEY DISEASE: ICD-10-CM

## 2024-05-08 DIAGNOSIS — D47.2 MONOCLONAL GAMMOPATHY: Primary | ICD-10-CM

## 2024-05-08 DIAGNOSIS — E83.52 HYPERCALCEMIA: ICD-10-CM

## 2024-05-08 PROCEDURE — 99213 OFFICE O/P EST LOW 20 MIN: CPT | Performed by: PHYSICIAN ASSISTANT

## 2024-05-08 PROCEDURE — 99214 OFFICE O/P EST MOD 30 MIN: CPT | Performed by: INTERNAL MEDICINE

## 2024-05-08 RX ORDER — AMITRIPTYLINE HYDROCHLORIDE 25 MG/1
25 TABLET, FILM COATED ORAL NIGHTLY
Qty: 90 TABLET | Refills: 3 | Status: SHIPPED | OUTPATIENT
Start: 2024-05-08

## 2024-05-08 NOTE — PROGRESS NOTES
Cancer Center Progress Note    Problem List:      Patient Active Problem List   Diagnosis    Symptomatic menopausal or female climacteric states    Allergy, unspecified not elsewhere classified    HPV (human papilloma virus) infection    Grief reaction    Benign essential hypertension    Hypothyroidism    Migraine    Multinodular goiter    Monoclonal gammopathy    Osteopenia    Hyperlipidemia    Glucose intolerance    History of cervical dysplasia    Family history of colon cancer    Special screening for malignant neoplasm of colon    Dysphagia, unspecified       Interim History:    Che Cabrera presents today for evaluation and management of a diagnosis of monoclonal gammopathy (IgG lambda).    She is doing well. She has no pain. She has no dyspnea or cough. She has no fever or sweats. She has no other complaints.    She is being followed for monoclonal gammopathy of undetermined significance. She has an IgG lambda protein. The quantitative Ig's were low. The M-spike is stable. The kappa/lambda ratio is stable.      Bone marrow biopsy from 2/9/2015:  Final Diagnosis:  Peripheral blood smear, bone marrow aspiration, touch preparation, clot  and biopsy:  -Overall normocellular bone marrow with active trilineage hematopoiesis.  -Plasma cells comprise 2% of all nucleated cells.    Comment:  The bone marrow cellularity is overall normal for the patient's stated  age.  There is active trilineage hematopoiesis.  There are no  significant dysplastic changes or increase in blasts.  Plasma cells  comprise approximately 2% of all nucleated cells.  Morphologically, they  appear small and mature.  No aggregates or sheets of plasma cells are  identified.  There are no abnormal lymphoid aggregates.    Flow cytometry shows no monotypic B-cell population or aberrant T-cell  phenotype. Plasma cells appear polytypic.    Immunohistochemistry highlights scattered plasma cells comprising less  than 5% with .  CD3 highlights  the T-lymphocytes which predominate,  while CD20 highlights a few, scattered small B-lymphocytes.    Review of Systems:   Constitutional: Negative for anorexia, fatigue, fevers, chills, night sweats and weight loss.  Eyes: Negative for visual disturbance, irritation and redness.  Respiratory: Negative for cough, hemoptysis, chest pain, or dyspnea.  Cardiovascular: Negative for angina, orthopnea or palpitations.  Gastrointestinal: Negative for nausea, vomiting, change in bowel habits, diarrhea, constipation and abdominal pain.  Integument/breast: Negative for rash, skin lesions, and pruritus.  Hematologic/lymphatic: Negative for easy bruising, bleeding, and lymphadenopathy.  Musculoskeletal: Negative for myalgias, arthralgias, muscle weakness.  Genitourinary: Negative for dysuria or hematuria  Neurological: Negative for headaches, dizziness, speech problems, gait problems and focal weakness.  Psychiatric: The patient's mood was calm and appropriate for this visit.  The pertinent positives and negatives were described. All other systems were negative.    PMH/PSH:  Past Medical History:    Arthritis    Decorative tattoo    Hx estrogen therapy    HRT    Hx of colposcopy with cervical biopsy    HYPERTHYROIDISM    Kidney stone    Thyroid disease    Unspecified essential hypertension       Past Surgical History:   Procedure Laterality Date    Appendectomy  11/20/2011    Laproscopic    Colonoscopy  10/21/2010    Repeat 5 years, No polyps     Dexa, axial site (spine, hips, pelvis)  07/26/2010    [id number], screening  10/2010    Pap smear  09/30/2010    Upper gi endoscopy performed         Family History Reviewed:  Family History   Problem Relation Age of Onset    Hypertension Mother     Heart Attack Mother         AMI    Other (COPD) Mother     Other (MS) Mother     Cancer Maternal Grandmother         Colon    Stroke Sister        Allergies:     Allergies   Allergen Reactions    Milk Thistle      Powd     Peanuts       Derived Products       Medications:   amitriptyline 25 MG Oral Tab Take 1 tablet (25 mg total) by mouth nightly. 90 tablet 3    FLUoxetine 20 MG Oral Cap Take 1 capsule (20 mg total) by mouth daily. 90 capsule 1    VALSARTAN-HYDROCHLOROTHIAZIDE 160-25 MG Oral Tab TAKE 1 TABLET BY MOUTH EVERY DAY 90 tablet 1    AMLODIPINE 10 MG Oral Tab TAKE 1 TABLET BY MOUTH EVERY DAY 90 tablet 1    omega-3-acid ethyl esters 1 g Oral Cap TAKE 4 CAPSULES (4 G TOTAL) BY MOUTH DAILY. 360 capsule 0    famotidine 20 MG Oral Tab TAKE 1 TABLET BY MOUTH TWICE A  tablet 1    levothyroxine 88 MCG Oral Tab Take 1 tablet (88 mcg total) by mouth before breakfast. 90 tablet 0    ATORVASTATIN 10 MG Oral Tab TAKE 1 TABLET BY MOUTH EVERY DAY 90 tablet 1    omeprazole 20 MG Oral Capsule Delayed Release Take 1 capsule (20 mg total) by mouth Before Dinner. 90 capsule 3    butalbital-acetaminophen-caffeine -40 MG Oral Tab TAKE 1- 2 TABLETS BY MOUTH EVERY 6 HOURS AS NEEDED FOR HEADACHE, LIMIT USE TO 3 TIMES A WEEK 30 tablet 5    Multiple Vitamin (MULTIVITAMIN OR) Take  by mouth.      Cholecalciferol (VITAMIN D) 2000 UNIT Oral Tab Take by mouth. One 5,000 unit, one 2,000 unit daily       Calcium Carbonate-Vitamin D (CALCIUM + D OR) None Entered           Vital Signs:      Height: 157.5 cm (5' 2.01\") (05/08 1154)  Weight: 70.9 kg (156 lb 6.4 oz) (05/08 1154)  BSA (Calculated - sq m): 1.72 sq meters (05/08 1154)  Pulse: 82 (05/08 1154)  BP: 145/81 (05/08 1154)  Temp: 97.9 °F (36.6 °C) (05/08 1154)  Do Not Use - Resp Rate: --  SpO2: 97 % (05/08 1154)      Performance Status:  ECOG 0: Fully active, able to carry on all pre-disease performance without restriction     Physical Examination:    Constitutional: Patient is alert and oriented x 3, not in acute distress.  Eyes: Anicteric sclera, pink conjunctiva.  HEENT:  Oropharynx is clear. Neck is supple.  Respiratory: Clear to auscultation and percussion. No rales.  No wheezes.  Cardiovascular:  Regular rate and rhythm. No murmurs.  Gastrointestinal: Soft, non tender with good bowel sounds.  Musculoskeletal: No edema. No calf tenderness.  Neurological: Grossly intact without focal motor or sensory deficit.  Skin: No suspicious skin lesion, no rash, no ulceration.  Lymphatics: There is no palpable lymphadenopathy throughout in the cervical, supraclavicular, or axillary regions.  Psychiatric: The patient's mood is calm and appropriate for this visit.      Labs reviewed at this visit:     Lab Results   Component Value Date    WBC 6.6 05/06/2024    RBC 4.25 05/06/2024    HGB 13.1 05/06/2024    HCT 37.9 05/06/2024    MCV 89.2 05/06/2024    MCH 30.8 05/06/2024    MCHC 34.6 05/06/2024    RDW 12.2 05/06/2024    .0 05/06/2024    MPV 11.7 (H) 02/29/2012     Lab Results   Component Value Date     05/06/2024    K 3.1 (L) 05/06/2024     05/06/2024    CO2 27.0 05/06/2024    BUN 21 05/06/2024    CREATSERUM 1.37 (H) 05/06/2024     (H) 05/06/2024    CA 10.2 (H) 05/06/2024    ALKPHO 103 05/06/2024    ALT 50 05/06/2024    AST 35 05/06/2024    BILT 0.4 05/06/2024    ALB 3.6 05/06/2024    ALB 3.97 05/06/2024    TP 8.1 05/06/2024    TP 7.5 05/06/2024     Lab Component   Component Value Date/Time     05/06/2024 0804         Component  Ref Range & Units 5/6/24 0804   Immunoglobulin A  70.00 - 312.00 mg/dL 75.00   Immunoglobulin G  791 - 1,643 mg/dL 1,660 High    Immunoglobulin M  43.0 - 279.0 mg/dL 98.4            Component  Ref Range & Units 5/6/24 0804   Total Protein  5.7 - 8.2 g/dL 7.5   Albumin  3.75 - 5.21 g/dL 3.97   Alpha-1-Globulins  0.19 - 0.46 g/dL 0.30   Alpha-2-Globulins  0.48 - 1.05 g/dL 0.85   Beta Globulins  0.68 - 1.23 g/dL 0.80   Gamma Globulins  0.62 - 1.70 g/dL 1.58   Albumin/Globulin Ratio  1.00 - 2.00 1.12   SPE Interpretation Monoclonal spike in the gamma region.   Immunofixation Monoclonal IgG lambda. If clinically indicated, suggest 24 hour urine monoclonal  protein  studies.     [place] Free Light Chain  0.330 - 1.940 mg/dL 2.235 High    Lambda Free Light Chain  0.571 - 2.630 mg/dL 1.763   Kappa/Lambda Flc Ratio  0.26 - 1.65 1.27   Comment: Note: If renal impairment is present, use a reference range of 0.37 - 3.10 for Kappa/Lambda FLC ratio.       M-Shay  <=0.00 g/dL 1.16 High             Radiologic imaging reviewed at this visit:    CT of the abd/pelvis on 11/24/2022:  FINDINGS:     KIDNEYS:  5.6 mm calculus in the left side of the bladder adjacent to the left ureterovesicular junction.  2.8 mm calculus pole the right kidney.   ADRENALS:  No mass or enlargement.     LIVER:  No enlargement, atrophy, abnormal density, or significant focal lesion.     BILIARY:  No visible dilatation or calcification.     PANCREAS:  No lesion, fluid collection, ductal dilatation, or atrophy.     SPLEEN:  No enlargement or focal lesion.     AORTA/VASCULAR:  No aneurysm.     RETROPERITONEUM:  No mass or adenopathy.     BOWEL/MESENTERY:  No visible mass, obstruction, or bowel wall thickening.  Sequelae of appendectomy noted.   ABDOMINAL WALL:  No mass or hernia.     BONES:  No bony lesion or fracture.   PELVIC ORGANS:  Normal for age.     LUNG BASES:  No visible pulmonary or pleural disease.     OTHER:  Negative.        Impression   CONCLUSION:         A 5.6 mm calculus in the bladder adjacent left radicular junction is unchanged.  No hydronephrosis.       Agree with preliminary radiology report from [facility].          Mammogram on 3/17/2022:  BREAST COMPOSITION:   Scattered areas fibroglandular density.       FINDINGS:     RIGHT BREAST:  No significant or suspicious finding.         LEFT BREAST:  No significant or suspicious finding.        Impression   CONCLUSION:     No suspicious change from prior mammography.  No mammographic evidence of malignancy.       BI-RADS CATEGORY:     DIAGNOSTIC CATEGORY 1--NEGATIVE ASSESSMENT.         RECOMMENDATIONS:     ROUTINE MAMMOGRAM AND CLINICAL  EVALUATION IN 12 MONTHS.           Assessment/Plan:     IgG lambda monoclonal gammopathy of undetermined significance:     She had a bone marrow biopsy in 2015 with 2% plasma cells. Repeat M protein continues to be stable. The IgG level is normal. She has had fluctuation renal function and borderline hypercalcemia for years. I recommended that we get a low dose CT scan of the bones. If this is negative then I recommended a six month follow up with repeat labs.      Chevy Urias MD

## 2024-05-08 NOTE — PROGRESS NOTES
Patient here for 1 year f/u for monoclonal gammopathy. Patient states she feels good with no complaints. Patient is accompanied by her      Education Record    Learner:  Patient and Spouse    Disease / Diagnosis: monoclonal gammopathy     Barriers / Limitations:  None   Comments:    Method:  Discussion   Comments:    General Topics:  Medication, Side effects and symptom management, and Plan of care reviewed   Comments:    Outcome:  Shows understanding   Comments:

## 2024-05-08 NOTE — PROGRESS NOTES
NEUROLOGY  [facility]       Previous visit and existing record notes reviewed in preparation for the face to face visit.  Relevant labs and studies reviewed and will be noted in relevant areas of this record.    Che OROSCO Deborah  3/16/1960  Primary Care Provider:  Nahun Mcdonald MD    5/8/2024  64 year old female,      was last seen on: Patient was last seen 2/15/23 and at that time her migraines were well controlled    Seen for/plans last visit:  Migraines    Accompanied visit: No     Present condition:    Migraines are well controlled. Has not had any migraines since her last visit. She is currently on the Amitriptyline for headache prevention and tolerating it without side effects. When he does get a migraine the fioricet is effective for abortive tx. She is here for annual visit and refills. She has no complaints today      Past History update/new problem(s): MGUS following with Dr. Urias    Review of Systems:  Review of Systems:  Denies systemic symptoms     No CP or SOB.  No GI or  symptoms. Relevant Neuro as noted above.    Past Medical History:    Arthritis    Decorative tattoo    Hx estrogen therapy    HRT    Hx of colposcopy with cervical biopsy    HYPERTHYROIDISM    Kidney stone    Thyroid disease    Unspecified essential hypertension       Medications:      Current Outpatient Medications:     amitriptyline 25 MG Oral Tab, Take 1 tablet (25 mg total) by mouth nightly., Disp: 90 tablet, Rfl: 3    FLUoxetine 20 MG Oral Cap, Take 1 capsule (20 mg total) by mouth daily., Disp: 90 capsule, Rfl: 1    VALSARTAN-HYDROCHLOROTHIAZIDE 160-25 MG Oral Tab, TAKE 1 TABLET BY MOUTH EVERY DAY, Disp: 90 tablet, Rfl: 1    AMLODIPINE 10 MG Oral Tab, TAKE 1 TABLET BY MOUTH EVERY DAY, Disp: 90 tablet, Rfl: 1    omega-3-acid ethyl esters 1 g Oral Cap, TAKE 4 CAPSULES (4 G TOTAL) BY MOUTH DAILY., Disp: 360 capsule, Rfl: 0    famotidine 20 MG Oral Tab, TAKE 1 TABLET BY MOUTH TWICE A DAY, Disp: 180 tablet,  Rfl: 1    levothyroxine 88 MCG Oral Tab, Take 1 tablet (88 mcg total) by mouth before breakfast., Disp: 90 tablet, Rfl: 0    ATORVASTATIN 10 MG Oral Tab, TAKE 1 TABLET BY MOUTH EVERY DAY, Disp: 90 tablet, Rfl: 1    omeprazole 20 MG Oral Capsule Delayed Release, Take 1 capsule (20 mg total) by mouth Before Dinner., Disp: 90 capsule, Rfl: 3    butalbital-acetaminophen-caffeine -40 MG Oral Tab, TAKE 1- 2 TABLETS BY MOUTH EVERY 6 HOURS AS NEEDED FOR HEADACHE, LIMIT USE TO 3 TIMES A WEEK, Disp: 30 tablet, Rfl: 5    Multiple Vitamin (MULTIVITAMIN OR), Take  by mouth., Disp: , Rfl:     Cholecalciferol (VITAMIN D) 2000 UNIT Oral Tab, Take by mouth. One 5,000 unit, one 2,000 unit daily , Disp: , Rfl:     Calcium Carbonate-Vitamin D (CALCIUM + D OR), None Entered, Disp: , Rfl:   PRN:     Allergies:  Allergies   Allergen Reactions    Milk Thistle      Powd     Peanuts      Derived Products          EXAM:  /78 (BP Location: Left arm, Patient Position: Sitting, Cuff Size: adult)   Pulse 82   Resp 16   Ht 62.01\"   Wt 157 lb (71.2 kg)   BMI 28.71 kg/m²   Looks stated age  General Exam:  HENT:  pink conjunctiva anicteric sclerae  Neck no adenopathy, thyroid normal  Heart and Lungs:  normal  Extremities: no cyanosis, skin changes    NEURO  NAD, A&Ox3  EOMI  CN II-XII intact  Strength 5/5 all extremities  DTRs 2+ and symmetric  FTN intact bilaterally  No drift on exam  Normal gait  Tandem gait intact  Romberg negative    Problem/s Identified this visit:   1. Migraine without aura and with status migrainosus, not intractable          Discussion plus Diagnostics & Treatment Orders:    Migraines- controlled. Continue the Amitriptyline 25mg nightly. Can use the Fioricet for abortive tx prn. If headaches continue to be well controlled at next visit then acacia consider tapering down the Amitriptyline to 10mg nightly. She expressed full understanding.      (X) Discussed potential side effects of any treatment relevant to  above.  Includes explanation of tests as necessary.    Return in about 1 year (around 5/8/2025).      Patient understands that if needed, based on condition and or test results, follow up will be readjusted    Sandra Harman PA-C  [facility]  5/8/2024, Time completed 8:47 AM

## 2024-05-08 NOTE — PATIENT INSTRUCTIONS
Refill policies:    Allow 2-3 business days for refills; controlled substances may take longer.  Contact your pharmacy at least 5 days prior to running out of medication and have them send an electronic request or submit request through the “request refill” option in your [employer] account.  Refills are not addressed on weekends; covering physicians do not authorize routine medications on weekends.  No narcotics or controlled substances are refilled after noon on Fridays or by on call physicians.  By law, narcotics must be electronically prescribed.  A 30 day supply with no refills is the maximum allowed.  If your prescription is due for a refill, you may be due for a follow up appointment.  To best provide you care, patients receiving routine medications need to be seen at least once a year.  Patients receiving narcotic/controlled substance medications need to be seen at least once every 3 months.  In the event that your preferred pharmacy does not have the requested medication in stock (e.g. Backordered), it is your responsibility to find another pharmacy that has the requested medication available.  We will gladly send a new prescription to that pharmacy at your request.    Scheduling Tests:    If your physician has ordered radiology tests such as MRI or CT scans, please contact Central Scheduling at [phone] right away to schedule the test.  Once scheduled, the [facility] Centralized Referral Team will work with your insurance carrier to obtain pre-certification or prior authorization.  Depending on your insurance carrier, approval may take 3-10 days.  It is highly recommended patients assure they have received an authorization before having a test performed.  If test is done without insurance authorization, patient may be responsible for the entire amount billed.      Precertification and Prior Authorizations:  If your physician has recommended that you have a procedure or additional testing performed the [facility]  Centralized Referral Team will contact your insurance carrier to obtain pre-certification or prior authorization.    You are strongly encouraged to contact your insurance carrier to verify that your procedure/test has been approved and is a COVERED benefit.  Although the [facility] Centralized Referral Team does its due diligence, the insurance carrier gives the disclaimer that \"Although the procedure is authorized, this does not guarantee payment.\"    Ultimately the patient is responsible for payment.   Thank you for your understanding in this matter.  Paperwork Completion:  If you require FMLA or disability paperwork for your recovery, please make sure to either drop it off or have it faxed to our office at [phone]. Be sure the form has your name and date of birth on it.  The form will be faxed to our Forms Department and they will complete it for you.  There is a 25$ fee for all forms that need to be filled out.  Please be aware there is a 10-14 day turnaround time.  You will need to sign a release of information (PIERO) form if your paperwork does not come with one.  You may call the Forms Department with any questions at [phone].  Their fax number is [phone].

## 2024-06-04 ENCOUNTER — HOSPITAL ENCOUNTER (OUTPATIENT)
Dept: CT IMAGING | Facility: HOSPITAL | Age: 64
Discharge: HOME | End: 2024-06-04
Attending: INTERNAL MEDICINE
Payer: MEDICARE

## 2024-06-04 DIAGNOSIS — D47.2 MONOCLONAL GAMMOPATHY: ICD-10-CM

## 2024-06-04 PROCEDURE — 76497 UNLISTED CT PROCEDURE: CPT | Performed by: INTERNAL MEDICINE

## 2024-06-19 ENCOUNTER — TELEPHONE (OUTPATIENT)
Dept: FAMILY MEDICINE CLINIC | Facility: CLINIC | Age: 64
End: 2024-06-19

## 2024-06-19 NOTE — TELEPHONE ENCOUNTER
Patient calling- would like a call back to discuss Stephanie Dressler's departure from the office..

## 2024-06-20 NOTE — TELEPHONE ENCOUNTER
Discussed with patient.  Sh prefers female provider.  Will plan to see Sharon Rodriguez for med check in October.

## 2024-07-05 RX ORDER — LEVOTHYROXINE SODIUM 88 UG/1
88 TABLET ORAL
Qty: 90 TABLET | Refills: 0 | Status: SHIPPED | OUTPATIENT
Start: 2024-07-05

## 2024-07-05 NOTE — TELEPHONE ENCOUNTER
Last Office Visit: 4/11/2024  Last Refill: 4/15/2024  Return to Clinic: 6 months   Protocol: failed  NOV: n/a    Requested Prescriptions     Pending Prescriptions Disp Refills    LEVOTHYROXINE 88 MCG Oral Tab [Pharmacy Med Name: LEVOTHYROXINE 88 MCG TABLET] 90 tablet 0     Sig: TAKE 1 TABLET BY MOUTH BEFORE BREAKFAST.         Please approve if appropriate.     Thank you!

## 2024-07-19 ENCOUNTER — LAB ENCOUNTER (OUTPATIENT)
Dept: LAB | Facility: HOSPITAL | Age: 64
End: 2024-07-19
Attending: FAMILY MEDICINE
Payer: MEDICARE

## 2024-07-19 DIAGNOSIS — E03.9 HYPOTHYROIDISM, UNSPECIFIED TYPE: ICD-10-CM

## 2024-07-19 DIAGNOSIS — E83.52 HYPERCALCEMIA: ICD-10-CM

## 2024-07-19 LAB
PTH-INTACT SERPL-MCNC: 68 PG/ML (ref 18.5–88)
T4 FREE SERPL-MCNC: 1.4 NG/DL (ref 0.8–1.7)
TSI SER-ACNC: 0.98 MIU/ML (ref 0.55–4.78)

## 2024-07-19 PROCEDURE — 36415 COLL VENOUS BLD VENIPUNCTURE: CPT

## 2024-07-19 PROCEDURE — 82330 ASSAY OF CALCIUM: CPT

## 2024-07-19 PROCEDURE — 84439 ASSAY OF FREE THYROXINE: CPT

## 2024-07-19 PROCEDURE — 84443 ASSAY THYROID STIM HORMONE: CPT

## 2024-07-19 PROCEDURE — 83970 ASSAY OF PARATHORMONE: CPT

## 2024-07-22 LAB — LC CALCIUM, IONIZED: 5.2 MG/DL

## 2024-08-01 ENCOUNTER — OFFICE VISIT (OUTPATIENT)
Dept: FAMILY MEDICINE CLINIC | Facility: CLINIC | Age: 64
End: 2024-08-01
Payer: MEDICARE

## 2024-08-01 VITALS
TEMPERATURE: 98 F | SYSTOLIC BLOOD PRESSURE: 124 MMHG | HEART RATE: 96 BPM | DIASTOLIC BLOOD PRESSURE: 70 MMHG | WEIGHT: 150 LBS | RESPIRATION RATE: 16 BRPM | OXYGEN SATURATION: 98 % | BODY MASS INDEX: 27 KG/M2

## 2024-08-01 DIAGNOSIS — R52 BODY ACHES: ICD-10-CM

## 2024-08-01 DIAGNOSIS — U07.1 COVID-19: Primary | ICD-10-CM

## 2024-08-01 DIAGNOSIS — J02.9 SORE THROAT: ICD-10-CM

## 2024-08-01 DIAGNOSIS — R51.9 ACUTE NONINTRACTABLE HEADACHE, UNSPECIFIED HEADACHE TYPE: ICD-10-CM

## 2024-08-01 DIAGNOSIS — Z20.822 EXPOSURE TO COVID-19 VIRUS: ICD-10-CM

## 2024-08-01 LAB
OPERATOR ID: ABNORMAL
RAPID SARS-COV-2 BY PCR: DETECTED

## 2024-08-01 PROCEDURE — 99213 OFFICE O/P EST LOW 20 MIN: CPT | Performed by: NURSE PRACTITIONER

## 2024-08-01 PROCEDURE — U0002 COVID-19 LAB TEST NON-CDC: HCPCS | Performed by: NURSE PRACTITIONER

## 2024-08-01 NOTE — PATIENT INSTRUCTIONS
Tylenol and Motrin as needed  Rest, push fluids  Mucinex DM over the counter for nasal congestion/cough or plain Mucinex for nasal congestion  START daily antihistamine (Zyrtec, Claritin, Allegra) and choose one of those. Take daily for 1-2 weeks to help with any post nasal drainage/sore throat  START Flonase nasal spray daily. 1 spray in each nostril  Follow CDC guidelines for quarantine (24 hours fever free and symptoms improving)  Take Paxlovid as prescribed. Hold Atorvastatin while taking this medication and for 5 days after completion.   Return to care for new/worsening symptoms

## 2024-08-01 NOTE — PROGRESS NOTES
Subjective:   Patient ID: Che Cabrera is a 64 year old female.    Patient is a 64 year old female who presents today with complaints of sore throat, headaches and body aches x last night. Denies fevers, chills, runny nose, nasal congestion, cough, ear pain, n/v/d, abdominal pain or rashes. Tolerating PO well at home. Attempted treatment prior to arrival = none.  currently has COVID.        History/Other:   Review of Systems   Constitutional:  Negative for appetite change, chills and fever.   HENT:  Positive for sore throat. Negative for congestion, ear pain and rhinorrhea.    Respiratory:  Negative for cough.    Gastrointestinal:  Negative for abdominal pain, diarrhea, nausea and vomiting.   Musculoskeletal:  Positive for myalgias.   Skin:  Negative for rash.   Neurological:  Positive for headaches.     Current Outpatient Medications   Medication Sig Dispense Refill    nirmatrelvir-ritonavir 150-100 MG Oral Tablet Therapy Pack Take one nirmatrelvir tablet (150mg) with one ritonavir tablet (100mg) together twice daily for 5 days.   (Nirmatrelvir dose is renally adjusted) 20 tablet 0    LEVOTHYROXINE 88 MCG Oral Tab TAKE 1 TABLET BY MOUTH BEFORE BREAKFAST. 90 tablet 0    amitriptyline 25 MG Oral Tab Take 1 tablet (25 mg total) by mouth nightly. 90 tablet 3    FLUoxetine 20 MG Oral Cap Take 1 capsule (20 mg total) by mouth daily. 90 capsule 1    VALSARTAN-HYDROCHLOROTHIAZIDE 160-25 MG Oral Tab TAKE 1 TABLET BY MOUTH EVERY DAY 90 tablet 1    AMLODIPINE 10 MG Oral Tab TAKE 1 TABLET BY MOUTH EVERY DAY 90 tablet 1    omega-3-acid ethyl esters 1 g Oral Cap TAKE 4 CAPSULES (4 G TOTAL) BY MOUTH DAILY. 360 capsule 0    famotidine 20 MG Oral Tab TAKE 1 TABLET BY MOUTH TWICE A  tablet 1    ATORVASTATIN 10 MG Oral Tab TAKE 1 TABLET BY MOUTH EVERY DAY 90 tablet 1    omeprazole 20 MG Oral Capsule Delayed Release Take 1 capsule (20 mg total) by mouth Before Dinner. 90 capsule 3    butalbital-acetaminophen-caffeine  -40 MG Oral Tab TAKE 1- 2 TABLETS BY MOUTH EVERY 6 HOURS AS NEEDED FOR HEADACHE, LIMIT USE TO 3 TIMES A WEEK 30 tablet 5    Multiple Vitamin (MULTIVITAMIN OR) Take  by mouth.      Cholecalciferol (VITAMIN D) 2000 UNIT Oral Tab Take by mouth. One 5,000 unit, one 2,000 unit daily       Calcium Carbonate-Vitamin D (CALCIUM + D OR) None Entered       Allergies:  Allergies   Allergen Reactions    Milk Thistle      Powd     Peanuts      Derived Products     /70   Pulse 96   Temp 98.4 °F (36.9 °C) (Oral)   Resp 16   Wt 150 lb (68 kg)   SpO2 98%   BMI 27.43 kg/m²     Objective:   Physical Exam  Vitals reviewed.   Constitutional:       General: She is awake. She is not in acute distress.     Appearance: Normal appearance. She is well-developed and well-groomed. She is not ill-appearing, toxic-appearing or diaphoretic.   HENT:      Head: Normocephalic and atraumatic.      Right Ear: Tympanic membrane, ear canal and external ear normal.      Left Ear: Tympanic membrane, ear canal and external ear normal.      Nose: Nose normal.      Mouth/Throat:      Lips: Pink.      Mouth: Mucous membranes are moist. No oral lesions.      Pharynx: Oropharynx is clear. Uvula midline.   Cardiovascular:      Rate and Rhythm: Normal rate and regular rhythm.   Pulmonary:      Effort: Pulmonary effort is normal. No respiratory distress.      Breath sounds: Normal breath sounds and air entry. No decreased breath sounds, wheezing, rhonchi or rales.   Lymphadenopathy:      Cervical: No cervical adenopathy.   Skin:     General: Skin is warm and dry.   Neurological:      Mental Status: She is alert and oriented to person, place, and time.   Psychiatric:         Behavior: Behavior is cooperative.         Assessment & Plan:   1. COVID-19    2. Sore throat    3. Body aches    4. Acute nonintractable headache, unspecified headache type    5. Exposure to COVID-19 virus        Orders Placed This Encounter   Procedures    Rapid Covid-19      Results for orders placed or performed in visit on 08/01/24   Rapid Covid-19    Collection Time: 08/01/24 12:14 PM    Specimen: Nares   Result Value Ref Range    Rapid SARS-CoV-2 by PCR Detected (A) Not Detected    POCT Lot Number [id number]     POCT Expiration Date 12/18/25     POCT Procedure Control Control Valid Control Valid     ,502        Meds This Visit:  Requested Prescriptions     Signed Prescriptions Disp Refills    nirmatrelvir-ritonavir 150-100 MG Oral Tablet Therapy Pack 20 tablet 0     Sig: Take one nirmatrelvir tablet (150mg) with one ritonavir tablet (100mg) together twice daily for 5 days.   (Nirmatrelvir dose is renally adjusted)     Reviewed POC test results with patient.  Reassuring physical exam findings. Vitals WNL. No sign of bacterial etiology, RDS or dehydration at this time.  Reviewed risks/benefits of Paxlovid. Patient symptoms very mild at this time but would like Rx in case she worsens in the next 1-2 days.   GFR from 5/6/24 was 43. Renal dose sent. Reviewed to hold statin while taking and for 5 days after completion.  Supportive care and return to care measures reviewed.  Patient v/u and is comfortable with this plan.    Patient Instructions   Tylenol and Motrin as needed  Rest, push fluids  Mucinex DM over the counter for nasal congestion/cough or plain Mucinex for nasal congestion  START daily antihistamine (Zyrtec, Claritin, Allegra) and choose one of those. Take daily for 1-2 weeks to help with any post nasal drainage/sore throat  START Flonase nasal spray daily. 1 spray in each nostril  Follow CDC guidelines for quarantine (24 hours fever free and symptoms improving)  Take Paxlovid as prescribed. Hold Atorvastatin while taking this medication and for 5 days after completion.   Return to care for new/worsening symptoms

## 2024-09-18 RX ORDER — OMEGA-3-ACID ETHYL ESTERS 1 G/1
4 CAPSULE, LIQUID FILLED ORAL DAILY
Qty: 360 CAPSULE | Refills: 0 | Status: SHIPPED | OUTPATIENT
Start: 2024-09-18

## 2024-10-03 RX ORDER — LEVOTHYROXINE SODIUM 88 UG/1
88 TABLET ORAL
Qty: 30 TABLET | Refills: 0 | Status: SHIPPED | OUTPATIENT
Start: 2024-10-03 | End: 2024-11-05

## 2024-10-03 NOTE — TELEPHONE ENCOUNTER
Did not pass protocol  Last office visit 4/11/2024  Last refill was: , 7/5/2024  90__tabs  Next appointment: 11/5/2024    Please sign pended medication if appropriate     Medication Quantity Refills Start End   LEVOTHYROXINE 88 MCG Oral Tab 90 tablet 0 7/5/2024 --   Sig:   TAKE 1 TABLET BY MOUTH BEFORE BREAKFAST.     Route:   Oral

## 2024-10-08 NOTE — TELEPHONE ENCOUNTER
Did not pass protocol  Last office visit 4/11/2024  Last refill was: , _4/25/2024 90_tabs  Next appointment: none scheduled     Please sign pended medication if appropriate     Medication Quantity Refills Start End   FLUoxetine 20 MG Oral Cap 90 capsule 1 4/25/2024 --   Sig:   Take 1 capsule (20 mg total) by mouth daily.     Route:   Oral         Please call patient to schedule medication follow up then route to Dr. Mcdonald

## 2024-10-11 RX ORDER — ATORVASTATIN CALCIUM 10 MG/1
10 TABLET, FILM COATED ORAL DAILY
Qty: 30 TABLET | Refills: 0 | Status: SHIPPED | OUTPATIENT
Start: 2024-10-11

## 2024-10-11 NOTE — TELEPHONE ENCOUNTER
Last Office Visit: 04/11/2024    Last Refill:   Medication Quantity Refills Start End   ATORVASTATIN 10 MG Oral Tab 90 tablet 1 4/12/2024 --     Return to Clinic: harinder ny 11/05/2024    Protocol: passed

## 2024-11-05 ENCOUNTER — OFFICE VISIT (OUTPATIENT)
Dept: FAMILY MEDICINE CLINIC | Facility: CLINIC | Age: 64
End: 2024-11-05
Payer: MEDICARE

## 2024-11-05 ENCOUNTER — LAB ENCOUNTER (OUTPATIENT)
Dept: LAB | Age: 64
End: 2024-11-05
Attending: NURSE PRACTITIONER
Payer: MEDICARE

## 2024-11-05 ENCOUNTER — LAB ENCOUNTER (OUTPATIENT)
Dept: LAB | Age: 64
End: 2024-11-05
Attending: INTERNAL MEDICINE
Payer: MEDICARE

## 2024-11-05 VITALS
DIASTOLIC BLOOD PRESSURE: 72 MMHG | HEIGHT: 62.01 IN | RESPIRATION RATE: 16 BRPM | HEART RATE: 68 BPM | WEIGHT: 154.63 LBS | SYSTOLIC BLOOD PRESSURE: 114 MMHG | TEMPERATURE: 98 F | BODY MASS INDEX: 28.1 KG/M2

## 2024-11-05 DIAGNOSIS — K21.9 CHRONIC GERD: ICD-10-CM

## 2024-11-05 DIAGNOSIS — E78.5 HYPERLIPIDEMIA, UNSPECIFIED HYPERLIPIDEMIA TYPE: ICD-10-CM

## 2024-11-05 DIAGNOSIS — R79.89 ELEVATED SERUM CREATININE: ICD-10-CM

## 2024-11-05 DIAGNOSIS — E04.1 THYROID NODULE: ICD-10-CM

## 2024-11-05 DIAGNOSIS — E03.9 HYPOTHYROIDISM, UNSPECIFIED TYPE: ICD-10-CM

## 2024-11-05 DIAGNOSIS — R77.1 ELEVATED SERUM GLOBULIN LEVEL: ICD-10-CM

## 2024-11-05 DIAGNOSIS — R77.8 ELEVATED TOTAL PROTEIN: ICD-10-CM

## 2024-11-05 DIAGNOSIS — R73.03 PREDIABETES: ICD-10-CM

## 2024-11-05 DIAGNOSIS — I10 BENIGN ESSENTIAL HYPERTENSION: Primary | ICD-10-CM

## 2024-11-05 DIAGNOSIS — D47.2 MONOCLONAL GAMMOPATHY: ICD-10-CM

## 2024-11-05 DIAGNOSIS — F43.21 GRIEF REACTION: ICD-10-CM

## 2024-11-05 DIAGNOSIS — R79.9 ELEVATED BUN: ICD-10-CM

## 2024-11-05 LAB
ALBUMIN SERPL-MCNC: 4.9 G/DL (ref 3.2–4.8)
ALBUMIN/GLOB SERPL: 1.1 {RATIO} (ref 1–2)
ALP LIVER SERPL-CCNC: 87 U/L
ALT SERPL-CCNC: 36 U/L
ANION GAP SERPL CALC-SCNC: 8 MMOL/L (ref 0–18)
AST SERPL-CCNC: 38 U/L (ref ?–34)
BASOPHILS # BLD AUTO: 0.04 X10(3) UL (ref 0–0.2)
BASOPHILS NFR BLD AUTO: 0.5 %
BILIRUB SERPL-MCNC: 0.5 MG/DL (ref 0.2–1.1)
BUN BLD-MCNC: 24 MG/DL (ref 9–23)
CALCIUM BLD-MCNC: 11 MG/DL (ref 8.7–10.4)
CALCIUM BLD-MCNC: 11.3 MG/DL (ref 8.7–10.4)
CHLORIDE SERPL-SCNC: 103 MMOL/L (ref 98–112)
CHOLEST SERPL-MCNC: 184 MG/DL (ref ?–200)
CO2 SERPL-SCNC: 26 MMOL/L (ref 21–32)
CREAT BLD-MCNC: 1.15 MG/DL
CREAT BLD-MCNC: 1.18 MG/DL
EGFRCR SERPLBLD CKD-EPI 2021: 51 ML/MIN/1.73M2 (ref 60–?)
EOSINOPHIL # BLD AUTO: 0.08 X10(3) UL (ref 0–0.7)
EOSINOPHIL NFR BLD AUTO: 1.1 %
ERYTHROCYTE [DISTWIDTH] IN BLOOD BY AUTOMATED COUNT: 12.1 %
EST. AVERAGE GLUCOSE BLD GHB EST-MCNC: 123 MG/DL (ref 68–126)
FASTING PATIENT LIPID ANSWER: YES
FASTING STATUS PATIENT QL REPORTED: YES
GLOBULIN PLAS-MCNC: 4.6 G/DL (ref 2–3.5)
GLUCOSE BLD-MCNC: 108 MG/DL (ref 70–99)
HBA1C MFR BLD: 5.9 % (ref ?–5.7)
HCT VFR BLD AUTO: 42.9 %
HDLC SERPL-MCNC: 51 MG/DL (ref 40–59)
HGB BLD-MCNC: 14.9 G/DL
IGA SERPL-MCNC: 111.4 MG/DL (ref 40–350)
IGM SERPL-MCNC: 116.4 MG/DL (ref 50–300)
IMM GRANULOCYTES # BLD AUTO: 0.02 X10(3) UL (ref 0–1)
IMM GRANULOCYTES NFR BLD: 0.3 %
IMMUNOGLOBULIN PNL SER-MCNC: 2098 MG/DL (ref 650–1600)
LDH SERPL L TO P-CCNC: 238 U/L
LDLC SERPL CALC-MCNC: 108 MG/DL (ref ?–100)
LYMPHOCYTES # BLD AUTO: 2.28 X10(3) UL (ref 1–4)
LYMPHOCYTES NFR BLD AUTO: 30.7 %
MCH RBC QN AUTO: 31.5 PG (ref 26–34)
MCHC RBC AUTO-ENTMCNC: 34.7 G/DL (ref 31–37)
MCV RBC AUTO: 90.7 FL
MONOCYTES # BLD AUTO: 0.36 X10(3) UL (ref 0.1–1)
MONOCYTES NFR BLD AUTO: 4.8 %
NEUTROPHILS # BLD AUTO: 4.65 X10 (3) UL (ref 1.5–7.7)
NEUTROPHILS # BLD AUTO: 4.65 X10(3) UL (ref 1.5–7.7)
NEUTROPHILS NFR BLD AUTO: 62.6 %
NONHDLC SERPL-MCNC: 133 MG/DL (ref ?–130)
OSMOLALITY SERPL CALC.SUM OF ELEC: 289 MOSM/KG (ref 275–295)
PHOSPHATE SERPL-MCNC: 2.5 MG/DL (ref 2.4–5.1)
PLATELET # BLD AUTO: 277 10(3)UL (ref 150–450)
POTASSIUM SERPL-SCNC: 3.7 MMOL/L (ref 3.5–5.1)
PROT SERPL-MCNC: 9.5 G/DL (ref 5.7–8.2)
PTH-INTACT SERPL-MCNC: 60.2 PG/ML (ref 18.5–88)
RBC # BLD AUTO: 4.73 X10(6)UL
SODIUM SERPL-SCNC: 137 MMOL/L (ref 136–145)
T4 FREE SERPL-MCNC: 1.5 NG/DL (ref 0.8–1.7)
TRIGL SERPL-MCNC: 139 MG/DL (ref 30–149)
TSI SER-ACNC: 6.4 UIU/ML (ref 0.55–4.78)
VLDLC SERPL CALC-MCNC: 24 MG/DL (ref 0–30)
WBC # BLD AUTO: 7.4 X10(3) UL (ref 4–11)

## 2024-11-05 PROCEDURE — 85025 COMPLETE CBC W/AUTO DIFF WBC: CPT

## 2024-11-05 PROCEDURE — 36415 COLL VENOUS BLD VENIPUNCTURE: CPT

## 2024-11-05 PROCEDURE — 84165 PROTEIN E-PHORESIS SERUM: CPT

## 2024-11-05 PROCEDURE — 80061 LIPID PANEL: CPT

## 2024-11-05 PROCEDURE — 84443 ASSAY THYROID STIM HORMONE: CPT

## 2024-11-05 PROCEDURE — 86334 IMMUNOFIX E-PHORESIS SERUM: CPT

## 2024-11-05 PROCEDURE — 82565 ASSAY OF CREATININE: CPT

## 2024-11-05 PROCEDURE — 82784 ASSAY IGA/IGD/IGG/IGM EACH: CPT

## 2024-11-05 PROCEDURE — 80053 COMPREHEN METABOLIC PANEL: CPT

## 2024-11-05 PROCEDURE — 83521 IG LIGHT CHAINS FREE EACH: CPT

## 2024-11-05 PROCEDURE — 84100 ASSAY OF PHOSPHORUS: CPT

## 2024-11-05 PROCEDURE — 82310 ASSAY OF CALCIUM: CPT

## 2024-11-05 PROCEDURE — 84439 ASSAY OF FREE THYROXINE: CPT

## 2024-11-05 PROCEDURE — 99214 OFFICE O/P EST MOD 30 MIN: CPT | Performed by: NURSE PRACTITIONER

## 2024-11-05 PROCEDURE — 83970 ASSAY OF PARATHORMONE: CPT

## 2024-11-05 PROCEDURE — G2211 COMPLEX E/M VISIT ADD ON: HCPCS | Performed by: NURSE PRACTITIONER

## 2024-11-05 PROCEDURE — 83036 HEMOGLOBIN GLYCOSYLATED A1C: CPT

## 2024-11-05 PROCEDURE — 83615 LACTATE (LD) (LDH) ENZYME: CPT

## 2024-11-05 RX ORDER — FAMOTIDINE 20 MG/1
20 TABLET, FILM COATED ORAL 2 TIMES DAILY
Qty: 180 TABLET | Refills: 1 | Status: SHIPPED | OUTPATIENT
Start: 2024-11-05

## 2024-11-05 RX ORDER — OMEGA-3-ACID ETHYL ESTERS 1 G/1
4 CAPSULE, LIQUID FILLED ORAL DAILY
Qty: 360 CAPSULE | Refills: 0 | Status: SHIPPED | OUTPATIENT
Start: 2024-11-05

## 2024-11-05 RX ORDER — LEVOTHYROXINE SODIUM 88 UG/1
88 TABLET ORAL
Qty: 90 TABLET | Refills: 1 | Status: SHIPPED | OUTPATIENT
Start: 2024-11-05 | End: 2024-11-06

## 2024-11-05 RX ORDER — ATORVASTATIN CALCIUM 10 MG/1
10 TABLET, FILM COATED ORAL DAILY
Qty: 90 TABLET | Refills: 1 | Status: SHIPPED | OUTPATIENT
Start: 2024-11-05

## 2024-11-05 RX ORDER — AMLODIPINE BESYLATE 10 MG/1
10 TABLET ORAL DAILY
Qty: 90 TABLET | Refills: 1 | Status: SHIPPED | OUTPATIENT
Start: 2024-11-05

## 2024-11-05 RX ORDER — VALSARTAN AND HYDROCHLOROTHIAZIDE 160; 25 MG/1; MG/1
1 TABLET ORAL DAILY
Qty: 90 TABLET | Refills: 1 | Status: SHIPPED | OUTPATIENT
Start: 2024-11-05

## 2024-11-05 NOTE — PROGRESS NOTES
Che Cabrera is a 64 year old female.  HPI:   Medication check/follow up. She has been taking her medications as ordered. Denies any side effects of medications. Mood overall has been \"good\". No thoughts of harming self/others. No chest pain or shortness of breath.  Daughter passed away 12 years ago, her grandson lives with her- he is now 21 years old.  Received Influenza vaccine this season. Received Prevnar earlier this year. Due Tdap- will receive at pharmacy.   Due for labs, fasting today.  Sees Dr. Urias- appt tomorrow. Monoclonal gammopathy.     Wt Readings from Last 6 Encounters:   11/05/24 154 lb 9.6 oz (70.1 kg)   08/01/24 150 lb (68 kg)   05/08/24 156 lb 6.4 oz (70.9 kg)   05/08/24 157 lb (71.2 kg)   04/11/24 156 lb 12.8 oz (71.1 kg)   01/24/24 159 lb 6.4 oz (72.3 kg)     Current Outpatient Medications   Medication Sig Dispense Refill    atorvastatin 10 MG Oral Tab Take 1 tablet (10 mg total) by mouth daily. 90 tablet 1    FLUoxetine 20 MG Oral Cap Take 1 capsule (20 mg total) by mouth daily. 90 capsule 1    levothyroxine 88 MCG Oral Tab Take 1 tablet (88 mcg total) by mouth before breakfast. 90 tablet 1    omega-3-acid ethyl esters 1 g Oral Cap Take 4 capsules (4 g total) by mouth daily. 360 capsule 0    amLODIPine 10 MG Oral Tab Take 1 tablet (10 mg total) by mouth daily. 90 tablet 1    famotidine 20 MG Oral Tab Take 1 tablet (20 mg total) by mouth 2 (two) times daily. 180 tablet 1    Valsartan-hydroCHLOROthiazide 160-25 MG Oral Tab Take 1 tablet by mouth daily. 90 tablet 1    amitriptyline 25 MG Oral Tab Take 1 tablet (25 mg total) by mouth nightly. 90 tablet 3    butalbital-acetaminophen-caffeine -40 MG Oral Tab TAKE 1- 2 TABLETS BY MOUTH EVERY 6 HOURS AS NEEDED FOR HEADACHE, LIMIT USE TO 3 TIMES A WEEK 30 tablet 5    Multiple Vitamin (MULTIVITAMIN OR) Take  by mouth.      Cholecalciferol (VITAMIN D) 2000 UNIT Oral Tab Take by mouth. One 5,000 unit, one 2,000 unit daily       Calcium  Carbonate-Vitamin D (CALCIUM + D OR) None Entered        Past Medical History:    Arthritis    Decorative tattoo    Hx estrogen therapy    HRT    Hx of colposcopy with cervical biopsy    HYPERTHYROIDISM    Kidney stone    Thyroid disease    Unspecified essential hypertension      Social History:  Social History     Socioeconomic History    Marital status:     Number of children: 1   Tobacco Use    Smoking status: Former     Current packs/day: 0.00     Average packs/day: 1 pack/day for 28.0 years (28.0 ttl pk-yrs)     Types: Cigarettes     Start date: 1972     Quit date: 2000     Years since quittin.2    Smokeless tobacco: Never    Tobacco comments:     Quit   Vaping Use    Vaping status: Never Used   Substance and Sexual Activity    Alcohol use: No    Drug use: Yes     Types: Cannabis     Comment: THC gummies for sleep occ   Other Topics Concern    Caffeine Concern No     Comment: drinks Coke Zero    Exercise No     Comment: 6000 steps a day walking        REVIEW OF SYSTEMS:   GENERAL HEALTH: feels well otherwise  RESPIRATORY: denies shortness of breath with exertion  CARDIOVASCULAR: denies chest pain on exertion  GI: denies abdominal pain and reports chronic heartburn  NEURO: denies headaches  Musculoskeletal: No motor deficits  EXAM:   /72   Pulse 68   Temp 97.5 °F (36.4 °C) (Temporal)   Resp 16   Ht 5' 2.01\" (1.575 m)   Wt 154 lb 9.6 oz (70.1 kg)   BMI 28.27 kg/m²   GENERAL: well developed, well nourished,in no apparent distress  HEENT: TM clear bilaterally, throat clear no erythema without mass.   EYES: PERRLA, EOM intact, sclera clear without injection  NECK: supple,no adenopathy, + thyroid nodule  LUNGS: clear to auscultation no rales, rhonchi or wheezes  CARDIO: RRR without murmur  GI: Normal bowel sounds ×4 quadrant, no hepatosplenomegaly or masses, and no tenderness   EXTREMITIES: no cyanosis, clubbing or edema +2 posterior tibial pulses  Musculoskeletal: No gross  deficit  Psychological: Mood and affect are normal.  Good communication skills.  ASSESSMENT AND PLAN:     Encounter Diagnoses   Name Primary?    Benign essential hypertension Yes    Hyperlipidemia, unspecified hyperlipidemia type     Thyroid nodule     Hypothyroidism, unspecified type     Grief reaction     Prediabetes     Chronic GERD     Monoclonal gammopathy        1. Benign essential hypertension  - amLODIPine 10 MG Oral Tab; Take 1 tablet (10 mg total) by mouth daily.  Dispense: 90 tablet; Refill: 1  - Valsartan-hydroCHLOROthiazide 160-25 MG Oral Tab; Take 1 tablet by mouth daily.  Dispense: 90 tablet; Refill: 1    2. Hyperlipidemia, unspecified hyperlipidemia type  - atorvastatin 10 MG Oral Tab; Take 1 tablet (10 mg total) by mouth daily.  Dispense: 90 tablet; Refill: 1  - TSH and Free T4; Future  - Lipid Panel; Future  - Comp Metabolic Panel (14); Future    3. Thyroid nodule    4. Hypothyroidism, unspecified type  - levothyroxine 88 MCG Oral Tab; Take 1 tablet (88 mcg total) by mouth before breakfast.  Dispense: 90 tablet; Refill: 1    5. Grief reaction  - FLUoxetine 20 MG Oral Cap; Take 1 capsule (20 mg total) by mouth daily.  Dispense: 90 capsule; Refill: 1    6. Prediabetes  - Hemoglobin A1C [E]; Future    7. Chronic GERD  - famotidine 20 MG Oral Tab; Take 1 tablet (20 mg total) by mouth 2 (two) times daily.  Dispense: 180 tablet; Refill: 1    8. Monoclonal gammopathy       Orders Placed This Encounter   Procedures    TSH and Free T4    Lipid Panel    Comp Metabolic Panel (14)    Hemoglobin A1C [E]       Meds & Refills for this Visit:  Requested Prescriptions     Signed Prescriptions Disp Refills    atorvastatin 10 MG Oral Tab 90 tablet 1     Sig: Take 1 tablet (10 mg total) by mouth daily.    FLUoxetine 20 MG Oral Cap 90 capsule 1     Sig: Take 1 capsule (20 mg total) by mouth daily.    levothyroxine 88 MCG Oral Tab 90 tablet 1     Sig: Take 1 tablet (88 mcg total) by mouth before breakfast.     omega-3-acid ethyl esters 1 g Oral Cap 360 capsule 0     Sig: Take 4 capsules (4 g total) by mouth daily.    amLODIPine 10 MG Oral Tab 90 tablet 1     Sig: Take 1 tablet (10 mg total) by mouth daily.    famotidine 20 MG Oral Tab 180 tablet 1     Sig: Take 1 tablet (20 mg total) by mouth 2 (two) times daily.    Valsartan-hydroCHLOROthiazide 160-25 MG Oral Tab 90 tablet 1     Sig: Take 1 tablet by mouth daily.       Imaging & Consults:  None    Follow Up with:  No follow-up provider specified.    Discussed Tdap- will follow up at pharmacy.  Labs today.  Continue medications as ordered. Will adjust Levothyroxine and Atorvastatin if needed pending results.  Continue to focus on healthy eating, routine exercise, weight loss.  Thyroid US reviewed.  Med check/follow up 6 months.    The patient indicates understanding of these issues and agrees to the plan.  The patient is asked to return in 6 months.

## 2024-11-06 ENCOUNTER — OFFICE VISIT (OUTPATIENT)
Dept: HEMATOLOGY/ONCOLOGY | Facility: HOSPITAL | Age: 64
End: 2024-11-06
Attending: INTERNAL MEDICINE
Payer: MEDICARE

## 2024-11-06 ENCOUNTER — ORDER TRANSCRIPTION (OUTPATIENT)
Dept: ADMINISTRATIVE | Facility: HOSPITAL | Age: 64
End: 2024-11-06

## 2024-11-06 VITALS
HEART RATE: 88 BPM | TEMPERATURE: 98 F | OXYGEN SATURATION: 97 % | BODY MASS INDEX: 28.1 KG/M2 | SYSTOLIC BLOOD PRESSURE: 157 MMHG | WEIGHT: 154.63 LBS | HEIGHT: 62.01 IN | DIASTOLIC BLOOD PRESSURE: 89 MMHG

## 2024-11-06 DIAGNOSIS — D47.2 MONOCLONAL GAMMOPATHY: Primary | ICD-10-CM

## 2024-11-06 DIAGNOSIS — E83.52 HYPERCALCEMIA: ICD-10-CM

## 2024-11-06 DIAGNOSIS — E83.52 SERUM CALCIUM ELEVATED: Primary | ICD-10-CM

## 2024-11-06 DIAGNOSIS — Z13.6 SCREENING FOR CARDIOVASCULAR CONDITION: Primary | ICD-10-CM

## 2024-11-06 PROCEDURE — 99214 OFFICE O/P EST MOD 30 MIN: CPT | Performed by: INTERNAL MEDICINE

## 2024-11-06 RX ORDER — LEVOTHYROXINE SODIUM 100 UG/1
100 TABLET ORAL DAILY
COMMUNITY
Start: 2024-11-06

## 2024-11-06 NOTE — PROGRESS NOTES
Patient is here for 6 month f/u for monoclonal gammopathy. Patient denies fever, cough or pain. Patient denies bleeding issues.     Education Record    Learner:  Patient    Disease / Diagnosis: monoclonal gammopathy     Barriers / Limitations:  None   Comments:    Method:  Discussion   Comments:    General Topics:  Medication, Pain, Side effects and symptom management, and Plan of care reviewed   Comments:    Outcome:  Shows understanding   Comments:

## 2024-11-06 NOTE — PROGRESS NOTES
Cancer Center Progress Note    Problem List:      Patient Active Problem List   Diagnosis    Symptomatic menopausal or female climacteric states    Allergy, unspecified not elsewhere classified    HPV (human papilloma virus) infection    Grief reaction    Benign essential hypertension    Hypothyroidism    Migraine    Multinodular goiter    Monoclonal gammopathy    Osteopenia    Hyperlipidemia    Glucose intolerance    History of cervical dysplasia    Family history of colon cancer    Special screening for malignant neoplasm of colon    Dysphagia, unspecified       Interim History:    Che Cabrera presents today for evaluation and management of a diagnosis of monoclonal gammopathy (IgG lambda).    She is doing well. She has no pain. She has no dyspnea or cough. She has no fever or sweats. She has no other complaints.    She is being followed for monoclonal gammopathy of undetermined significance. She has an IgG lambda protein. She has had recent mild hypercalcemia. She had a low dose CT of the body in 6/2024 that was negative for lytic changes.       Bone marrow biopsy from 2/9/2015:  Final Diagnosis:  Peripheral blood smear, bone marrow aspiration, touch preparation, clot  and biopsy:  -Overall normocellular bone marrow with active trilineage hematopoiesis.  -Plasma cells comprise 2% of all nucleated cells.    Comment:  The bone marrow cellularity is overall normal for the patient's stated  age.  There is active trilineage hematopoiesis.  There are no  significant dysplastic changes or increase in blasts.  Plasma cells  comprise approximately 2% of all nucleated cells.  Morphologically, they  appear small and mature.  No aggregates or sheets of plasma cells are  identified.  There are no abnormal lymphoid aggregates.    Flow cytometry shows no monotypic B-cell population or aberrant T-cell  phenotype. Plasma cells appear polytypic.    Immunohistochemistry highlights scattered plasma cells comprising less  than  5% with .  CD3 highlights the T-lymphocytes which predominate,  while CD20 highlights a few, scattered small B-lymphocytes.    Review of Systems:   Constitutional: Negative for anorexia, fatigue, fevers, chills, night sweats and weight loss.  Eyes: Negative for visual disturbance, irritation and redness.  Respiratory: Negative for cough, hemoptysis, chest pain, or dyspnea.  Cardiovascular: Negative for angina, orthopnea or palpitations.  Gastrointestinal: Negative for nausea, vomiting, change in bowel habits, diarrhea, constipation and abdominal pain.  Integument/breast: Negative for rash, skin lesions, and pruritus.  Hematologic/lymphatic: Negative for easy bruising, bleeding, and lymphadenopathy.  Musculoskeletal: Negative for myalgias, arthralgias, muscle weakness.  Genitourinary: Negative for dysuria or hematuria  Neurological: Negative for headaches, dizziness, speech problems, gait problems and focal weakness.  Psychiatric: The patient's mood was calm and appropriate for this visit.  The pertinent positives and negatives were described. All other systems were negative.    PMH/PSH:  Past Medical History:    Arthritis    Decorative tattoo    Hx estrogen therapy    HRT    Hx of colposcopy with cervical biopsy    HYPERTHYROIDISM    Kidney stone    Thyroid disease    Unspecified essential hypertension       Past Surgical History:   Procedure Laterality Date    Appendectomy  11/20/2011    Laproscopic    Colonoscopy  10/21/2010    Repeat 5 years, No polyps     Dexa, axial site (spine, hips, pelvis)  07/26/2010    Vine3xcep, screening  10/2010    Pap smear  09/30/2010    Upper gi endoscopy performed         Family History Reviewed:  Family History   Problem Relation Age of Onset    Hypertension Mother     Heart Attack Mother         AMI    Other (COPD) Mother     Other (MS) Mother     Cancer Maternal Grandmother         Colon    Stroke Sister        Allergies:     Allergies   Allergen Reactions    Milk Thistle       Powd     Peanuts      Derived Products       Medications:   levothyroxine 100 MCG Oral Tab Take 1 tablet (100 mcg total) by mouth daily.      atorvastatin 10 MG Oral Tab Take 1 tablet (10 mg total) by mouth daily. 90 tablet 1    FLUoxetine 20 MG Oral Cap Take 1 capsule (20 mg total) by mouth daily. 90 capsule 1    omega-3-acid ethyl esters 1 g Oral Cap Take 4 capsules (4 g total) by mouth daily. 360 capsule 0    amLODIPine 10 MG Oral Tab Take 1 tablet (10 mg total) by mouth daily. 90 tablet 1    famotidine 20 MG Oral Tab Take 1 tablet (20 mg total) by mouth 2 (two) times daily. 180 tablet 1    Valsartan-hydroCHLOROthiazide 160-25 MG Oral Tab Take 1 tablet by mouth daily. 90 tablet 1    amitriptyline 25 MG Oral Tab Take 1 tablet (25 mg total) by mouth nightly. 90 tablet 3    butalbital-acetaminophen-caffeine -40 MG Oral Tab TAKE 1- 2 TABLETS BY MOUTH EVERY 6 HOURS AS NEEDED FOR HEADACHE, LIMIT USE TO 3 TIMES A WEEK 30 tablet 5    Multiple Vitamin (MULTIVITAMIN OR) Take  by mouth.      Cholecalciferol (VITAMIN D) 2000 UNIT Oral Tab Take by mouth. One 5,000 unit, one 2,000 unit daily       Calcium Carbonate-Vitamin D (CALCIUM + D OR) None Entered           Vital Signs:      Height: 157.5 cm (5' 2.01\") (11/06 1146)  Weight: 70.1 kg (154 lb 9.6 oz) (11/06 1146)  BSA (Calculated - sq m): 1.71 sq meters (11/06 1146)  Pulse: 88 (11/06 1146)  BP: 157/89 (11/06 1146)  Temp: 97.7 °F (36.5 °C) (11/06 1146)  Do Not Use - Resp Rate: --  SpO2: 97 % (11/06 1146)      Performance Status:  ECOG 0: Fully active, able to carry on all pre-disease performance without restriction     Physical Examination:    Constitutional: Patient is alert and oriented x 3, not in acute distress.  Eyes: Anicteric sclera, pink conjunctiva.  HEENT:  Oropharynx is clear. Neck is supple.  Respiratory: Clear to auscultation and percussion. No rales.  No wheezes.  Cardiovascular: Regular rate and rhythm. No murmurs.  Gastrointestinal: Soft, non  tender with good bowel sounds.  Musculoskeletal: No edema. No calf tenderness.  Neurological: Grossly intact without focal motor or sensory deficit.  Skin: No suspicious skin lesion, no rash, no ulceration.  Lymphatics: There is no palpable lymphadenopathy throughout in the cervical, supraclavicular, or axillary regions.  Psychiatric: The patient's mood is calm and appropriate for this visit.      Labs reviewed at this visit:     Lab Results   Component Value Date    WBC 7.4 11/05/2024    RBC 4.73 11/05/2024    HGB 14.9 11/05/2024    HCT 42.9 11/05/2024    MCV 90.7 11/05/2024    MCH 31.5 11/05/2024    MCHC 34.7 11/05/2024    RDW 12.1 11/05/2024    .0 11/05/2024    MPV 11.7 (H) 02/29/2012     Lab Results   Component Value Date     11/05/2024    K 3.7 11/05/2024     11/05/2024    CO2 26.0 11/05/2024    BUN 24 (H) 11/05/2024    CREATSERUM 1.18 (H) 11/05/2024    CREATSERUM 1.15 (H) 11/05/2024     (H) 11/05/2024    CA 11.3 (H) 11/05/2024    CA 11.0 (H) 11/05/2024    ALKPHO 87 11/05/2024    ALT 36 11/05/2024    AST 38 (H) 11/05/2024    BILT 0.5 11/05/2024    ALB 4.9 (H) 11/05/2024    TP 9.5 (H) 11/05/2024     Lab Component   Component Value Date/Time     11/05/2024 1054         Component  Ref Range & Units 11/5/24 1054   Immunoglobulin A  40.00 - 350.00 mg/dL 111.40   Immunoglobulin G  650 - 1,600 mg/dL 2,098 High    Immunoglobulin M  50.0 - 300.0 mg/dL 116.4          Radiologic imaging reviewed at this visit:    CT Body on 6/4/2024:  FINDINGS:    SKELETAL LESIONS:    There are no significant typical lytic lesions to suggest multiple myeloma.  For the purposes of CT a lytic lesion is considered significant if measuring 5 mm or greater.  INCIDENTAL FINDINGS AND HEAD:    Mild decreased attenuation in the periventricular white matter is consistent with mild small vessel ischemic change.  INCIDENTAL FINDINGS IN NECK:      There are no significant incidental findings.  INCIDENTAL FINDINGS IN  CHEST:    There are no significant incidental findings.  INCIDENTAL FINDINGS IN ABDOMEN:    Diffuse decreased attenuation in liver parenchyma is compatible with hepatic steatosis.  There is bilateral nonobstructing nephrolithiasis.  INCIDENTAL FINDINGS IN PELVIS:    There are no significant incidental findings.  INCIDENTAL FINDINGS IN SPINE:    There are no significant incidental findings.  INCIDENTAL FINDINGS IN EXTREMITIES:  There are no significant incidental findings.     Impression   CONCLUSION:  1. With respect evaluating for multiple myeloma this study is negative.  2. Incidental findings are noted above.       CT of the abd/pelvis on 11/24/2022:  FINDINGS:     KIDNEYS:  5.6 mm calculus in the left side of the bladder adjacent to the left ureterovesicular junction.  2.8 mm calculus pole the right kidney.   ADRENALS:  No mass or enlargement.     LIVER:  No enlargement, atrophy, abnormal density, or significant focal lesion.     BILIARY:  No visible dilatation or calcification.     PANCREAS:  No lesion, fluid collection, ductal dilatation, or atrophy.     SPLEEN:  No enlargement or focal lesion.     AORTA/VASCULAR:  No aneurysm.     RETROPERITONEUM:  No mass or adenopathy.     BOWEL/MESENTERY:  No visible mass, obstruction, or bowel wall thickening.  Sequelae of appendectomy noted.   ABDOMINAL WALL:  No mass or hernia.     BONES:  No bony lesion or fracture.   PELVIC ORGANS:  Normal for age.     LUNG BASES:  No visible pulmonary or pleural disease.     OTHER:  Negative.        Impression   CONCLUSION:         A 5.6 mm calculus in the bladder adjacent left radicular junction is unchanged.  No hydronephrosis.       Agree with preliminary radiology report from [facility].          Mammogram on 3/17/2022:  BREAST COMPOSITION:   Scattered areas fibroglandular density.       FINDINGS:     RIGHT BREAST:  No significant or suspicious finding.         LEFT BREAST:  No significant or suspicious finding.         Impression   CONCLUSION:     No suspicious change from prior mammography.  No mammographic evidence of malignancy.       BI-RADS CATEGORY:     DIAGNOSTIC CATEGORY 1--NEGATIVE ASSESSMENT.         RECOMMENDATIONS:     ROUTINE MAMMOGRAM AND CLINICAL EVALUATION IN 12 MONTHS.           Assessment/Plan:     IgG lambda monoclonal gammopathy of undetermined significance:     She had a bone marrow biopsy in 2015 with 2% plasma cells. Repeat M protein has been stable but is pending today. The IgG level is normal. She has had fluctuation renal function and borderline hypercalcemia for years. The low dose CT scan of the bones was negative. If the M spike is stable then we will repeat the labs in six months. If this is higher then we would consider repeat bone marrow. I will send the ionized calcium today and a PTH related protein.       Chevy Urias MD

## 2024-11-07 LAB — LC CALCIUM, IONIZED: 5.4 MG/DL

## 2024-11-11 ENCOUNTER — TELEPHONE (OUTPATIENT)
Dept: FAMILY MEDICINE CLINIC | Facility: CLINIC | Age: 64
End: 2024-11-11

## 2024-11-11 LAB
ALBUMIN SERPL ELPH-MCNC: 4.92 G/DL (ref 3.75–5.21)
ALBUMIN/GLOB SERPL: 1.21 {RATIO} (ref 1–2)
ALPHA1 GLOB SERPL ELPH-MCNC: 0.31 G/DL (ref 0.19–0.46)
ALPHA2 GLOB SERPL ELPH-MCNC: 0.88 G/DL (ref 0.48–1.05)
B-GLOBULIN SERPL ELPH-MCNC: 0.91 G/DL (ref 0.68–1.23)
GAMMA GLOB SERPL ELPH-MCNC: 1.98 G/DL (ref 0.62–1.7)
KAPPA LC FREE SER-MCNC: 2.23 MG/DL (ref 0.33–1.94)
KAPPA LC FREE/LAMBDA FREE SER NEPH: 1.36 {RATIO} (ref 0.26–1.65)
LAMBDA LC FREE SERPL-MCNC: 1.64 MG/DL (ref 0.57–2.63)
M PROTEIN 1 SERPL ELPH-MCNC: 1.38 G/DL (ref ?–0)
PROT SERPL-MCNC: 9 G/DL (ref 5.7–8.2)

## 2024-11-11 NOTE — TELEPHONE ENCOUNTER
Patient calling to report a medication she is no longer taking. She is asking for it to be removed from her medication list.     Medication Detail    Medication Quantity Refills Start End   FLUoxetine 20 MG Oral Cap 90 capsule 1 11/5/2024 --   Sig:   Take 1 capsule (20 mg total) by mouth daily.     Route:   Oral     Order #:   [id number]

## 2024-11-13 LAB — PTH-RELATED PEPTIDE: <2 PMOL/L

## 2024-12-14 ENCOUNTER — HOSPITAL ENCOUNTER (OUTPATIENT)
Dept: CT IMAGING | Age: 64
Discharge: HOME | End: 2024-12-14
Attending: NURSE PRACTITIONER

## 2024-12-14 DIAGNOSIS — Z13.6 SCREENING FOR CARDIOVASCULAR CONDITION: ICD-10-CM

## 2024-12-17 ENCOUNTER — LAB ENCOUNTER (OUTPATIENT)
Dept: LAB | Facility: HOSPITAL | Age: 64
End: 2024-12-17
Attending: NURSE PRACTITIONER
Payer: MEDICARE

## 2024-12-17 DIAGNOSIS — R73.9 HYPERGLYCEMIA: Primary | ICD-10-CM

## 2024-12-17 DIAGNOSIS — Z86.39 HISTORY OF VITAMIN D DEFICIENCY: ICD-10-CM

## 2024-12-17 DIAGNOSIS — E03.9 HYPOTHYROIDISM, UNSPECIFIED TYPE: ICD-10-CM

## 2024-12-17 DIAGNOSIS — E87.6 HYPOKALEMIA: Primary | ICD-10-CM

## 2024-12-17 DIAGNOSIS — E83.52 SERUM CALCIUM ELEVATED: ICD-10-CM

## 2024-12-17 DIAGNOSIS — R79.89 ELEVATED SERUM CREATININE: ICD-10-CM

## 2024-12-17 DIAGNOSIS — R79.9 ELEVATED BUN: ICD-10-CM

## 2024-12-17 DIAGNOSIS — E55.9 VITAMIN D DEFICIENCY: ICD-10-CM

## 2024-12-17 LAB
ALBUMIN SERPL-MCNC: 4.7 G/DL (ref 3.2–4.8)
ALBUMIN/GLOB SERPL: 1.3 {RATIO} (ref 1–2)
ALP LIVER SERPL-CCNC: 87 U/L
ALT SERPL-CCNC: 39 U/L
ANION GAP SERPL CALC-SCNC: 7 MMOL/L (ref 0–18)
AST SERPL-CCNC: 36 U/L (ref ?–34)
BILIRUB SERPL-MCNC: 0.4 MG/DL (ref 0.2–1.1)
BUN BLD-MCNC: 22 MG/DL (ref 9–23)
CALCIUM BLD-MCNC: 10.7 MG/DL (ref 8.7–10.4)
CHLORIDE SERPL-SCNC: 105 MMOL/L (ref 98–112)
CO2 SERPL-SCNC: 27 MMOL/L (ref 21–32)
CREAT BLD-MCNC: 1.19 MG/DL
EGFRCR SERPLBLD CKD-EPI 2021: 51 ML/MIN/1.73M2 (ref 60–?)
FASTING STATUS PATIENT QL REPORTED: YES
GLOBULIN PLAS-MCNC: 3.6 G/DL (ref 2–3.5)
GLUCOSE BLD-MCNC: 123 MG/DL (ref 70–99)
OSMOLALITY SERPL CALC.SUM OF ELEC: 293 MOSM/KG (ref 275–295)
POTASSIUM SERPL-SCNC: 3.4 MMOL/L (ref 3.5–5.1)
PROT SERPL-MCNC: 8.3 G/DL (ref 5.7–8.2)
SODIUM SERPL-SCNC: 139 MMOL/L (ref 136–145)
T4 FREE SERPL-MCNC: 1.5 NG/DL (ref 0.8–1.7)
TSI SER-ACNC: 0.76 UIU/ML (ref 0.55–4.78)
VIT D+METAB SERPL-MCNC: 98.9 NG/ML (ref 30–100)

## 2024-12-17 PROCEDURE — 82306 VITAMIN D 25 HYDROXY: CPT

## 2024-12-17 PROCEDURE — 84439 ASSAY OF FREE THYROXINE: CPT

## 2024-12-17 PROCEDURE — 80053 COMPREHEN METABOLIC PANEL: CPT

## 2024-12-17 PROCEDURE — 82340 ASSAY OF CALCIUM IN URINE: CPT

## 2024-12-17 PROCEDURE — 84443 ASSAY THYROID STIM HORMONE: CPT

## 2024-12-17 PROCEDURE — 36415 COLL VENOUS BLD VENIPUNCTURE: CPT

## 2024-12-17 RX ORDER — POTASSIUM CHLORIDE 1500 MG/1
20 TABLET, EXTENDED RELEASE ORAL DAILY
Qty: 30 TABLET | Refills: 0 | Status: SHIPPED | OUTPATIENT
Start: 2024-12-17 | End: 2025-01-16

## 2024-12-18 ENCOUNTER — LAB ENCOUNTER (OUTPATIENT)
Dept: LAB | Facility: HOSPITAL | Age: 64
End: 2024-12-18
Attending: NURSE PRACTITIONER
Payer: MEDICARE

## 2024-12-18 DIAGNOSIS — E83.52 SERUM CALCIUM ELEVATED: ICD-10-CM

## 2024-12-19 ENCOUNTER — OFFICE VISIT (OUTPATIENT)
Dept: FAMILY MEDICINE CLINIC | Facility: CLINIC | Age: 64
End: 2024-12-19
Payer: MEDICARE

## 2024-12-19 VITALS
WEIGHT: 157 LBS | SYSTOLIC BLOOD PRESSURE: 132 MMHG | TEMPERATURE: 97 F | HEIGHT: 62 IN | RESPIRATION RATE: 16 BRPM | DIASTOLIC BLOOD PRESSURE: 78 MMHG | BODY MASS INDEX: 28.89 KG/M2 | HEART RATE: 82 BPM

## 2024-12-19 DIAGNOSIS — E78.5 HYPERLIPIDEMIA, UNSPECIFIED HYPERLIPIDEMIA TYPE: Primary | ICD-10-CM

## 2024-12-19 LAB
CALCIUM 24H UR-SRATE: 154 MG/24HR (ref 100–300)
SPECIMEN VOL UR: 2200 ML

## 2024-12-19 PROCEDURE — 99214 OFFICE O/P EST MOD 30 MIN: CPT | Performed by: FAMILY MEDICINE

## 2024-12-19 PROCEDURE — G2211 COMPLEX E/M VISIT ADD ON: HCPCS | Performed by: FAMILY MEDICINE

## 2024-12-19 RX ORDER — ATORVASTATIN CALCIUM 40 MG/1
40 TABLET, FILM COATED ORAL NIGHTLY
Qty: 90 TABLET | Refills: 0 | Status: SHIPPED | OUTPATIENT
Start: 2024-12-19

## 2024-12-19 NOTE — PROGRESS NOTES
[facility] Office Note  Chief Complaint:   Chief Complaint   Patient presents with    Lab Results     Labs completed on 2024       HPI:   This is a 64 year old female coming in for lab review as well as review of cardiac scan.  The patient states that she has been continuing to follow-up with gynecology she does have history of monoclonal gammopathy hypothyroid hypercholesterolemia hypertension.  States that she has been taking her medications as prescribed she is doing a 24-hour urine screening for them at this point.      Past Medical History:    Arthritis    Decorative tattoo    Hx estrogen therapy    HRT    Hx of colposcopy with cervical biopsy    HYPERTHYROIDISM    Kidney stone    Thyroid disease    Unspecified essential hypertension     Past Surgical History:   Procedure Laterality Date    Appendectomy  2011    Laproscopic    Colonoscopy  10/21/2010    Repeat 5 years, No polyps     Dexa, axial site (spine, hips, pelvis)  2010    [id number], screening  10/2010    Pap smear  2010    Upper gi endoscopy performed       Social History:  Social History     Socioeconomic History    Marital status:     Number of children: 1   Tobacco Use    Smoking status: Former     Current packs/day: 0.00     Average packs/day: 1 pack/day for 28.0 years (28.0 ttl pk-yrs)     Types: Cigarettes     Start date: 1972     Quit date: 2000     Years since quittin.3    Smokeless tobacco: Never    Tobacco comments:     Quit   Vaping Use    Vaping status: Never Used   Substance and Sexual Activity    Alcohol use: No    Drug use: Yes     Types: Cannabis     Comment: THC gummies for sleep occ   Other Topics Concern    Caffeine Concern No     Comment: drinks Coke Zero    Exercise No     Comment: 6000 steps a day walking     Family History:  Family History   Problem Relation Age of Onset    Hypertension Mother     Heart Attack Mother         AMI    Other (COPD) Mother      Other (MS) Mother     Cancer Maternal Grandmother         Colon    Stroke Sister      Allergies:  Allergies[1]  Current Meds:  Current Outpatient Medications   Medication Sig Dispense Refill    atorvastatin 40 MG Oral Tab Take 1 tablet (40 mg total) by mouth nightly. 90 tablet 0    Potassium Chloride ER 20 MEQ Oral Tab CR Take 20 mEq by mouth daily. 30 tablet 0    levothyroxine 100 MCG Oral Tab Take 1 tablet (100 mcg total) by mouth daily.      omega-3-acid ethyl esters 1 g Oral Cap Take 4 capsules (4 g total) by mouth daily. 360 capsule 0    amLODIPine 10 MG Oral Tab Take 1 tablet (10 mg total) by mouth daily. 90 tablet 1    Valsartan-hydroCHLOROthiazide 160-25 MG Oral Tab Take 1 tablet by mouth daily. 90 tablet 1    amitriptyline 25 MG Oral Tab Take 1 tablet (25 mg total) by mouth nightly. 90 tablet 3    Multiple Vitamin (MULTIVITAMIN OR) Take  by mouth.      Cholecalciferol (VITAMIN D) 2000 UNIT Oral Tab Take by mouth. One 5,000 unit, one 2,000 unit daily         Counseling given: Not Answered  Tobacco comments: Quit       REVIEW OF SYSTEMS:   Consitutional: No fevers, chills, sweats  Eye: No recent visual problems  ENMT: No ear pain nasal congestion sore throat  Respiratory: No shortness of breath, cough  Cardiovascular: No chest pain palpitations syncope  Gastrointestinal: No nausea vomiting diarrhea  Genitourinary: No hematuria  Hema/Lymph no bruising tendency, swollen lymph glands      Medical, surgical, family, and social histories were reviewed      EXAM:   VITALS:   Vitals:    12/19/24 0707   BP: 132/78   Pulse: 82   Resp: 16   Temp: 97.2 °F (36.2 °C)      GENERAL: well developed, well nourished, in no apparent distress  SKIN: no rashes, no suspicious lesions: Cool and Dry  HEENT: atraumatic, normocephalic, ears and throat are clear    Ears: TM's clear and visible bilaterally, no excess cerumen or erythema.   EYES: Pupils equal round and reactive.  Extraocular motions intact no scleral icterus no injection  or drainage  THROAT without erythema tonsillar hypertrophy or exudate.  Uvula midline airway patent  NECK: Given midline.  No JVD or lymphadenopathy supple nontender no meningeal signs   LUNGS: clear to auscultation sounds equal bilaterally no wheezes rales or rhonchi  CARDIO: Regular rate and rhythm without murmurs gallops or rubs    ASSESSMENT AND PLAN:   1. Hyperlipidemia, unspecified hyperlipidemia type  - atorvastatin 40 MG Oral Tab; Take 1 tablet (40 mg total) by mouth nightly.  Dispense: 90 tablet; Refill: 0  - Comp Metabolic Panel (14); Future  - Lipid Panel; Future  I did discuss with the patient at this time her LDL is at 108 her calcium score was elevated at this point I did discuss would like her to increase her atorvastatin to 40 mg once a day she was asked to take 4 of her 10 mg pills at home.  She was asked to watch her diet decrease fatty food fried food intake we did also discuss that her blood sugar was elevated she will be completing a hemoglobin A1c on Monday her potassium was slightly low I did discuss this is likely the hydrochlorothiazide she was asked to increase potassium foods such as bananas every day.  She was asked to update her blood work in the next 3 months CMP as well as lipid panel.    Meds & Refills for this Visit:  Requested Prescriptions     Signed Prescriptions Disp Refills    atorvastatin 40 MG Oral Tab 90 tablet 0     Sig: Take 1 tablet (40 mg total) by mouth nightly.       Health Maintenance:  Health Maintenance Due   Topic Date Due    COVID-19 Vaccine (4 - 2024-25 season) 09/01/2024       Patient/Caregiver Education: Patient/Caregiver Education: There are no barriers to learning. Medical education done.   Outcome: Patient verbalizes understanding. Patient is notified to call with any questions, complications, allergies, or worsening or changing symptoms.  Patient is to call with any side effects or complications from the treatments as a result of today.     Problem  List:  Patient Active Problem List   Diagnosis    Symptomatic menopausal or female climacteric states    Allergy, unspecified not elsewhere classified    HPV (human papilloma virus) infection    Grief reaction    Benign essential hypertension    Hypothyroidism    Migraine    Multinodular goiter    Monoclonal gammopathy    Osteopenia    Hyperlipidemia    Glucose intolerance    History of cervical dysplasia    Family history of colon cancer    Special screening for malignant neoplasm of colon    Dysphagia, unspecified         No follow-ups on file.     Nahun Mcdonald MD    Please note that portions of this note may have been completed with a voice recognition program. Efforts were made to edit the dictations but occasionally words are mis-transcribed.       [1]   Allergies  Allergen Reactions    Milk Thistle      Powd     Peanuts      Derived Products

## 2024-12-23 DIAGNOSIS — E83.52 HYPERCALCEMIA: Primary | ICD-10-CM

## 2024-12-27 ENCOUNTER — LAB ENCOUNTER (OUTPATIENT)
Dept: LAB | Facility: HOSPITAL | Age: 64
End: 2024-12-27
Attending: NURSE PRACTITIONER
Payer: MEDICARE

## 2024-12-27 DIAGNOSIS — E87.6 HYPOKALEMIA: ICD-10-CM

## 2024-12-27 DIAGNOSIS — R73.9 HYPERGLYCEMIA: ICD-10-CM

## 2024-12-27 LAB
EST. AVERAGE GLUCOSE BLD GHB EST-MCNC: 126 MG/DL (ref 68–126)
HBA1C MFR BLD: 6 % (ref ?–5.7)
POTASSIUM SERPL-SCNC: 3.8 MMOL/L (ref 3.5–5.1)

## 2024-12-27 PROCEDURE — 83036 HEMOGLOBIN GLYCOSYLATED A1C: CPT

## 2024-12-27 PROCEDURE — 84132 ASSAY OF SERUM POTASSIUM: CPT

## 2024-12-27 PROCEDURE — 36415 COLL VENOUS BLD VENIPUNCTURE: CPT

## 2025-01-02 DIAGNOSIS — G43.001 MIGRAINE WITHOUT AURA AND WITH STATUS MIGRAINOSUS, NOT INTRACTABLE: Primary | ICD-10-CM

## 2025-01-03 NOTE — TELEPHONE ENCOUNTER
Medication: BUTALBITAL-ACETAMINOPHEN-CAFFEINE -40 MG      Date of last refill: 2/6/23 (#30/5R)  [place]     Last office visit: 5/8/24  Due back to clinic per last office note:  1 yr  Date next office visit scheduled:    Future Appointments   Date Time Provider Department Center   3/17/2025  7:00 AM  [facility]           Last OV note recommendation:    Problem/s Identified this visit:   1. Migraine without aura and with status migrainosus, not intractable             Discussion plus Diagnostics & Treatment Orders:     Migraines- controlled. Continue the Amitriptyline 25mg nightly. Can use the Fioricet for abortive tx prn. If headaches continue to be well controlled at next visit then acacia consider tapering down the Amitriptyline to 10mg nightly. She expressed full understanding.

## 2025-01-06 RX ORDER — BUTALBITAL, ACETAMINOPHEN AND CAFFEINE 50; 325; 40 MG/1; MG/1; MG/1
1-2 TABLET ORAL EVERY 6 HOURS PRN
Qty: 30 TABLET | Refills: 0 | Status: SHIPPED | OUTPATIENT
Start: 2025-01-06

## 2025-01-10 RX ORDER — POTASSIUM CHLORIDE 1500 MG/1
20 TABLET, EXTENDED RELEASE ORAL DAILY
Qty: 90 TABLET | Refills: 0 | Status: SHIPPED | OUTPATIENT
Start: 2025-01-10 | End: 2025-02-09

## 2025-01-10 NOTE — TELEPHONE ENCOUNTER
Potassium Chloride ER 20 MEQ Oral Tab CR     Please see pended medications.    Please sign if appropriate.      Thank you      Last OV: 12/19/2024      Last refill: 12/17/2024 for 30 tabs      Last las was completed on 12/27/2024 with her Potassium level being 3.8.

## 2025-01-24 NOTE — TELEPHONE ENCOUNTER
Chart reviewed.     Patient was informed to increase levothyroxine to 100mcg daily on 11/5/24. See TSH and Free T4 results.     Patient did not need a refill at that time, but now she does.     LOV: 12/19/24 with Dr Mcdonald.   NOV: None scheduled

## 2025-01-24 NOTE — TELEPHONE ENCOUNTER
Patient called in stating her pharmacy does not have prescription below. Only one they have is for 88mg. Pharmacy is asking for a new rx to get sent over.     Prescription    levothyroxine 100 MCG Oral Tab     Needs to be sent to   [facility] [id number] - [place], [place] - [address] [facility] [phone], [phone] [address] [facility] [id number]   Phone: [phone] Fax: [phone]       Patient only have 5 days left.

## 2025-01-27 RX ORDER — LEVOTHYROXINE SODIUM 100 UG/1
100 TABLET ORAL DAILY
Qty: 90 TABLET | Refills: 0 | Status: SHIPPED | OUTPATIENT
Start: 2025-01-27

## 2025-03-15 DIAGNOSIS — E78.5 HYPERLIPIDEMIA, UNSPECIFIED HYPERLIPIDEMIA TYPE: ICD-10-CM

## 2025-03-15 RX ORDER — ATORVASTATIN CALCIUM 40 MG/1
40 TABLET, FILM COATED ORAL NIGHTLY
Qty: 30 TABLET | Refills: 0 | Status: SHIPPED | OUTPATIENT
Start: 2025-03-15

## 2025-03-15 NOTE — TELEPHONE ENCOUNTER
LOV: 12/19/2024  for: Lab Results   Patient advised to RTC on:    NOV:04/14/2025    Medication Quantity Refills Start End   atorvastatin 40 MG Oral Tab 90 tablet 0 12/19/2024 --   Sig:   Take 1 tablet (40 mg total) by mouth nightly.

## 2025-03-17 ENCOUNTER — LAB ENCOUNTER (OUTPATIENT)
Dept: LAB | Facility: HOSPITAL | Age: 65
End: 2025-03-17
Attending: NURSE PRACTITIONER
Payer: MEDICARE

## 2025-03-17 DIAGNOSIS — E83.52 SERUM CALCIUM ELEVATED: Primary | ICD-10-CM

## 2025-03-17 DIAGNOSIS — E78.5 HYPERLIPIDEMIA, UNSPECIFIED HYPERLIPIDEMIA TYPE: ICD-10-CM

## 2025-03-17 DIAGNOSIS — E55.9 VITAMIN D DEFICIENCY: ICD-10-CM

## 2025-03-17 DIAGNOSIS — Z86.39 HISTORY OF VITAMIN D DEFICIENCY: ICD-10-CM

## 2025-03-17 LAB
ALBUMIN SERPL-MCNC: 5.1 G/DL (ref 3.2–4.8)
ALBUMIN/GLOB SERPL: 1.5 {RATIO} (ref 1–2)
ALP LIVER SERPL-CCNC: 88 U/L
ALT SERPL-CCNC: 35 U/L
ANION GAP SERPL CALC-SCNC: 9 MMOL/L (ref 0–18)
AST SERPL-CCNC: 30 U/L (ref ?–34)
BILIRUB SERPL-MCNC: 0.5 MG/DL (ref 0.2–1.1)
BUN BLD-MCNC: 24 MG/DL (ref 9–23)
CALCIUM BLD-MCNC: 11.3 MG/DL (ref 8.7–10.6)
CHLORIDE SERPL-SCNC: 102 MMOL/L (ref 98–112)
CHOLEST SERPL-MCNC: 134 MG/DL (ref ?–200)
CO2 SERPL-SCNC: 29 MMOL/L (ref 21–32)
CREAT BLD-MCNC: 1.27 MG/DL
EGFRCR SERPLBLD CKD-EPI 2021: 47 ML/MIN/1.73M2 (ref 60–?)
GLOBULIN PLAS-MCNC: 3.5 G/DL (ref 2–3.5)
GLUCOSE BLD-MCNC: 127 MG/DL (ref 70–99)
HDLC SERPL-MCNC: 41 MG/DL (ref 40–59)
LDLC SERPL CALC-MCNC: 67 MG/DL (ref ?–100)
NONHDLC SERPL-MCNC: 93 MG/DL (ref ?–130)
OSMOLALITY SERPL CALC.SUM OF ELEC: 296 MOSM/KG (ref 275–295)
POTASSIUM SERPL-SCNC: 3.6 MMOL/L (ref 3.5–5.1)
PROT SERPL-MCNC: 8.6 G/DL (ref 5.7–8.2)
SODIUM SERPL-SCNC: 140 MMOL/L (ref 136–145)
TRIGL SERPL-MCNC: 152 MG/DL (ref 30–149)
VIT D+METAB SERPL-MCNC: 99.8 NG/ML (ref 30–100)
VLDLC SERPL CALC-MCNC: 23 MG/DL (ref 0–30)

## 2025-03-17 PROCEDURE — 80053 COMPREHEN METABOLIC PANEL: CPT

## 2025-03-17 PROCEDURE — 80061 LIPID PANEL: CPT

## 2025-03-17 PROCEDURE — 36415 COLL VENOUS BLD VENIPUNCTURE: CPT

## 2025-03-17 PROCEDURE — 82306 VITAMIN D 25 HYDROXY: CPT

## 2025-03-21 ENCOUNTER — LAB ENCOUNTER (OUTPATIENT)
Dept: LAB | Facility: HOSPITAL | Age: 65
End: 2025-03-21
Attending: STUDENT IN AN ORGANIZED HEALTH CARE EDUCATION/TRAINING PROGRAM
Payer: MEDICARE

## 2025-03-21 DIAGNOSIS — E83.52 SERUM CALCIUM ELEVATED: ICD-10-CM

## 2025-03-21 LAB
ALBUMIN SERPL-MCNC: 4.9 G/DL (ref 3.2–4.8)
ALBUMIN/GLOB SERPL: 1.4 {RATIO} (ref 1–2)
ALP LIVER SERPL-CCNC: 86 U/L
ALT SERPL-CCNC: 31 U/L
ANION GAP SERPL CALC-SCNC: 6 MMOL/L (ref 0–18)
AST SERPL-CCNC: 30 U/L (ref ?–34)
BILIRUB SERPL-MCNC: 0.3 MG/DL (ref 0.2–1.1)
BUN BLD-MCNC: 29 MG/DL (ref 9–23)
CALCIUM BLD-MCNC: 10.6 MG/DL (ref 8.7–10.6)
CHLORIDE SERPL-SCNC: 101 MMOL/L (ref 98–112)
CO2 SERPL-SCNC: 32 MMOL/L (ref 21–32)
CREAT BLD-MCNC: 1.25 MG/DL
EGFRCR SERPLBLD CKD-EPI 2021: 48 ML/MIN/1.73M2 (ref 60–?)
FASTING STATUS PATIENT QL REPORTED: YES
GLOBULIN PLAS-MCNC: 3.5 G/DL (ref 2–3.5)
GLUCOSE BLD-MCNC: 123 MG/DL (ref 70–99)
OSMOLALITY SERPL CALC.SUM OF ELEC: 295 MOSM/KG (ref 275–295)
POTASSIUM SERPL-SCNC: 3.8 MMOL/L (ref 3.5–5.1)
PROT SERPL-MCNC: 8.4 G/DL (ref 5.7–8.2)
SODIUM SERPL-SCNC: 139 MMOL/L (ref 136–145)

## 2025-03-21 PROCEDURE — 36415 COLL VENOUS BLD VENIPUNCTURE: CPT

## 2025-03-21 PROCEDURE — 80053 COMPREHEN METABOLIC PANEL: CPT

## 2025-04-02 RX ORDER — OMEGA-3-ACID ETHYL ESTERS 1 G/1
4 CAPSULE, LIQUID FILLED ORAL DAILY
Qty: 360 CAPSULE | Refills: 0 | Status: SHIPPED | OUTPATIENT
Start: 2025-04-02

## 2025-04-10 DIAGNOSIS — E78.5 HYPERLIPIDEMIA, UNSPECIFIED HYPERLIPIDEMIA TYPE: ICD-10-CM

## 2025-04-11 RX ORDER — ATORVASTATIN CALCIUM 40 MG/1
40 TABLET, FILM COATED ORAL NIGHTLY
Qty: 90 TABLET | Refills: 0 | Status: SHIPPED | OUTPATIENT
Start: 2025-04-11

## 2025-04-14 ENCOUNTER — TELEPHONE (OUTPATIENT)
Dept: FAMILY MEDICINE CLINIC | Facility: CLINIC | Age: 65
End: 2025-04-14

## 2025-04-14 ENCOUNTER — OFFICE VISIT (OUTPATIENT)
Dept: FAMILY MEDICINE CLINIC | Facility: CLINIC | Age: 65
End: 2025-04-14
Payer: MEDICARE

## 2025-04-14 VITALS
BODY MASS INDEX: 28.52 KG/M2 | DIASTOLIC BLOOD PRESSURE: 68 MMHG | HEIGHT: 62 IN | RESPIRATION RATE: 18 BRPM | TEMPERATURE: 97 F | SYSTOLIC BLOOD PRESSURE: 112 MMHG | HEART RATE: 70 BPM | WEIGHT: 155 LBS

## 2025-04-14 DIAGNOSIS — Z00.00 HEALTHCARE MAINTENANCE: ICD-10-CM

## 2025-04-14 DIAGNOSIS — D47.2 MONOCLONAL GAMMOPATHY: ICD-10-CM

## 2025-04-14 DIAGNOSIS — I10 BENIGN ESSENTIAL HYPERTENSION: ICD-10-CM

## 2025-04-14 DIAGNOSIS — G43.009 MIGRAINE WITHOUT AURA AND WITHOUT STATUS MIGRAINOSUS, NOT INTRACTABLE: ICD-10-CM

## 2025-04-14 DIAGNOSIS — Z00.00 MEDICARE ANNUAL WELLNESS VISIT, SUBSEQUENT: ICD-10-CM

## 2025-04-14 DIAGNOSIS — E03.9 HYPOTHYROIDISM, UNSPECIFIED TYPE: ICD-10-CM

## 2025-04-14 DIAGNOSIS — Z12.31 ENCOUNTER FOR SCREENING MAMMOGRAM FOR MALIGNANT NEOPLASM OF BREAST: ICD-10-CM

## 2025-04-14 DIAGNOSIS — E78.5 HYPERLIPIDEMIA, UNSPECIFIED HYPERLIPIDEMIA TYPE: ICD-10-CM

## 2025-04-14 RX ORDER — FAMOTIDINE 40 MG/1
40 TABLET, FILM COATED ORAL DAILY
Qty: 90 TABLET | Refills: 0 | Status: SHIPPED | OUTPATIENT
Start: 2025-04-14 | End: 2025-04-14

## 2025-04-14 RX ORDER — OMEPRAZOLE 20 MG/1
20 CAPSULE, DELAYED RELEASE ORAL
Qty: 90 CAPSULE | Refills: 0 | Status: SHIPPED | OUTPATIENT
Start: 2025-04-14

## 2025-04-14 RX ORDER — FAMOTIDINE 20 MG/1
20 TABLET, FILM COATED ORAL 2 TIMES DAILY
COMMUNITY
Start: 2025-02-06 | End: 2025-04-14 | Stop reason: ALTCHOICE

## 2025-04-14 NOTE — PROGRESS NOTES
Subjective:   Che Cabrera is a 65 year old female who presents for a Medicare Subsequent Annual Wellness visit (Pt already had Initial Annual Wellness) and scheduled follow up of multiple significant but stable problems.   History of Present Illness            Patient has a past medical history of hypothyroidism hypertension hyperlipidemia migraines monoclonal gammopathy     Last colonoscopy 2021  Dr. Urias - hematology for MGUS  Dr. Houston - hepatology  Sandra Harman - neurology            History/Other:   Fall Risk Assessment:   She has been screened for Falls and is low risk.      Cognitive Assessment:   She had a completely normal cognitive assessment - see flowsheet entries     Functional Ability/Status:   Che Cabrera has some abnormal functions as listed below:  She has Hearing problems based on screening of functional status.      Depression Screening (PHQ):  PHQ-2 SCORE: 0  , done 4/14/2025          Advanced Directives:   She does NOT have a Living Will. [Do you have a living will?: No]  She does have a POA but we do NOT have it on file in [employer].    Patient has Advance Care Planning documents present in EMR. Reviewed documents with patient (and family/surrogate if present).      Problem List[1]  Allergies:  She is allergic to milk thistle and peanuts.    Current Medications:  Active Meds, Sig Only[2]    Medical History:  She  has a past medical history of Arthritis, Decorative tattoo (1980), estrogen therapy (2008), colposcopy with cervical biopsy, HYPERTHYROIDISM, Kidney stone, Thyroid disease (Not sure), and Unspecified essential hypertension.  Surgical History:  She  has a past surgical history that includes appendectomy (11/20/2011); dexa, axial site (spine, hips, pelvis) (07/26/2010); colonoscopy (10/21/2010); [id number], screening (10/2010); pap smear (09/30/2010); and upper gi endoscopy performed.   Family History:  Her family history includes COPD in her mother; Cancer in her maternal  grandmother; Heart Attack in her mother; Hypertension in her mother; MS in her mother; Stroke in her sister.  Social History:  She  reports that she quit smoking about 24 years ago. Her smoking use included cigarettes. She started smoking about 52 years ago. She has a 28 pack-year smoking history. She has never used smokeless tobacco. She reports current drug use. Drug: Cannabis. She reports that she does not drink alcohol.    Tobacco:  She smoked tobacco in the past but quit greater than 12 months ago.  Tobacco Use[3]     CAGE Alcohol Screen:   CAGE screening score of 0 on 4/14/2025, showing low risk of alcohol abuse.      Patient Care Team:  Nahun Mcdonald MD as PCP - General  Sandra Harman PA as Consulting Physician (Physician Assistant)  Dressler, Stephanie, PA-C (Physician Assistant)  Belinda Villagomez APRN (Nurse Practitioner)    Review of Systems  Consitutional: No fevers, chills, sweats  Eye: No recent visual problems  ENMT: No ear pain nasal congestion sore throat  Respiratory: No shortness of breath, cough  Cardiovascular: No chest pain palpitations syncope  Gastrointestinal: No nausea vomiting diarrhea  Genitourinary: No hematuria  Hema/Lymph no bruising tendency, swollen lymph glands  Endocrine: Negative for excessive thirst excessive hunger  Musculoskeletal: No back pain neck pain joint pain muscle pain decreased range of motion  Integumentary: No rash, pruritus, abrasions  Neurologic: Alert and oriented x4  Psychiatric: No anxiety, depression    Medical, surgical, family, and social histories were reviewed      Objective:   Physical Exam  VITALS: Vitals reviewed   GENERAL: well developed, well nourished, in no apparent distress  SKIN: no rashes, no suspicious lesions: Cool and Dry  HEENT: atraumatic, normocephalic, ears and throat are clear bilateral tympanic membranes lucent nonbulging intact nose without bleeding purulent discharge or septal hematoma.    EYES: Pupils equal round and reactive.   Extraocular motions intact no scleral icterus no injection or drainage  THROAT without erythema tonsillar hypertrophy or exudate.  Uvula midline airway patent                Hearing Assessed via: Whispered Voice  NECK: trachea midline.  No JVD or lymphadenopathy supple nontender no meningeal signs   LUNGS: clear to auscultation sounds equal bilaterally no wheezes rales or rhonchi  CARDIO: Regular rate and rhythm without murmurs gallops or rubs  GI: Soft nontender nondistended no hepatomegaly palpable masses.  No guarding.  BACK non tender without deformity or crepitus no flank tenderness  EXTREMITIES: no cyanosis, clubbing or edema no joint tenderness effusion or edema noted.  No calf tenderness negative Homans' sign bilaterally  NEURO: Awake and alert.  Cranial nerves II through XII intact motor and sensory grossly within normal limits.  5 out of 5 muscle strength in all muscle groups.  Normal speech.  PSYCHIATRIC: Awake, alert and oriented x3, cooperative appropriate mood and affect.  Judgment intact      /68 (BP Location: Left arm, Patient Position: Sitting, Cuff Size: adult)   Pulse 70   Temp 97.4 °F (36.3 °C) (Temporal)   Resp 18   Ht 5' 2\" (1.575 m)   Wt 155 lb (70.3 kg)   BMI 28.35 kg/m²  Estimated body mass index is 28.35 kg/m² as calculated from the following:    Height as of this encounter: 5' 2\" (1.575 m).    Weight as of this encounter: 155 lb (70.3 kg).    Medicare Hearing Assessment:   Hearing Screening    Time taken: 4/14/2025  7:18 AM  Entry User: Marco A Philippe MA  Screening Method: Finger Rub  Finger Rub Result: Pass             Assessment & Plan:   Che Cabrera is a 65 year old female who presents for a Medicare Assessment.     1. Medicare annual wellness visit, subsequent    I did discuss with the patient I would suggest her to update her mammogram at the end of this month.  She is up-to-date with all other preventative measures she was asked to follow-up yearly for regular  physical exams or for any other acute concern.        2. Hypothyroidism, unspecified type  -     Comp Metabolic Panel (14); Future; Expected date: 10/14/2025  -     Lipid Panel; Future; Expected date: 10/14/2025  -     CBC With Differential With Platelet; Future; Expected date: 10/14/2025  -     Triiodothyronine (T3) Total; Future; Expected date: 10/14/2025  -     TSH and Free T4; Future; Expected date: 10/14/2025      Did discuss with the patient I would continue with the current Synthroid dose.  She was asked to update her blood work in the next 6 months      3. Benign essential hypertension    Blood pressure has been well-controlled she is currently on amlodipine losartan hydrochlorothiazide she was asked to continue the medication as prescribed      4. Hyperlipidemia, unspecified hyperlipidemia type    Patient's LDL has been well-controlled she is currently on atorvastatin she was asked to continue to watch her fatty food fried food intake she was asked to follow-up in the next 6 months to repeat her blood work      5. Migraine without aura and without status migrainosus, not intractable    Patient is currently on Fioricet she was asked to continue with the medication her headaches have been increasing.      6. Monoclonal gammopathy      Patient does follow-up with oncology she was asked to continue to do so she will be having blood work completed in 6 months      7. Encounter for screening mammogram for malignant neoplasm of breast  -     [facility] GEMINI 2D+3D SCREENING BILAT (CPT=77067/76141); Future; Expected date: 04/14/2025  -     Comp Metabolic Panel (14); Future; Expected date: 10/14/2025  -     Lipid Panel; Future; Expected date: 10/14/2025  -     CBC With Differential With Platelet; Future; Expected date: 10/14/2025  -     Triiodothyronine (T3) Total; Future; Expected date: 10/14/2025  -     TSH and Free T4; Future; Expected date: 10/14/2025  8. Healthcare maintenance  -     Comp Metabolic Panel (14); Future;  Expected date: 10/14/2025  -     Lipid Panel; Future; Expected date: 10/14/2025  -     CBC With Differential With Platelet; Future; Expected date: 10/14/2025  -     Triiodothyronine (T3) Total; Future; Expected date: 10/14/2025  -     TSH and Free T4; Future; Expected date: 10/14/2025  Other orders  -     Famotidine; Take 1 tablet (40 mg total) by mouth daily.  Dispense: 90 tablet; Refill: 0  [unfilled]            The patient presented today for Medicare annual wellness visit.  During the course of today's visit the health risk assessment past medical family and social histories were updated and reviewed with the patient.  The patient was educated and counseled about appropriate screening and preventative services and these were ordered as appropriate.  Referrals for educational and counseling services were made where indicated.  Advance directives were discussed.  A copy of the recommended preventative screenings as well as discharge instructions were provided to the patient.  End-of-life planning was discussed advanced directives were also discussed and are in place.            The patient indicates understanding of these issues and agrees to the plan.  Imaging studies ordered.  Lab work ordered.  Patient reassured.  Prescription medication ordered.  Reinforced healthy diet, lifestyle, and exercise.      No follow-ups on file.     Nahun Mcdonald MD, 4/14/2025     Supplementary Documentation:   General Health:  In the past six months, have you lost more than 10 pounds without trying?: 2 - No  Has your appetite been poor?: No  Type of Diet: Balanced  How does the patient maintain a good energy level?: Daily Walks  How would you describe your daily physical activity?: Moderate  How would you describe your current health state?: Good  How do you maintain positive mental well-being?: Social Interaction, Visiting Family, Puzzles, Games, Visiting Friends  On a scale of 0 to 10, with 0 being no pain and 10 being  severe pain, what is your pain level?: 0 - (None)  In the past six months, have you experienced urine leakage?: 0-No  At any time do you feel concerned for the safety/well-being of yourself and/or your children, in your home or elsewhere?: No  Have you had any immunizations at another office such as Influenza, Hepatitis B, Tetanus, or Pneumococcal?: No    Health Maintenance   Topic Date Due    COVID-19 Vaccine (4 - 2024-25 season) 09/01/2024    Annual Physical  04/11/2025    Pap Smear  04/11/2025    Mammogram  04/25/2025    Colorectal Cancer Screening  02/25/2028    Influenza Vaccine  Completed    DEXA Scan  Completed    Annual Depression Screening  Completed    Fall Risk Screening (Annual)  Completed    Pneumococcal Vaccine: 50+ Years  Completed    Zoster Vaccines  Completed    Meningococcal B Vaccine  Aged Out            [1]   Patient Active Problem List  Diagnosis    Symptomatic menopausal or female climacteric states    Allergy, unspecified not elsewhere classified    HPV (human papilloma virus) infection    Grief reaction    Benign essential hypertension    Hypothyroidism    Migraine    Multinodular goiter    Monoclonal gammopathy    Osteopenia    Hyperlipidemia    Glucose intolerance    History of cervical dysplasia    Family history of colon cancer    Special screening for malignant neoplasm of colon    Dysphagia, unspecified   [2]   Outpatient Medications Marked as Taking for the 4/14/25 encounter (Office Visit) with Nahun Mcdonald MD   Medication Sig    FLUoxetine 20 MG Oral Cap Take 1 capsule (20 mg total) by mouth daily.    famotidine 40 MG Oral Tab Take 1 tablet (40 mg total) by mouth daily.    atorvastatin 40 MG Oral Tab Take 1 tablet (40 mg total) by mouth nightly.    omega-3-acid ethyl esters 1 g Oral Cap Take 4 capsules (4 g total) by mouth daily.    levothyroxine 100 MCG Oral Tab Take 1 tablet (100 mcg total) by mouth daily.    butalbital-acetaminophen-caffeine -40 MG Oral Tab TAKE 1- 2  TABLETS BY MOUTH EVERY 6 HOURS AS NEEDED FOR HEADACHE, LIMIT USE TO 3 TIMES A WEEK    amLODIPine 10 MG Oral Tab Take 1 tablet (10 mg total) by mouth daily.    Valsartan-hydroCHLOROthiazide 160-25 MG Oral Tab Take 1 tablet by mouth daily.    amitriptyline 25 MG Oral Tab Take 1 tablet (25 mg total) by mouth nightly.    Multiple Vitamin (MULTIVITAMIN OR) Take  by mouth.    Cholecalciferol (VITAMIN D) 2000 UNIT Oral Tab Take by mouth. One 5,000 unit, one 2,000 unit daily    [3]   Social History  Tobacco Use   Smoking Status Former    Current packs/day: 0.00    Average packs/day: 1 pack/day for 28.0 years (28.0 ttl pk-yrs)    Types: Cigarettes    Start date: 1972    Quit date: 2000    Years since quittin.6   Smokeless Tobacco Never   Tobacco Comments    Quit

## 2025-04-14 NOTE — TELEPHONE ENCOUNTER
Patient was seen today. Patient called because she wants famotidine 40 MG Oral Tab to be taken off from her medication lists. Patient said she does not need it. Instead, patient said she needs Omeprazole DR 20 mg for her heartburn.    Please send to :   [id number] [place], [place] - [address] [facility] [place] [phone], [phone]     Please advise.

## 2025-04-24 RX ORDER — LEVOTHYROXINE SODIUM 100 UG/1
100 TABLET ORAL DAILY
Qty: 90 TABLET | Refills: 0 | Status: SHIPPED | OUTPATIENT
Start: 2025-04-24

## 2025-05-02 DIAGNOSIS — G43.001 MIGRAINE WITHOUT AURA AND WITH STATUS MIGRAINOSUS, NOT INTRACTABLE: ICD-10-CM

## 2025-05-02 NOTE — TELEPHONE ENCOUNTER
Medication: AMITRIPTYLINE 25 MG      Date of last refill: 5/8/24 (#90/3R)  [place]A     Last office visit: 5/8/24  Due back to clinic per last office note:  1 yr  Date next office visit scheduled:    Future Appointments   Date Time Provider Department Center   5/12/2025  7:20 AM [facility] [address]           Last OV note recommendation:    Problem/s Identified this visit:   1. Migraine without aura and with status migrainosus, not intractable             Discussion plus Diagnostics & Treatment Orders:     Migraines- controlled. Continue the Amitriptyline 25mg nightly. Can use the Fioricet for abortive tx prn. If headaches continue to be well controlled at next visit then acacia consider tapering down the Amitriptyline to 10mg nightly. She expressed full understanding.

## 2025-05-12 ENCOUNTER — HOSPITAL ENCOUNTER (OUTPATIENT)
Dept: MAMMOGRAPHY | Age: 65
Discharge: HOME | End: 2025-05-12
Attending: FAMILY MEDICINE
Payer: MEDICARE

## 2025-05-12 DIAGNOSIS — I10 BENIGN ESSENTIAL HYPERTENSION: ICD-10-CM

## 2025-05-12 DIAGNOSIS — Z12.31 ENCOUNTER FOR SCREENING MAMMOGRAM FOR MALIGNANT NEOPLASM OF BREAST: ICD-10-CM

## 2025-05-12 PROCEDURE — 77067 SCR MAMMO BI INCL CAD: CPT | Performed by: FAMILY MEDICINE

## 2025-05-12 PROCEDURE — 77063 BREAST TOMOSYNTHESIS BI: CPT | Performed by: FAMILY MEDICINE

## 2025-05-12 NOTE — TELEPHONE ENCOUNTER
Last office visit: 4/14/25  Next office visit: None stated  Last Refill:11/5/24      Please authorize if acceptable.   Thank you!

## 2025-05-13 RX ORDER — VALSARTAN AND HYDROCHLOROTHIAZIDE 160; 25 MG/1; MG/1
1 TABLET ORAL DAILY
Qty: 90 TABLET | Refills: 0 | Status: SHIPPED | OUTPATIENT
Start: 2025-05-13

## 2025-05-20 DIAGNOSIS — F43.20 GRIEF REACTION: ICD-10-CM

## 2025-05-20 NOTE — TELEPHONE ENCOUNTER
Rx refill  FLUoxetine 20 MG Oral Cap     Send to   [place] [id number] [place] [facility] - [place], [place] - [address] [facility] -[phone], [phone]     Patient states pharmacy has attempted to reach out for refill and has gotten no response.     Lov:4/14/25

## 2025-05-20 NOTE — TELEPHONE ENCOUNTER
LOV:  4/14/25  [place]    NOV:  None scheduled    Last refill:  Refilled by Sharon Rodriguez on 11/5/24   #90, 1 refill    Medication Detail  Medication Quantity Refills Start End   FLUoxetine 20 MG Oral Cap  90 capsule 1 11/5/2024 11/11/2024   Sig:   Take 1 capsule (20 mg total) by mouth daily.     Route:   Oral

## 2025-05-21 NOTE — TELEPHONE ENCOUNTER
Spoke with patient.  She is still taking the  fluoxetine 20 MG.      Please authorize if acceptable.   Thank you!

## 2025-06-30 ENCOUNTER — OFFICE VISIT (OUTPATIENT)
Dept: NEUROLOGY | Facility: CLINIC | Age: 65
End: 2025-06-30
Payer: MEDICARE

## 2025-06-30 VITALS
HEART RATE: 80 BPM | BODY MASS INDEX: 27.6 KG/M2 | OXYGEN SATURATION: 98 % | DIASTOLIC BLOOD PRESSURE: 76 MMHG | SYSTOLIC BLOOD PRESSURE: 102 MMHG | WEIGHT: 150 LBS | HEIGHT: 62 IN | RESPIRATION RATE: 16 BRPM

## 2025-06-30 DIAGNOSIS — G43.001 MIGRAINE WITHOUT AURA AND WITH STATUS MIGRAINOSUS, NOT INTRACTABLE: Primary | ICD-10-CM

## 2025-06-30 PROCEDURE — 99213 OFFICE O/P EST LOW 20 MIN: CPT | Performed by: PHYSICIAN ASSISTANT

## 2025-06-30 RX ORDER — OMEGA-3-ACID ETHYL ESTERS 1 G/1
4 CAPSULE, LIQUID FILLED ORAL DAILY
Qty: 360 CAPSULE | Refills: 0 | Status: SHIPPED | OUTPATIENT
Start: 2025-06-30

## 2025-06-30 RX ORDER — AMITRIPTYLINE HYDROCHLORIDE 10 MG/1
10 TABLET ORAL NIGHTLY
Qty: 30 TABLET | Refills: 2 | Status: SHIPPED | OUTPATIENT
Start: 2025-06-30

## 2025-06-30 NOTE — PATIENT INSTRUCTIONS
Refill policies:    Allow 2-3 business days for refills; controlled substances may take longer.  Contact your pharmacy at least 5 days prior to running out of medication and have them send an electronic request or submit request through the “request refill” option in your [employer] account.  Refills are not addressed on weekends; covering physicians do not authorize routine medications on weekends.  No narcotics or controlled substances are refilled after noon on Fridays or by on call physicians.  By law, narcotics must be electronically prescribed.  A 30 day supply with no refills is the maximum allowed.  If your prescription is due for a refill, you may be due for a follow up appointment.  To best provide you care, patients receiving routine medications need to be seen at least once a year.  Patients receiving narcotic/controlled substance medications need to be seen at least once every 3 months.  In the event that your preferred pharmacy does not have the requested medication in stock (e.g. Backordered), it is your responsibility to find another pharmacy that has the requested medication available.  We will gladly send a new prescription to that pharmacy at your request.    Scheduling Tests:    If your physician has ordered radiology tests such as MRI or CT scans, please contact Central Scheduling at [phone] right away to schedule the test.  Once scheduled, the [facility] Centralized Referral Team will work with your insurance carrier to obtain pre-certification or prior authorization.  Depending on your insurance carrier, approval may take 3-10 days.  It is highly recommended patients assure they have received an authorization before having a test performed.  If test is done without insurance authorization, patient may be responsible for the entire amount billed.      Precertification and Prior Authorizations:  If your physician has recommended that you have a procedure or additional testing performed the [facility]  Centralized Referral Team will contact your insurance carrier to obtain pre-certification or prior authorization.    You are strongly encouraged to contact your insurance carrier to verify that your procedure/test has been approved and is a COVERED benefit.  Although the [facility] Centralized Referral Team does its due diligence, the insurance carrier gives the disclaimer that \"Although the procedure is authorized, this does not guarantee payment.\"    Ultimately the patient is responsible for payment.   Thank you for your understanding in this matter.  Paperwork Completion:  If you require FMLA or disability paperwork for your recovery, please make sure to either drop it off or have it faxed to our office at [phone]. Be sure the form has your name and date of birth on it.  The form will be faxed to our Forms Department and they will complete it for you.  There is a 25$ fee for all forms that need to be filled out.  Please be aware there is a 10-14 day turnaround time.  You will need to sign a release of information (PIERO) form if your paperwork does not come with one.  You may call the Forms Department with any questions at [phone].  Their fax number is [phone].

## 2025-06-30 NOTE — PROGRESS NOTES
NEUROLOGY  [facility]       Previous visit and existing record notes reviewed in preparation for the face to face visit.  Relevant labs and studies reviewed and will be noted in relevant areas of this record.  Che Cabrera  3/16/1960  Primary Care Provider:  Nahun Mcdonald MD    6/30/2025  65 year old female    Last visit:  Patient was last seen 5/8/24 and at that time had not had a migraine in over a year      Accompanied Visit: No    Chief Complaint: Migraines    Present condition:  She has not had a migraine since she retired. She is no longer under stress. She is interested in trying to taper off the amitriptyline. She would use the fioricet as needed for any break through migraines  She is keeping busy with walking daily 2.5-4 miles and doing crafts  She finds crafting to be very calming for her  She has no new complaints today    Past History Reviewed & update/new problem(s): None    Review of Systems:  Review of Systems:  Denies systemic symptoms     No CP or SOB.  No GI or  symptoms. Relevant Neuro as noted above.    Past Medical History[1]    Medications:    Medications - Current[2]  PRN: PRN Medications[3]    Allergies:  Allergies[4]       EXAM:  /76 (BP Location: Left arm, Patient Position: Sitting, Cuff Size: adult)   Pulse 80   Resp 16   Ht 62\"   Wt 150 lb (68 kg)   SpO2 98%   BMI 27.44 kg/m²   Looks stated age  General Exam:  HENT:  pink conjunctiva anicteric sclerae  Neck no adenopathy, thyroid normal  Heart and Lungs:  normal  Extremities: no cyanosis, skin changes    NEURO  NAD, A&Ox3  EOMI  CN II-XII intact  Strength 5/5 all extremities  DTRs 2+ and symmetric  FTN intact bilaterally  No drift on exam  Normal gait  Tandem gait intact  Romberg negative    Problem/s Identified this visit:   1. Migraine without aura and with status migrainosus, not intractable      Discussion plus Diagnostics & Treatment Orders:    Migraines- No recurrence since she retired.  Will have her decrease the Amitriptyline to 10mg nightly x 1 month and if no recurrence then can discontinue it. Can continue the fioricet prn for abortive tx. She expressed full understanding.     (X) Discussed potential side effects of any treatment relevant to above.  Includes explanation of tests as necessary.    Return in about 1 year (around 6/30/2026).      Patient understands that if needed, based on condition and or test results, follow up will be readjusted    Sandra Harman PA-C  [facility]  6/30/2025, Time completed 11:24 AM             [1]   Past Medical History:   Arthritis    Decorative tattoo    Hx estrogen therapy    HRT    Hx of colposcopy with cervical biopsy    HYPERTHYROIDISM    Kidney stone    Thyroid disease    Unspecified essential hypertension   [2]   Current Outpatient Medications:     amitriptyline 10 MG Oral Tab, Take 1 tablet (10 mg total) by mouth nightly., Disp: 30 tablet, Rfl: 2    Valsartan-hydroCHLOROthiazide 160-25 MG Oral Tab, Take 1 tablet by mouth daily., Disp: 90 tablet, Rfl: 0    LEVOTHYROXINE 100 MCG Oral Tab, TAKE 1 TABLET BY MOUTH EVERY DAY, Disp: 90 tablet, Rfl: 0    FLUoxetine 20 MG Oral Cap, Take 1 capsule (20 mg total) by mouth daily., Disp: , Rfl:     omeprazole 20 MG Oral Capsule Delayed Release, Take 1 capsule (20 mg total) by mouth before breakfast., Disp: 90 capsule, Rfl: 0    atorvastatin 40 MG Oral Tab, Take 1 tablet (40 mg total) by mouth nightly., Disp: 90 tablet, Rfl: 0    butalbital-acetaminophen-caffeine -40 MG Oral Tab, TAKE 1- 2 TABLETS BY MOUTH EVERY 6 HOURS AS NEEDED FOR HEADACHE, LIMIT USE TO 3 TIMES A WEEK, Disp: 30 tablet, Rfl: 0    amLODIPine 10 MG Oral Tab, Take 1 tablet (10 mg total) by mouth daily., Disp: 90 tablet, Rfl: 1    Multiple Vitamin (MULTIVITAMIN OR), Take  by mouth., Disp: , Rfl:     Cholecalciferol (VITAMIN D) 2000 UNIT Oral Tab, Take by mouth. One 5,000 unit, one 2,000 unit daily , Disp: , Rfl:      OMEGA-3-ACID ETHYL ESTERS 1 g Oral Cap, TAKE 4 CAPSULES (4 G TOTAL) BY MOUTH DAILY., Disp: 360 capsule, Rfl: 0  [3] [4]   Allergies  Allergen Reactions    Milk Thistle      Powd     Peanuts      Derived Products

## 2025-06-30 NOTE — TELEPHONE ENCOUNTER
Last Office Visit: 4/14/25  Last Refill: 4/2/25  Return to Clinic: 6 months   Protocol: passed   NOV: n/a   Requested Prescriptions     Pending Prescriptions Disp Refills    OMEGA-3-ACID ETHYL ESTERS 1 g Oral Cap [Pharmacy Med Name: OMEGA-3 ETHYL ESTERS 1 GM CAP] 360 capsule 0     Sig: TAKE 4 CAPSULES (4 G TOTAL) BY MOUTH DAILY.         Please approve if appropriate.     Thank you!

## 2025-07-10 DIAGNOSIS — E78.5 HYPERLIPIDEMIA, UNSPECIFIED HYPERLIPIDEMIA TYPE: ICD-10-CM

## 2025-07-10 RX ORDER — ATORVASTATIN CALCIUM 40 MG/1
40 TABLET, FILM COATED ORAL NIGHTLY
Qty: 90 TABLET | Refills: 0 | Status: SHIPPED | OUTPATIENT
Start: 2025-07-10

## 2025-07-10 RX ORDER — OMEPRAZOLE 20 MG/1
20 CAPSULE, DELAYED RELEASE ORAL
Qty: 90 CAPSULE | Refills: 0 | Status: SHIPPED | OUTPATIENT
Start: 2025-07-10

## 2025-07-23 RX ORDER — LEVOTHYROXINE SODIUM 100 UG/1
100 TABLET ORAL DAILY
Qty: 90 TABLET | Refills: 0 | Status: SHIPPED | OUTPATIENT
Start: 2025-07-23

## 2025-07-23 NOTE — TELEPHONE ENCOUNTER
Requested Prescriptions     Pending Prescriptions Disp Refills    LEVOTHYROXINE 100 MCG Oral Tab [Pharmacy Med Name: LEVOTHYROXINE 100 MCG TABLET] 90 tablet 0     Sig: TAKE 1 TABLET BY MOUTH EVERY DAY     Last refill 4/24/25 #90  LOV 4/14/25  No future appointments.  Blood work due 10/14/25    Thyroid Medication Protocol [id number] 01:09 AM   Protocol Details TSH in past 12 months    Last TSH value is normal    In person appointment or virtual visit in the past 12 mos or appointment in next 3 mos    Medication is active on med list      Refilled per protocol

## 2025-07-31 DIAGNOSIS — I10 BENIGN ESSENTIAL HYPERTENSION: ICD-10-CM

## 2025-08-01 RX ORDER — AMLODIPINE BESYLATE 10 MG/1
10 TABLET ORAL DAILY
Qty: 90 TABLET | Refills: 0 | Status: SHIPPED | OUTPATIENT
Start: 2025-08-01

## 2025-08-08 DIAGNOSIS — I10 BENIGN ESSENTIAL HYPERTENSION: ICD-10-CM

## 2025-08-12 RX ORDER — VALSARTAN AND HYDROCHLOROTHIAZIDE 160; 25 MG/1; MG/1
1 TABLET ORAL DAILY
Qty: 90 TABLET | Refills: 0 | Status: SHIPPED | OUTPATIENT
Start: 2025-08-12

## 2025-08-15 DIAGNOSIS — F43.20 ADJUSTMENT DISORDER, UNSPECIFIED: ICD-10-CM

## (undated) NOTE — ED AVS SNAPSHOT
Flakitofranny Kelley   MRN: [id number]    Department:  [facility] Emergency Department   Date of Visit:  8/10/2018           Disclosure     Insurance plans vary and the physician(s) referred by the ER may not be covered by your plan.  Please contact yo tell this physician (or your personal doctor if your instructions are to return to your personal doctor) about any new or lasting problems. The primary care or specialist physician will see patients referred from the [facility] Emergency Department.  Andrés Jacome

## (undated) NOTE — LETTER
10/22/2018    [address] [id number]    Dear Ms. Wilfredo Bae,     [address] office has been trying to contact you to schedule a visit with your doctor to address important healthcare needs.   It is important that you con

## (undated) NOTE — MR AVS SNAPSHOT
[facility], [address] [id number] [phone]               Thank you for choosing us for your health care visit with Robert Leonardo PA-C.   We are glad to serve you and happy to provide you with this Levothyroxine Sodium 100 MCG Tabs   Take 1 tablet (100 mcg total) by mouth once daily. What changed:  See the new instructions. Commonly known as:  SYNTHROID           Losartan Potassium 50 MG Tabs   Take 1 tablet (50 mg total) by mouth once daily. Don’t eat while when you’re bored.      EAT THESE FOODS MORE OFTEN: EAT THESE FOODS LESS OFTEN:   Make half your plate fruits and vegetables Highly refined, white starches including white bread, rice and pasta   Eat plenty of protein, keep the fat content l

## (undated) NOTE — LETTER
02/12/20    Patient Name:  Kallie Rollins        To Whom it may concern:    Kallie Rollins was evaluated in the office today and should be excused from attending work from 2/12/20  through 2/13/20. She may return to work 2/14/20.       Sincerely,